# Patient Record
Sex: FEMALE | Race: WHITE | NOT HISPANIC OR LATINO | Employment: OTHER | URBAN - METROPOLITAN AREA
[De-identification: names, ages, dates, MRNs, and addresses within clinical notes are randomized per-mention and may not be internally consistent; named-entity substitution may affect disease eponyms.]

---

## 2017-01-11 ENCOUNTER — OFFICE VISIT (OUTPATIENT)
Dept: INTERNAL MEDICINE | Facility: CLINIC | Age: 82
End: 2017-01-11
Payer: MEDICARE

## 2017-01-11 VITALS
HEART RATE: 76 BPM | HEIGHT: 63 IN | SYSTOLIC BLOOD PRESSURE: 118 MMHG | DIASTOLIC BLOOD PRESSURE: 76 MMHG | BODY MASS INDEX: 30.16 KG/M2 | WEIGHT: 170.19 LBS

## 2017-01-11 DIAGNOSIS — F41.8 DEPRESSION WITH ANXIETY: Primary | Chronic | ICD-10-CM

## 2017-01-11 DIAGNOSIS — Z86.711 HISTORY OF PULMONARY EMBOLISM: Chronic | ICD-10-CM

## 2017-01-11 DIAGNOSIS — E78.00 HYPERCHOLESTEROLEMIA: ICD-10-CM

## 2017-01-11 DIAGNOSIS — I10 ESSENTIAL HYPERTENSION: Chronic | ICD-10-CM

## 2017-01-11 PROCEDURE — 1126F AMNT PAIN NOTED NONE PRSNT: CPT | Mod: S$GLB,,, | Performed by: INTERNAL MEDICINE

## 2017-01-11 PROCEDURE — 1159F MED LIST DOCD IN RCRD: CPT | Mod: S$GLB,,, | Performed by: INTERNAL MEDICINE

## 2017-01-11 PROCEDURE — 1157F ADVNC CARE PLAN IN RCRD: CPT | Mod: S$GLB,,, | Performed by: INTERNAL MEDICINE

## 2017-01-11 PROCEDURE — 3078F DIAST BP <80 MM HG: CPT | Mod: S$GLB,,, | Performed by: INTERNAL MEDICINE

## 2017-01-11 PROCEDURE — 99499 UNLISTED E&M SERVICE: CPT | Mod: S$GLB,,, | Performed by: INTERNAL MEDICINE

## 2017-01-11 PROCEDURE — 3074F SYST BP LT 130 MM HG: CPT | Mod: S$GLB,,, | Performed by: INTERNAL MEDICINE

## 2017-01-11 PROCEDURE — 99214 OFFICE O/P EST MOD 30 MIN: CPT | Mod: S$GLB,,, | Performed by: INTERNAL MEDICINE

## 2017-01-11 PROCEDURE — 1160F RVW MEDS BY RX/DR IN RCRD: CPT | Mod: S$GLB,,, | Performed by: INTERNAL MEDICINE

## 2017-01-11 RX ORDER — SERTRALINE HYDROCHLORIDE 50 MG/1
50 TABLET, FILM COATED ORAL DAILY
Qty: 30 TABLET | Refills: 11 | Status: SHIPPED | OUTPATIENT
Start: 2017-01-11 | End: 2017-03-02 | Stop reason: ALTCHOICE

## 2017-01-11 NOTE — PROGRESS NOTES
"Subjective:      Patient ID: Alessandra Hidalgo is a 81 y.o. female.    Chief Complaint: Follow-up    HPI:   "Can you give me something for energy?"   I have not had energy since I was in the hospital 2 years ago.  " I don't want to leave the house or do anything.  "I don't want to see anyone".  " I don't think the Lexapro is working".  She does all the driving,cleaning,cooking,marketing, etc...    Her  is in his mid eighties.  On chronic anticoagulation.  12/12/16 1312  High Final result      12/12/16 1312 BUN 16 - Final result     12/12/16 1312 CREATININE 1.13 - Final result     12/12/16 1312 CALCIUM 9.3 - Final result     12/12/16 1312  - Final result     12/12/16 1312 K 4.5 - Final result     12/12/16 1312 CL      PT 18.7    INR 2.8      Review of Systems   Constitutional: Negative.    HENT: Negative.    Eyes: Negative.    Respiratory: Positive for cough and shortness of breath. Negative for choking and chest tightness.    Cardiovascular: Negative for chest pain and leg swelling.   Gastrointestinal: Negative.    Endocrine: Negative.    Genitourinary: Negative.    Musculoskeletal: Negative.    Skin: Negative.    Neurological: Negative.    Hematological: Negative.    Psychiatric/Behavioral: Positive for dysphoric mood.       Objective:     Visit Vitals    /76 (BP Location: Right arm, Patient Position: Sitting, BP Method: Manual)    Pulse 76    Ht 5' 3" (1.6 m)    Wt 77.2 kg (170 lb 3.1 oz)    BMI 30.15 kg/m2       Physical Exam   Constitutional: She is oriented to person, place, and time. She appears well-developed and well-nourished.   HENT:   Head: Normocephalic and atraumatic.   Right Ear: External ear normal.   Left Ear: External ear normal.   Nose: Nose normal.   Mouth/Throat: Oropharynx is clear and moist.   Eyes: Conjunctivae are normal. Pupils are equal, round, and reactive to light.   Neck: Normal range of motion.   Cardiovascular: Normal rate, regular rhythm and normal heart " sounds.    Pulmonary/Chest: Effort normal and breath sounds normal.   Abdominal: Soft. Bowel sounds are normal.   Musculoskeletal: Normal range of motion.   Neurological: She is alert and oriented to person, place, and time.   Skin: Skin is warm and dry.   Psychiatric: She has a normal mood and affect. Her behavior is normal.   Vitals reviewed.      Assessment:     1. Depression with anxiety    2. Essential hypertension    3. History of pulmonary embolism    4. Hypercholesterolemia    Discussed possibility of metoprolol, but she was on this before the depression happened.  Plan:     Depression with anxiety  -     sertraline (ZOLOFT) 50 MG tablet; Take 1 tablet (50 mg total) by mouth once daily.  Dispense: 30 tablet; Refill: 11    Essential hypertension    History of pulmonary embolism  -     Protime-INR; Standing    Hypercholesterolemia

## 2017-01-11 NOTE — MR AVS SNAPSHOT
[facility]  [address],  [address]  [place] [address]  Phone: [phone]  Fax: [phone]                  Alessandra Hidalgo   2017 1:40 PM   Office Visit    Description:  Female : 1935   Provider:  Turner Adams MD   Department:  [facility]           Reason for Visit     Follow-up           Diagnoses this Visit        Comments    Essential hypertension    -  Primary     Depression with anxiety         History of pulmonary embolism         Hypercholesterolemia                To Do List           Goals (5 Years of Data)     None       These Medications        Disp Refills Start End    sertraline (ZOLOFT) 50 MG tablet 30 tablet 11 2017    Take 1 tablet (50 mg total) by mouth once daily. - Oral    Pharmacy: [facility] [place], [place] - [address] Ph #: [phone]         [facility] On Call     [facility] On Call Nurse Care Line -  Assistance  Registered nurses in the [facility] provide clinical advisement, health education, appointment booking, and other advisory services.  Call for this free service at [phone].             Medications           Message regarding Medications     Verify the changes and/or additions to your medication regime listed below are the same as discussed with your clinician today.  If any of these changes or additions are incorrect, please notify your healthcare provider.        START taking these NEW medications        Refills    sertraline (ZOLOFT) 50 MG tablet 11    Sig: Take 1 tablet (50 mg total) by mouth once daily.    Class: Normal    Route: Oral      STOP taking these medications     escitalopram oxalate (LEXAPRO) 10 MG tablet Take 1 tablet (10 mg total) by mouth once daily.           Verify that the below list of medications is an accurate representation of the medications you are currently taking.  If none reported, the list may be blank. If  "incorrect, please contact your healthcare provider. Carry this list with you in case of emergency.           Current Medications     alprazolam (XANAX) 0.5 MG tablet Take 1 tablet (0.5 mg total) by mouth 2 (two) times daily as needed.    metoprolol succinate (TOPROL-XL) 25 MG 24 hr tablet Take 1 tablet (25 mg total) by mouth once daily.    rosuvastatin (CRESTOR) 10 MG tablet TAKE 1 TABLET BY MOUTH ONCE DAILY FOR CHOLESTEROL.    warfarin (COUMADIN) 4 MG tablet TAKE ONE TABLET BY MOUTH DAILY.    fluticasone-salmeterol 250-50 mcg/dose (ADVAIR) 250-50 mcg/dose diskus inhaler Inhale 1 puff into the lungs 2 (two) times daily.    omeprazole (PRILOSEC) 20 MG capsule Take 1 capsule (20 mg total) by mouth once daily.    sertraline (ZOLOFT) 50 MG tablet Take 1 tablet (50 mg total) by mouth once daily.           Clinical Reference Information           Vital Signs - Last Recorded  Most recent update: 1/11/2017  1:43 PM by Clarissa Haney MA    BP Pulse Ht Wt BMI    118/76 (BP Location: Right arm, Patient Position: Sitting, BP Method: Manual) 76 5' 3" (1.6 m) 77.2 kg (170 lb 3.1 oz) 30.15 kg/m2      Blood Pressure          Most Recent Value    BP  118/76      Allergies as of 1/11/2017     Prozac [Fluoxetine]      Immunizations Administered on Date of Encounter - 1/11/2017     None      Orders Placed During Today's Visit     Recurring Lab Work Interval Expires    Protime-INR  Every 4 Weeks until 3/12/2018 3/12/2018      [facility] Sign-Up     Activating your [facility] account is as easy as 1-2-3!     1) Visit [email or web address], select Sign Up Now, enter this activation code and your date of birth, then select Next.  [email or web address]  Expires: 1/30/2017  3:02 PM      2) Create a username and password to use when you visit [facility] in the future and select a security question in case you lose your password and select Next.    3) Enter your e-mail address and click Sign Up!    Additional Information  If you have questions, please " e-mail [email or web address] or call [phone] to talk to our [facility] staff. Remember, [facility] is NOT to be used for urgent needs. For medical emergencies, dial 911.

## 2017-01-30 ENCOUNTER — TELEPHONE (OUTPATIENT)
Dept: INTERNAL MEDICINE | Facility: CLINIC | Age: 82
End: 2017-01-30

## 2017-02-01 DIAGNOSIS — Z86.711 HISTORY OF PULMONARY EMBOLISM: Chronic | ICD-10-CM

## 2017-02-01 NOTE — TELEPHONE ENCOUNTER
----- Message from Simin Saucedo sent at 2/1/2017  9:51 AM CST -----  Contact: patient  MEDICATION REFILLS    Name of medication:    Warfarin  Strength:                        4 mg  Direction:                        1 x day  30 or 90 day supply:       90      Pharmacy:                      [facility]  Patient phone #:  [phone]

## 2017-02-02 RX ORDER — WARFARIN 4 MG/1
TABLET ORAL
Qty: 90 TABLET | Refills: 3 | Status: SHIPPED | OUTPATIENT
Start: 2017-02-02 | End: 2018-01-29 | Stop reason: SDUPTHER

## 2017-02-07 ENCOUNTER — TELEPHONE (OUTPATIENT)
Dept: INTERNAL MEDICINE | Facility: CLINIC | Age: 82
End: 2017-02-07

## 2017-02-07 NOTE — TELEPHONE ENCOUNTER
Patient woke up this morning with nausea and diarrhea and stomach cramps. Patient is on coumadin and wants to know what she can take?   Pharm: [facility]    #[phone]

## 2017-02-07 NOTE — TELEPHONE ENCOUNTER
----- Message from Simin Saucedo sent at 2/7/2017  8:03 AM CST -----  Contact: patient  Patient had a appointment this morning but is canceling because she feels too sick to come in , having stomach cramps and diarrhea and would like a call back on what she can do. Call back [phone]

## 2017-03-01 ENCOUNTER — TELEPHONE (OUTPATIENT)
Dept: INTERNAL MEDICINE | Facility: CLINIC | Age: 82
End: 2017-03-01

## 2017-03-01 NOTE — TELEPHONE ENCOUNTER
Patient called the office to schedule an appointment with Dr. Adams. I explained to the patient that she doesn't have anything op today but I could put her with one of our other providers. Patient doesn't want to see anyone other than Dr. Adams and she will wait until tomorrow. I explained to patient if she starts to feel worse to go to the ER. Patient verbalized understanding.

## 2017-03-02 ENCOUNTER — OFFICE VISIT (OUTPATIENT)
Dept: INTERNAL MEDICINE | Facility: CLINIC | Age: 82
End: 2017-03-02
Payer: MEDICARE

## 2017-03-02 VITALS
RESPIRATION RATE: 16 BRPM | HEART RATE: 70 BPM | DIASTOLIC BLOOD PRESSURE: 70 MMHG | BODY MASS INDEX: 29.69 KG/M2 | OXYGEN SATURATION: 98 % | WEIGHT: 167.56 LBS | SYSTOLIC BLOOD PRESSURE: 128 MMHG | TEMPERATURE: 98 F | HEIGHT: 63 IN

## 2017-03-02 DIAGNOSIS — R05.3 COUGH, PERSISTENT: ICD-10-CM

## 2017-03-02 DIAGNOSIS — K21.9 GASTROESOPHAGEAL REFLUX DISEASE WITHOUT ESOPHAGITIS: Chronic | ICD-10-CM

## 2017-03-02 DIAGNOSIS — J30.89 NON-SEASONAL ALLERGIC RHINITIS DUE TO OTHER ALLERGIC TRIGGER: Primary | ICD-10-CM

## 2017-03-02 DIAGNOSIS — Z86.711 HISTORY OF PULMONARY EMBOLISM: Chronic | ICD-10-CM

## 2017-03-02 DIAGNOSIS — J43.9 PULMONARY EMPHYSEMA, UNSPECIFIED EMPHYSEMA TYPE: Chronic | ICD-10-CM

## 2017-03-02 PROCEDURE — 1157F ADVNC CARE PLAN IN RCRD: CPT | Mod: S$GLB,,, | Performed by: INTERNAL MEDICINE

## 2017-03-02 PROCEDURE — 3078F DIAST BP <80 MM HG: CPT | Mod: S$GLB,,, | Performed by: INTERNAL MEDICINE

## 2017-03-02 PROCEDURE — 99214 OFFICE O/P EST MOD 30 MIN: CPT | Mod: S$GLB,,, | Performed by: INTERNAL MEDICINE

## 2017-03-02 PROCEDURE — 1159F MED LIST DOCD IN RCRD: CPT | Mod: S$GLB,,, | Performed by: INTERNAL MEDICINE

## 2017-03-02 PROCEDURE — 1125F AMNT PAIN NOTED PAIN PRSNT: CPT | Mod: S$GLB,,, | Performed by: INTERNAL MEDICINE

## 2017-03-02 PROCEDURE — 3074F SYST BP LT 130 MM HG: CPT | Mod: S$GLB,,, | Performed by: INTERNAL MEDICINE

## 2017-03-02 PROCEDURE — 1160F RVW MEDS BY RX/DR IN RCRD: CPT | Mod: S$GLB,,, | Performed by: INTERNAL MEDICINE

## 2017-03-02 RX ORDER — CETIRIZINE HYDROCHLORIDE 10 MG/1
10 TABLET ORAL DAILY
Refills: 0 | COMMUNITY
Start: 2017-03-02 | End: 2017-10-16

## 2017-03-02 RX ORDER — ESOMEPRAZOLE MAGNESIUM 40 MG/1
40 CAPSULE, DELAYED RELEASE ORAL
Qty: 30 CAPSULE | Refills: 11 | Status: SHIPPED | OUTPATIENT
Start: 2017-03-02 | End: 2018-05-23

## 2017-03-02 NOTE — PROGRESS NOTES
"Subjective:      Patient ID: Alessandra Hidalgo is a 81 y.o. female.    Chief Complaint: Dizziness (pt c/o lightheadiness); Cough (yellow mucus for); Sore Throat; Nasal Congestion; and Fatigue    HPI: 81y/oWF has had nasal congestion,sore throat, cough productive of yellow  Mucous, fatigue and lightheadedness for "awhile". No fever,chills,GRAYSON,PND,orthopnea,CP,palpitations.  Does not think her Prilosec is doing any good. However, eats when and what she wants.    Review of Systems   All other systems reviewed and are negative.      Objective:   /70 (BP Location: Right arm, Patient Position: Sitting, BP Method: Manual)  Pulse 70  Temp 98.2 °F (36.8 °C) (Oral)   Resp 16  Ht 5' 3" (1.6 m)  Wt 76 kg (167 lb 8.8 oz)  SpO2 98%  BMI 29.68 kg/m2    Physical Exam   Constitutional: She is oriented to person, place, and time. She appears well-developed and well-nourished.   HENT:   Head: Normocephalic and atraumatic.   Mouth/Throat: Uvula is midline and mucous membranes are normal. Posterior oropharyngeal edema and posterior oropharyngeal erythema present. No oropharyngeal exudate.   Eyes: EOM are normal. Pupils are equal, round, and reactive to light.   Neck: Normal range of motion. Neck supple.   Cardiovascular: Normal rate, regular rhythm and normal heart sounds.    Pulmonary/Chest: Effort normal and breath sounds normal.   Abdominal: Soft. Bowel sounds are normal. There is no hepatosplenomegaly. There is no tenderness.   Musculoskeletal: Normal range of motion.   Neurological: She is alert and oriented to person, place, and time.   Skin: Skin is warm and dry.   Psychiatric: She has a normal mood and affect. Her behavior is normal.   Nursing note and vitals reviewed.      Assessment:     1. Non-seasonal allergic rhinitis due to other allergic trigger    2. Gastroesophageal reflux disease without esophagitis , these 2 dx seem to worsen her heartburn,  Causing worsening of her cough.   3. History of pulmonary embolism "    4. Cough, persistent    5. Pulmonary emphysema, unspecified emphysema type      Plan:     Non-seasonal allergic rhinitis due to other allergic trigger  -     cetirizine (ZYRTEC) 10 MG tablet; Take 1 tablet (10 mg total) by mouth once daily.; Refill: 0    Gastroesophageal reflux disease without esophagitis  -     esomeprazole (NEXIUM) 40 MG capsule; Take 1 capsule (40 mg total) by mouth before breakfast.  Dispense: 30 capsule; Refill: 11    History of pulmonary embolism    Cough, persistent    Pulmonary emphysema, unspecified emphysema type

## 2017-03-02 NOTE — MR AVS SNAPSHOT
[facility]  [address],  [address]  [place] [address]  Phone: [phone]  Fax: [phone]                  Alessandra Hidalgo   3/2/2017 1:40 PM   Office Visit    Description:  Female : 1935   Provider:  Turner Adams MD   Department:  [facility]           Reason for Visit     Dizziness     Cough     Sore Throat     Nasal Congestion     Fatigue           Diagnoses this Visit        Comments    Non-seasonal allergic rhinitis due to other allergic trigger    -  Primary     Gastroesophageal reflux disease without esophagitis         History of pulmonary embolism         Cough, persistent         Pulmonary emphysema, unspecified emphysema type                To Do List           Goals (5 Years of Data)     None       These Medications        Disp Refills Start End    esomeprazole (NEXIUM) 40 MG capsule 30 capsule 11 3/2/2017 2017    Take 1 capsule (40 mg total) by mouth before breakfast. - Oral    Pharmacy: [facility] [address] Ph #: [phone]         PURCHASE these Medications (No prescription required)        Start End    cetirizine (ZYRTEC) 10 MG tablet 3/2/2017 3/2/2018    Sig - Route: Take 1 tablet (10 mg total) by mouth once daily. - Oral    Class: OTC      [facility]     [facility] Care Line -  Assistance  Registered nurses in the [facility] provide clinical advisement, health education, appointment booking, and other advisory services.  Call for this free service at [phone].             Medications           Message regarding Medications     Verify the changes and/or additions to your medication regime listed below are the same as discussed with your clinician today.  If any of these changes or additions are incorrect, please notify your healthcare provider.        START taking these NEW medications        Refills    esomeprazole (NEXIUM) 40 MG capsule  11    Sig: Take 1 capsule (40 mg total) by mouth before breakfast.    Class: Normal    Route: Oral    cetirizine (ZYRTEC) 10 MG tablet 0    Sig: Take 1 tablet (10 mg total) by mouth once daily.    Class: OTC    Route: Oral      STOP taking these medications     fluticasone-salmeterol 250-50 mcg/dose (ADVAIR) 250-50 mcg/dose diskus inhaler Inhale 1 puff into the lungs 2 (two) times daily.    omeprazole (PRILOSEC) 20 MG capsule Take 1 capsule (20 mg total) by mouth once daily.    sertraline (ZOLOFT) 50 MG tablet Take 1 tablet (50 mg total) by mouth once daily.           Verify that the below list of medications is an accurate representation of the medications you are currently taking.  If none reported, the list may be blank. If incorrect, please contact your healthcare provider. Carry this list with you in case of emergency.           Current Medications     alprazolam (XANAX) 0.5 MG tablet Take 1 tablet (0.5 mg total) by mouth 2 (two) times daily as needed.    metoprolol succinate (TOPROL-XL) 25 MG 24 hr tablet Take 1 tablet (25 mg total) by mouth once daily.    rosuvastatin (CRESTOR) 10 MG tablet TAKE 1 TABLET BY MOUTH ONCE DAILY FOR CHOLESTEROL.    warfarin (COUMADIN) 4 MG tablet TAKE ONE TABLET BY MOUTH DAILY.    cetirizine (ZYRTEC) 10 MG tablet Take 1 tablet (10 mg total) by mouth once daily.    esomeprazole (NEXIUM) 40 MG capsule Take 1 capsule (40 mg total) by mouth before breakfast.           Clinical Reference Information           Your Vitals Were     BP                   128/70 (BP Location: Right arm, Patient Position: Sitting, BP Method: Manual)           Blood Pressure          Most Recent Value    BP  128/70      Allergies as of 3/2/2017     Prozac [Fluoxetine]      Immunizations Administered on Date of Encounter - 3/2/2017     None      [facility] Sign-Up     Activating your [facility] account is as easy as 1-2-3!     1) Visit [email or web address], select Sign Up Now, enter this activation code and your date  of birth, then select Next.  [email or web address]  Expires: 4/16/2017  2:09 PM      2) Create a username and password to use when you visit [facility] in the future and select a security question in case you lose your password and select Next.    3) Enter your e-mail address and click Sign Up!    Additional Information  If you have questions, please e-mail [email or web address] or call [phone] to talk to our [facility] staff. Remember, [facility] is NOT to be used for urgent needs. For medical emergencies, dial 911.         Language Assistance Services     ATTENTION: Language assistance services are available, free of charge. Please call [phone].      ATENCIÓN: Si habla español, tiene a raymundo disposición servicios gratuitos de asistencia lingüística. Llame al [phone].     CHÚ Ý: N?u b?n nói Ti?ng Vi?t, có các d?ch v? h? tr? ngôn ng? mi?n phí dành cho b?n. G?i s? [phone].         [facility] complies with applicable Federal civil rights laws and does not discriminate on the basis of race, color, national origin, age, disability, or sex.

## 2017-03-07 ENCOUNTER — OFFICE VISIT (OUTPATIENT)
Dept: INTERNAL MEDICINE | Facility: CLINIC | Age: 82
End: 2017-03-07
Payer: MEDICARE

## 2017-03-07 VITALS
HEIGHT: 63 IN | RESPIRATION RATE: 16 BRPM | SYSTOLIC BLOOD PRESSURE: 122 MMHG | BODY MASS INDEX: 29.72 KG/M2 | TEMPERATURE: 99 F | OXYGEN SATURATION: 97 % | HEART RATE: 78 BPM | WEIGHT: 167.75 LBS | DIASTOLIC BLOOD PRESSURE: 72 MMHG

## 2017-03-07 DIAGNOSIS — R05.9 COUGH: ICD-10-CM

## 2017-03-07 DIAGNOSIS — N39.0 URINARY TRACT INFECTION WITHOUT HEMATURIA, SITE UNSPECIFIED: Primary | ICD-10-CM

## 2017-03-07 PROCEDURE — 3074F SYST BP LT 130 MM HG: CPT | Mod: S$GLB,,, | Performed by: INTERNAL MEDICINE

## 2017-03-07 PROCEDURE — 99213 OFFICE O/P EST LOW 20 MIN: CPT | Mod: S$GLB,,, | Performed by: INTERNAL MEDICINE

## 2017-03-07 PROCEDURE — 1159F MED LIST DOCD IN RCRD: CPT | Mod: S$GLB,,, | Performed by: INTERNAL MEDICINE

## 2017-03-07 PROCEDURE — 1157F ADVNC CARE PLAN IN RCRD: CPT | Mod: S$GLB,,, | Performed by: INTERNAL MEDICINE

## 2017-03-07 PROCEDURE — 1125F AMNT PAIN NOTED PAIN PRSNT: CPT | Mod: S$GLB,,, | Performed by: INTERNAL MEDICINE

## 2017-03-07 PROCEDURE — 3078F DIAST BP <80 MM HG: CPT | Mod: S$GLB,,, | Performed by: INTERNAL MEDICINE

## 2017-03-07 PROCEDURE — 1160F RVW MEDS BY RX/DR IN RCRD: CPT | Mod: S$GLB,,, | Performed by: INTERNAL MEDICINE

## 2017-03-07 RX ORDER — SULFAMETHOXAZOLE AND TRIMETHOPRIM 800; 160 MG/1; MG/1
1 TABLET ORAL 2 TIMES DAILY
Qty: 6 TABLET | Refills: 0 | Status: SHIPPED | OUTPATIENT
Start: 2017-03-07 | End: 2017-03-10

## 2017-03-08 ENCOUNTER — TELEPHONE (OUTPATIENT)
Dept: INTERNAL MEDICINE | Facility: CLINIC | Age: 82
End: 2017-03-08

## 2017-03-08 DIAGNOSIS — Z86.711 HISTORY OF PULMONARY EMBOLISM: Primary | Chronic | ICD-10-CM

## 2017-03-10 ENCOUNTER — TELEPHONE (OUTPATIENT)
Dept: INTERNAL MEDICINE | Facility: CLINIC | Age: 82
End: 2017-03-10

## 2017-03-10 NOTE — TELEPHONE ENCOUNTER
----- Message from Simin Saucedo sent at 3/10/2017  1:33 PM CST -----  Contact: patient   Patient returning  call from today  and would also like the results of her blood work . Call back [phone]

## 2017-03-14 ENCOUNTER — TELEPHONE (OUTPATIENT)
Dept: INTERNAL MEDICINE | Facility: CLINIC | Age: 82
End: 2017-03-14

## 2017-03-16 ENCOUNTER — TELEPHONE (OUTPATIENT)
Dept: INTERNAL MEDICINE | Facility: CLINIC | Age: 82
End: 2017-03-16

## 2017-04-03 DIAGNOSIS — F41.9 ANXIETY: ICD-10-CM

## 2017-04-03 RX ORDER — ALPRAZOLAM 0.5 MG/1
0.5 TABLET ORAL 2 TIMES DAILY PRN
Qty: 60 TABLET | Refills: 1 | Status: SHIPPED | OUTPATIENT
Start: 2017-04-03 | End: 2017-08-01 | Stop reason: SDUPTHER

## 2017-04-05 ENCOUNTER — TELEPHONE (OUTPATIENT)
Dept: INTERNAL MEDICINE | Facility: CLINIC | Age: 82
End: 2017-04-05

## 2017-04-05 NOTE — TELEPHONE ENCOUNTER
----- Message from Simin Saucedo sent at 4/5/2017  3:25 PM CDT -----  Contact: Patient  Patient would like a new prescription of xanax filled at [facility].

## 2017-05-08 ENCOUNTER — TELEPHONE (OUTPATIENT)
Dept: INTERNAL MEDICINE | Facility: CLINIC | Age: 82
End: 2017-05-08

## 2017-05-08 NOTE — TELEPHONE ENCOUNTER
----- Message from Simin Saucedo sent at 5/8/2017  1:08 PM CDT -----  Contact: patient   Patient said she has requested lab results and not one has called her back. Please call [phone]

## 2017-06-06 ENCOUNTER — TELEPHONE (OUTPATIENT)
Dept: INTERNAL MEDICINE | Facility: CLINIC | Age: 82
End: 2017-06-06

## 2017-06-06 NOTE — TELEPHONE ENCOUNTER
Patient had her PT/INR done on 5/26/17 and she would like the results.  PT 27    INR 2.4   Ok.  TSA

## 2017-06-06 NOTE — TELEPHONE ENCOUNTER
----- Message from Simin Saucedo sent at 6/6/2017  9:32 AM CDT -----  Contact: patient  Patient would like her lab results on her PT INR she did last week . Call [phone]

## 2017-06-28 ENCOUNTER — OFFICE VISIT (OUTPATIENT)
Dept: INTERNAL MEDICINE | Facility: CLINIC | Age: 82
End: 2017-06-28
Payer: MEDICARE

## 2017-06-28 VITALS
OXYGEN SATURATION: 97 % | RESPIRATION RATE: 16 BRPM | DIASTOLIC BLOOD PRESSURE: 68 MMHG | HEART RATE: 84 BPM | SYSTOLIC BLOOD PRESSURE: 120 MMHG | HEIGHT: 63 IN | WEIGHT: 169 LBS | TEMPERATURE: 99 F | BODY MASS INDEX: 29.95 KG/M2

## 2017-06-28 DIAGNOSIS — J43.9 PULMONARY EMPHYSEMA, UNSPECIFIED EMPHYSEMA TYPE: Chronic | ICD-10-CM

## 2017-06-28 DIAGNOSIS — Z86.711 HISTORY OF PULMONARY EMBOLISM: Chronic | ICD-10-CM

## 2017-06-28 DIAGNOSIS — R42 DIZZINESS: Primary | ICD-10-CM

## 2017-06-28 DIAGNOSIS — J30.89 NON-SEASONAL ALLERGIC RHINITIS DUE TO OTHER ALLERGIC TRIGGER: ICD-10-CM

## 2017-06-28 PROCEDURE — 99499 UNLISTED E&M SERVICE: CPT | Mod: S$GLB,,, | Performed by: INTERNAL MEDICINE

## 2017-06-28 PROCEDURE — 99214 OFFICE O/P EST MOD 30 MIN: CPT | Mod: S$GLB,,, | Performed by: INTERNAL MEDICINE

## 2017-06-28 PROCEDURE — 1159F MED LIST DOCD IN RCRD: CPT | Mod: S$GLB,,, | Performed by: INTERNAL MEDICINE

## 2017-06-28 PROCEDURE — 1126F AMNT PAIN NOTED NONE PRSNT: CPT | Mod: S$GLB,,, | Performed by: INTERNAL MEDICINE

## 2017-06-28 RX ORDER — TRIAMCINOLONE ACETONIDE 55 UG/1
1 SPRAY, METERED NASAL DAILY
Qty: 16.9 G | Refills: 3 | Status: SHIPPED | OUTPATIENT
Start: 2017-06-28 | End: 2017-10-16

## 2017-06-28 RX ORDER — SERTRALINE HYDROCHLORIDE 50 MG/1
50 TABLET, FILM COATED ORAL DAILY
Refills: 11 | COMMUNITY
Start: 2017-05-01 | End: 2018-03-23 | Stop reason: SDUPTHER

## 2017-06-28 NOTE — PROGRESS NOTES
"Subjective:      Patient ID: Alessandra Hidalgo is a 81 y.o. female.    Chief Complaint: Results (lab results); Medication Refill; Sinusitis (runny nose); and Dizziness    HPI: 81y/oWF, has had a cough, runny nose, dizziness for months.  No known environmental causes.  No wheezing, bleeding, change in SOB,GRAYSON,CP,palpitations.  On therapy for allergies and GERD.  No change in medications.  PT ok for anticoagulation.      Review of Systems   HENT: Positive for hearing loss, postnasal drip and rhinorrhea. Negative for congestion, ear discharge, ear pain, facial swelling, nosebleeds, sinus pressure, sneezing, sore throat, tinnitus and trouble swallowing.    Neurological: Positive for dizziness. Negative for tremors, weakness, light-headedness and headaches.   All other systems reviewed and are negative.      Objective:   /68 (BP Location: Left arm, Patient Position: Sitting, BP Method: Manual)   Pulse 84   Temp 98.6 °F (37 °C) (Oral)   Resp 16   Ht 5' 3" (1.6 m)   Wt 76.6 kg (168 lb 15.7 oz)   SpO2 97%   BMI 29.93 kg/m²     Physical Exam   Constitutional: She is oriented to person, place, and time. She appears well-developed and well-nourished.   HENT:   Head: Normocephalic and atraumatic.   Right Ear: External ear normal.   Left Ear: External ear normal.   Nose: Nose normal.   Mouth/Throat: Oropharynx is clear and moist.   Eyes: EOM are normal. Pupils are equal, round, and reactive to light.   Neck: Normal range of motion. Neck supple. No JVD present.   Cardiovascular: Normal rate, regular rhythm and normal heart sounds.    Pulmonary/Chest: Effort normal and breath sounds normal. No stridor. She has no wheezes. She has no rales.   Abdominal: Soft. Bowel sounds are normal.   Musculoskeletal: Normal range of motion. She exhibits no edema or tenderness.   Neurological: She is alert and oriented to person, place, and time.   Skin: Skin is warm and dry.   Psychiatric: She has a normal mood and affect. Her behavior " is normal.   Nursing note and vitals reviewed.      Assessment:     1. Dizziness    2. Pulmonary emphysema, unspecified emphysema type    3. History of pulmonary embolism    4. Non-seasonal allergic rhinitis due to other allergic trigger      Plan:     Dizziness  -     Ambulatory referral to ENT    Pulmonary emphysema, unspecified emphysema type    History of pulmonary embolism    Non-seasonal allergic rhinitis due to other allergic trigger  -     Ambulatory referral to ENT  -     triamcinolone (NASACORT) 55 mcg nasal inhaler; 1 spray by Nasal route once daily at 6am.  Dispense: 16.9 g; Refill: 3

## 2017-08-01 DIAGNOSIS — F41.9 ANXIETY: ICD-10-CM

## 2017-08-01 NOTE — TELEPHONE ENCOUNTER
----- Message from Simin Saucedo sent at 8/1/2017  1:18 PM CDT -----  Contact: patient   Patient needs a new prescription for her xanax through [facility] and also the results on her labs. Call [phone]

## 2017-08-07 ENCOUNTER — TELEPHONE (OUTPATIENT)
Dept: INTERNAL MEDICINE | Facility: CLINIC | Age: 82
End: 2017-08-07

## 2017-08-07 NOTE — TELEPHONE ENCOUNTER
Pt requesting medication refill XANAX 0.5 mg tablet. Pharmacy [facility]. Vf/ma

## 2017-08-08 DIAGNOSIS — F41.9 ANXIETY: ICD-10-CM

## 2017-08-08 RX ORDER — ALPRAZOLAM 0.5 MG/1
TABLET ORAL
Qty: 60 TABLET | Refills: 1 | OUTPATIENT
Start: 2017-08-08

## 2017-08-08 RX ORDER — ALPRAZOLAM 0.5 MG/1
0.5 TABLET ORAL 2 TIMES DAILY PRN
Qty: 60 TABLET | Refills: 1 | Status: SHIPPED | OUTPATIENT
Start: 2017-08-08 | End: 2017-11-27 | Stop reason: SDUPTHER

## 2017-08-08 NOTE — TELEPHONE ENCOUNTER
----- Message from Simin Saucedo sent at 8/8/2017  9:12 AM CDT -----  Contact: Patient  Patient called last week for for a new prescription of xanax through [facility]. She has checked with them and no orders are in.

## 2017-08-17 DIAGNOSIS — F41.9 ANXIETY: ICD-10-CM

## 2017-08-17 RX ORDER — ALPRAZOLAM 0.5 MG/1
0.5 TABLET ORAL 2 TIMES DAILY PRN
Qty: 60 TABLET | Refills: 1 | OUTPATIENT
Start: 2017-08-17

## 2017-08-30 ENCOUNTER — TELEPHONE (OUTPATIENT)
Dept: ADMINISTRATIVE | Facility: HOSPITAL | Age: 82
End: 2017-08-30

## 2017-08-30 DIAGNOSIS — I10 ESSENTIAL HYPERTENSION: Chronic | ICD-10-CM

## 2017-08-30 DIAGNOSIS — Z79.01 ENCOUNTER FOR CURRENT LONG-TERM USE OF ANTICOAGULANTS: Primary | ICD-10-CM

## 2017-08-30 NOTE — TELEPHONE ENCOUNTER
Pt came by for results of her labs for her coumadin levels from last month, states she had her blood drawn again today.  Would like a call to get the results, also requests refill of Metoprolol to [facility], states usually filled by her cardiologist and not able to get in to see him. Please advise

## 2017-08-31 DIAGNOSIS — F41.9 ANXIETY: ICD-10-CM

## 2017-08-31 DIAGNOSIS — I10 ESSENTIAL HYPERTENSION: Chronic | ICD-10-CM

## 2017-08-31 RX ORDER — METOPROLOL SUCCINATE 25 MG/1
25 TABLET, EXTENDED RELEASE ORAL DAILY
Qty: 30 TABLET | Refills: 11 | Status: SHIPPED | OUTPATIENT
Start: 2017-08-31 | End: 2017-08-31 | Stop reason: SDUPTHER

## 2017-08-31 RX ORDER — METOPROLOL SUCCINATE 25 MG/1
25 TABLET, EXTENDED RELEASE ORAL DAILY
Qty: 30 TABLET | Refills: 0 | Status: SHIPPED | OUTPATIENT
Start: 2017-08-31 | End: 2018-08-31 | Stop reason: SDUPTHER

## 2017-08-31 NOTE — TELEPHONE ENCOUNTER
Spoke with patient and informed her to hold coumadin and repeat PT/INR on Saturday since Monday is a holiday. Patient verbalized understanding.

## 2017-08-31 NOTE — TELEPHONE ENCOUNTER
----- Message from [facility] CARMENZA Adams MD sent at 8/31/2017  3:52 PM CDT -----  Call pt and have her hold her coumadin tonight.  Needs PT done Monday am.  TSA

## 2017-09-01 ENCOUNTER — TELEPHONE (OUTPATIENT)
Dept: INTERNAL MEDICINE | Facility: CLINIC | Age: 82
End: 2017-09-01

## 2017-09-01 NOTE — TELEPHONE ENCOUNTER
----- Message from Opal Carpenter sent at 9/1/2017  8:53 AM CDT -----  Need to talk to nurse re:coumadin Rx.

## 2017-09-11 ENCOUNTER — TELEPHONE (OUTPATIENT)
Dept: INTERNAL MEDICINE | Facility: CLINIC | Age: 82
End: 2017-09-11

## 2017-09-11 NOTE — TELEPHONE ENCOUNTER
----- Message from Simin Saucedo sent at 9/11/2017 10:37 AM CDT -----  Contact: patient   Patient would like a call back with lab results from 09/02. Call [phone]

## 2017-09-12 NOTE — TELEPHONE ENCOUNTER
Spoke with patient and notified her to cont same dose of Coumadin and repeat in 1 month. Patient verbalized understanding

## 2017-09-26 ENCOUNTER — TELEPHONE (OUTPATIENT)
Dept: INTERNAL MEDICINE | Facility: CLINIC | Age: 82
End: 2017-09-26

## 2017-09-26 NOTE — TELEPHONE ENCOUNTER
----- Message from Simin Saucedo sent at 9/26/2017  1:45 PM CDT -----  Contact: PATIENT   Patient would like a call with lab results. Call [phone]

## 2017-09-29 ENCOUNTER — TELEPHONE (OUTPATIENT)
Dept: INTERNAL MEDICINE | Facility: CLINIC | Age: 82
End: 2017-09-29

## 2017-09-29 NOTE — TELEPHONE ENCOUNTER
Patient notified to cont same dose of Coumadin and repeat in 1 month. Patient verbalized understanding

## 2017-09-29 NOTE — TELEPHONE ENCOUNTER
----- Message from Jenna Callejas sent at 9/29/2017 10:56 AM CDT -----  Contact: Patient  Pt was requesting lab results. Pt can be reached @[phone]

## 2017-10-16 ENCOUNTER — OFFICE VISIT (OUTPATIENT)
Dept: INTERNAL MEDICINE | Facility: CLINIC | Age: 82
End: 2017-10-16
Payer: MEDICARE

## 2017-10-16 VITALS
BODY MASS INDEX: 30.45 KG/M2 | DIASTOLIC BLOOD PRESSURE: 72 MMHG | RESPIRATION RATE: 18 BRPM | SYSTOLIC BLOOD PRESSURE: 124 MMHG | WEIGHT: 171.88 LBS | HEIGHT: 63 IN | HEART RATE: 64 BPM

## 2017-10-16 DIAGNOSIS — J30.89 CHRONIC ALLERGIC RHINITIS DUE TO OTHER ALLERGIC TRIGGER, UNSPECIFIED SEASONALITY: Primary | ICD-10-CM

## 2017-10-16 DIAGNOSIS — K21.9 GASTROESOPHAGEAL REFLUX DISEASE WITHOUT ESOPHAGITIS: Chronic | ICD-10-CM

## 2017-10-16 DIAGNOSIS — Z86.711 HISTORY OF PULMONARY EMBOLISM: Chronic | ICD-10-CM

## 2017-10-16 DIAGNOSIS — J43.9 PULMONARY EMPHYSEMA, UNSPECIFIED EMPHYSEMA TYPE: Chronic | ICD-10-CM

## 2017-10-16 PROCEDURE — 99499 UNLISTED E&M SERVICE: CPT | Mod: S$GLB,,, | Performed by: INTERNAL MEDICINE

## 2017-10-16 PROCEDURE — 99214 OFFICE O/P EST MOD 30 MIN: CPT | Mod: S$GLB,,, | Performed by: INTERNAL MEDICINE

## 2017-10-16 RX ORDER — AZELASTINE 1 MG/ML
1 SPRAY, METERED NASAL 2 TIMES DAILY
Qty: 10 ML | Refills: 5 | Status: SHIPPED | OUTPATIENT
Start: 2017-10-16 | End: 2018-04-11

## 2017-10-16 NOTE — PROGRESS NOTES
"Subjective:      Patient ID: Alessandra Hidalgo is a 81 y.o. female.    Chief Complaint: Dizziness (follow up)    HPI: 81 y.o. White female  , went to see her Cardiologist, Dr. Archer at [facility]. He did an EKG,Echo, both of which are normal.  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 Now on coumadin.  Only complaints are post nasal drip, constant nose running, cough. No particular chest pain or heaviness, but fatigued all the  Time. Sleeps well at night.  She stopped the Zoloft, because she did not think it was effective.    Review of Systems   Constitutional: Positive for fatigue.   HENT: Positive for congestion, postnasal drip and sneezing.    Eyes: Negative.    Respiratory: Positive for cough.    Cardiovascular: Negative.    Gastrointestinal: Negative.    Genitourinary: Negative.    Musculoskeletal: Negative.    Skin: Negative.    Neurological: Positive for dizziness and light-headedness. Negative for weakness and headaches.   Hematological: Negative.    Psychiatric/Behavioral: Negative.        Objective:   /72 (BP Location: Left arm, Patient Position: Sitting, BP Method: Large (Manual))   Pulse 64   Resp 18   Ht 5' 3" (1.6 m)   Wt 78 kg (171 lb 13.6 oz)   BMI 30.44 kg/m²     Physical Exam   Constitutional: She is oriented to person, place, and time. She appears well-developed and well-nourished.   HENT:   Head: Normocephalic and atraumatic.   Right Ear: External ear and ear canal normal. Tympanic membrane is retracted.   Left Ear: External ear and ear canal normal. Tympanic membrane is retracted.   Nose: Nose normal.   Mouth/Throat: Uvula is midline, oropharynx is clear and moist and mucous membranes are normal.   Eyes: Conjunctivae are normal. Pupils are equal, round, and reactive to light.   Neck: Normal range of motion. Neck supple.   Cardiovascular: Normal rate, regular rhythm and normal heart sounds.    Pulmonary/Chest: Effort normal and breath sounds normal.   Abdominal: " Soft. Bowel sounds are normal.   Musculoskeletal: Normal range of motion.   Neurological: She is alert and oriented to person, place, and time.   Skin: Skin is warm and dry.   Psychiatric: She has a normal mood and affect. Her behavior is normal.   Nursing note and vitals reviewed.      Assessment:     1. Chronic allergic rhinitis due to other allergic trigger, unspecified seasonality    2. Pulmonary emphysema, unspecified emphysema type    3. History of pulmonary embolism    4. Gastroesophageal reflux disease without esophagitis      Plan:     Chronic allergic rhinitis due to other allergic trigger, unspecified seasonality  -     azelastine (ASTELIN) 137 mcg (0.1 %) nasal spray; 1 spray (137 mcg total) by Nasal route 2 (two) times daily.  Dispense: 10 mL; Refill: 5  -     chlorpheniramine (CHLOR-TRIMETON) 2 mg/5 mL syrup; 10ml po at bedtime.  Dispense: 473 mL; Refill: 5    Pulmonary emphysema, unspecified emphysema type    History of pulmonary embolism    Gastroesophageal reflux disease without esophagitis

## 2017-10-30 ENCOUNTER — TELEPHONE (OUTPATIENT)
Dept: INTERNAL MEDICINE | Facility: CLINIC | Age: 82
End: 2017-10-30

## 2017-10-30 NOTE — TELEPHONE ENCOUNTER
----- Message from Jenna Callejas sent at 10/30/2017  1:16 PM CDT -----  Pt is requesting lab results from last Monday. Pt can be reached at [phone]

## 2017-11-20 ENCOUNTER — TELEPHONE (OUTPATIENT)
Dept: INTERNAL MEDICINE | Facility: CLINIC | Age: 82
End: 2017-11-20

## 2017-11-27 DIAGNOSIS — F41.9 ANXIETY: ICD-10-CM

## 2017-11-27 RX ORDER — ALPRAZOLAM 0.5 MG/1
0.5 TABLET ORAL 2 TIMES DAILY PRN
Qty: 60 TABLET | Refills: 1 | Status: SHIPPED | OUTPATIENT
Start: 2017-11-27 | End: 2018-03-14 | Stop reason: SDUPTHER

## 2017-11-27 NOTE — TELEPHONE ENCOUNTER
----- Message from Simin Saucedo sent at 11/27/2017 12:56 PM CST -----  Contact: patient  Patient needs a prescription for xanax sent to [facility].

## 2017-12-21 ENCOUNTER — TELEPHONE (OUTPATIENT)
Dept: INTERNAL MEDICINE | Facility: CLINIC | Age: 82
End: 2017-12-21

## 2017-12-21 NOTE — TELEPHONE ENCOUNTER
Spoke with patient and informed her of PT/INR results and to stay on the same does of Coumadin. Patient verbalized understanding

## 2017-12-21 NOTE — TELEPHONE ENCOUNTER
----- Message from addison Adams MD sent at 12/21/2017  8:24 AM CST -----  plz call pt and tell her lab ok,same Rx.  TSA

## 2018-01-29 DIAGNOSIS — Z86.711 HISTORY OF PULMONARY EMBOLISM: Chronic | ICD-10-CM

## 2018-01-29 RX ORDER — WARFARIN 4 MG/1
TABLET ORAL
Qty: 90 TABLET | Refills: 3 | Status: SHIPPED | OUTPATIENT
Start: 2018-01-29 | End: 2018-06-27 | Stop reason: DRUGHIGH

## 2018-02-02 ENCOUNTER — TELEPHONE (OUTPATIENT)
Dept: INTERNAL MEDICINE | Facility: CLINIC | Age: 83
End: 2018-02-02

## 2018-02-02 NOTE — TELEPHONE ENCOUNTER
----- Message from Yana Boyer sent at 2/2/2018  3:16 PM CST -----  Contact: [phone]/self  Patient would like to get test results from 1/25/18. Please advise.

## 2018-02-27 DIAGNOSIS — F41.9 ANXIETY: ICD-10-CM

## 2018-02-27 RX ORDER — ALPRAZOLAM 0.5 MG/1
TABLET ORAL
Qty: 60 TABLET | Refills: 1 | OUTPATIENT
Start: 2018-02-27

## 2018-02-28 ENCOUNTER — TELEPHONE (OUTPATIENT)
Dept: INTERNAL MEDICINE | Facility: CLINIC | Age: 83
End: 2018-02-28

## 2018-02-28 NOTE — TELEPHONE ENCOUNTER
Spoke with patient and informed her of results. Patient is on coumadin 4mg daily. Patient verbalized understanding

## 2018-02-28 NOTE — TELEPHONE ENCOUNTER
----- [facility] from [facility] sent at 2/28/2018  1:22 PM CST -----  plz let her know her PT is good, same dose.  TSA

## 2018-03-06 ENCOUNTER — PES CALL (OUTPATIENT)
Dept: ADMINISTRATIVE | Facility: CLINIC | Age: 83
End: 2018-03-06

## 2018-03-12 ENCOUNTER — TELEPHONE (OUTPATIENT)
Dept: INTERNAL MEDICINE | Facility: CLINIC | Age: 83
End: 2018-03-12

## 2018-03-12 DIAGNOSIS — F41.9 ANXIETY: ICD-10-CM

## 2018-03-12 RX ORDER — ALPRAZOLAM 0.5 MG/1
0.5 TABLET ORAL 2 TIMES DAILY PRN
Qty: 60 TABLET | Refills: 1 | OUTPATIENT
Start: 2018-03-12

## 2018-03-12 NOTE — TELEPHONE ENCOUNTER
----- Message from Simin Saucedo sent at 3/12/2018  3:15 PM CDT -----  Contact: Son/Omid  Son came by to  patients prescription for ALPRAZolam (XANAX) 0.5 MG tablet. I told him it was not ready, please give a call when it is ready to be picked up. Call [phone]

## 2018-03-12 NOTE — TELEPHONE ENCOUNTER
Patient states that she had an appointment scheduled for today and she was not feeling well. She will call the office to schedule an appt when she is feeling better.

## 2018-03-12 NOTE — TELEPHONE ENCOUNTER
----- Message from Bismark Forman sent at 3/12/2018 11:21 AM CDT -----  Contact: self/[phone]  Patient is requesting a refill on her medications.  Please call and advise when sent to pharmacy.    ALPRAZolam (XANAX) 0.5 MG tablet    [facility] - [place], [place] - [address], [place]  Pt will pick Xanax up from office when ready.

## 2018-03-14 ENCOUNTER — OFFICE VISIT (OUTPATIENT)
Dept: INTERNAL MEDICINE | Facility: CLINIC | Age: 83
End: 2018-03-14
Payer: MEDICARE

## 2018-03-14 VITALS
WEIGHT: 171.81 LBS | DIASTOLIC BLOOD PRESSURE: 76 MMHG | HEIGHT: 63 IN | BODY MASS INDEX: 30.44 KG/M2 | SYSTOLIC BLOOD PRESSURE: 128 MMHG | HEART RATE: 73 BPM | OXYGEN SATURATION: 98 %

## 2018-03-14 DIAGNOSIS — J43.9 PULMONARY EMPHYSEMA, UNSPECIFIED EMPHYSEMA TYPE: Primary | Chronic | ICD-10-CM

## 2018-03-14 DIAGNOSIS — E78.00 HYPERCHOLESTEROLEMIA: ICD-10-CM

## 2018-03-14 DIAGNOSIS — F41.9 ANXIETY: ICD-10-CM

## 2018-03-14 DIAGNOSIS — F41.8 DEPRESSION WITH ANXIETY: Chronic | ICD-10-CM

## 2018-03-14 DIAGNOSIS — I10 ESSENTIAL HYPERTENSION: Chronic | ICD-10-CM

## 2018-03-14 DIAGNOSIS — H93.19 TINNITUS, UNSPECIFIED LATERALITY: ICD-10-CM

## 2018-03-14 PROCEDURE — 99499 UNLISTED E&M SERVICE: CPT | Mod: S$GLB,,, | Performed by: INTERNAL MEDICINE

## 2018-03-14 PROCEDURE — 3078F DIAST BP <80 MM HG: CPT | Mod: CPTII,S$GLB,, | Performed by: INTERNAL MEDICINE

## 2018-03-14 PROCEDURE — 3074F SYST BP LT 130 MM HG: CPT | Mod: CPTII,S$GLB,, | Performed by: INTERNAL MEDICINE

## 2018-03-14 PROCEDURE — 99999 PR PBB SHADOW E&M-EST. PATIENT-LVL IV: CPT | Mod: PBBFAC,,, | Performed by: INTERNAL MEDICINE

## 2018-03-14 PROCEDURE — 99214 OFFICE O/P EST MOD 30 MIN: CPT | Mod: S$GLB,,, | Performed by: INTERNAL MEDICINE

## 2018-03-14 RX ORDER — ALPRAZOLAM 0.5 MG/1
0.5 TABLET ORAL 2 TIMES DAILY PRN
Qty: 60 TABLET | Refills: 1 | Status: SHIPPED | OUTPATIENT
Start: 2018-03-14 | End: 2018-06-20 | Stop reason: SDUPTHER

## 2018-03-14 NOTE — PROGRESS NOTES
"Subjective:      Patient ID: Alessandra Hidalgo is a 82 y.o. female.    Chief Complaint: Dizziness; Sinus Problem; and Cough    HPI: 82 y.o. White female , has had a complicated history:  Sept 2015 had a TKR, PE,coded,intubated,resuscitated....  Long history of fatigue.  Lightheadedness on & off for years. Off & on during the day, without any specification of whether  Position, time of day,meds,foods,weather make a difference.  Tinnitus all day long. Nothing makes it worse, however, she can sleep at night well.  Daily morning headache, then goes away, once up. Takes nothing for this. Only thing new  Is a pillow, given to her for [place].      Review of Systems   Constitutional: Negative.    HENT: Positive for congestion, postnasal drip and tinnitus. Negative for ear discharge, ear pain, facial swelling, mouth sores, nosebleeds, rhinorrhea, sinus pain, sinus pressure, sneezing, sore throat, trouble swallowing and voice change.    Eyes: Negative.    Respiratory: Positive for cough. Negative for choking, chest tightness, shortness of breath and wheezing.    Cardiovascular: Negative for chest pain and palpitations.   Gastrointestinal: Negative.    Endocrine: Negative.    Genitourinary: Negative.    Musculoskeletal: Negative.    Neurological: Positive for light-headedness and headaches. Negative for dizziness, tremors, seizures, syncope, facial asymmetry, speech difficulty, weakness and numbness.   Hematological: Negative.    Psychiatric/Behavioral: Negative.        Objective:   /76   Pulse 73   Ht 5' 3" (1.6 m)   Wt 77.9 kg (171 lb 12.8 oz)   SpO2 98%   BMI 30.43 kg/m²   No orthostatic changes.  Physical Exam   Constitutional: She appears well-developed and well-nourished.   HENT:   Head: Normocephalic and atraumatic.   Right Ear: Tympanic membrane, external ear and ear canal normal.   Left Ear: Tympanic membrane, external ear and ear canal normal.   Nose: Nose normal.   Mouth/Throat: Uvula is midline, " oropharynx is clear and moist and mucous membranes are normal.   Eyes: Conjunctivae and EOM are normal. Pupils are equal, round, and reactive to light.   Neck: Neck supple. No JVD present. Carotid bruit is not present. No thyroid mass present.   Cardiovascular: Normal rate, regular rhythm and normal heart sounds.    Pulmonary/Chest: Effort normal and breath sounds normal. No stridor.   Abdominal: Soft. Bowel sounds are normal.   Musculoskeletal: Normal range of motion. She exhibits no edema.   Neurological: She is alert.   Skin: Skin is warm and dry.   Psychiatric: She has a normal mood and affect. Her behavior is normal.   Nursing note and vitals reviewed.      Assessment:     1. Pulmonary emphysema, unspecified emphysema type    2. Tinnitus, unspecified laterality    3. Essential hypertension    4. Hypercholesterolemia    5. Anxiety    6. Depression with anxiety    She has seen Dr. Barone in the past, who has ordered imaging of her head, but she did not get it done.  Plan:     Pulmonary emphysema, unspecified emphysema type  -     X-Ray Chest PA And Lateral; Future; Expected date: 03/14/2018    Tinnitus, unspecified laterality  She Dr. Barone to r/o vascular vs conductive tinnitus.Essential hypertension  -     CBC auto differential; Future; Expected date: 03/14/2018  -     Comprehensive metabolic panel; Future; Expected date: 03/14/2018  -     TSH; Future; Expected date: 03/14/2018  -     Urinalysis; Future; Expected date: 03/14/2018    Hypercholesterolemia  -     Lipid panel; Future; Expected date: 03/14/2018    Anxiety  -     TSH; Future; Expected date: 03/14/2018  -     ALPRAZolam (XANAX) 0.5 MG tablet; Take 1 tablet (0.5 mg total) by mouth 2 (two) times daily as needed.  Dispense: 60 tablet; Refill: 1    Depression with anxiety

## 2018-03-20 DIAGNOSIS — J32.4 CHRONIC PANSINUSITIS: Primary | ICD-10-CM

## 2018-03-20 DIAGNOSIS — R09.82 POST-NASAL DRIP: ICD-10-CM

## 2018-03-26 RX ORDER — SERTRALINE HYDROCHLORIDE 50 MG/1
TABLET, FILM COATED ORAL
Qty: 30 TABLET | Refills: 11 | Status: SHIPPED | OUTPATIENT
Start: 2018-03-26 | End: 2018-08-27 | Stop reason: SDUPTHER

## 2018-04-02 DIAGNOSIS — Z79.01 ANTICOAGULANT LONG-TERM USE: Primary | ICD-10-CM

## 2018-04-11 ENCOUNTER — OFFICE VISIT (OUTPATIENT)
Dept: INTERNAL MEDICINE | Facility: CLINIC | Age: 83
End: 2018-04-11
Payer: MEDICARE

## 2018-04-11 VITALS
HEART RATE: 68 BPM | SYSTOLIC BLOOD PRESSURE: 120 MMHG | WEIGHT: 173.31 LBS | OXYGEN SATURATION: 97 % | HEIGHT: 63 IN | TEMPERATURE: 98 F | RESPIRATION RATE: 16 BRPM | BODY MASS INDEX: 30.71 KG/M2 | DIASTOLIC BLOOD PRESSURE: 66 MMHG

## 2018-04-11 DIAGNOSIS — N18.30 CKD (CHRONIC KIDNEY DISEASE) STAGE 3, GFR 30-59 ML/MIN: ICD-10-CM

## 2018-04-11 DIAGNOSIS — J30.89 NON-SEASONAL ALLERGIC RHINITIS DUE TO OTHER ALLERGIC TRIGGER, UNSPECIFIED CHRONICITY: Primary | ICD-10-CM

## 2018-04-11 DIAGNOSIS — G89.4 CHRONIC PAIN SYNDROME: ICD-10-CM

## 2018-04-11 DIAGNOSIS — F41.8 DEPRESSION WITH ANXIETY: Chronic | ICD-10-CM

## 2018-04-11 PROCEDURE — 99213 OFFICE O/P EST LOW 20 MIN: CPT | Mod: S$GLB,,, | Performed by: INTERNAL MEDICINE

## 2018-04-11 PROCEDURE — 99499 UNLISTED E&M SERVICE: CPT | Mod: S$GLB,,, | Performed by: INTERNAL MEDICINE

## 2018-04-11 PROCEDURE — 99999 PR PBB SHADOW E&M-EST. PATIENT-LVL III: CPT | Mod: PBBFAC,,, | Performed by: INTERNAL MEDICINE

## 2018-04-11 PROCEDURE — 3074F SYST BP LT 130 MM HG: CPT | Mod: CPTII,S$GLB,, | Performed by: INTERNAL MEDICINE

## 2018-04-11 PROCEDURE — 3078F DIAST BP <80 MM HG: CPT | Mod: CPTII,S$GLB,, | Performed by: INTERNAL MEDICINE

## 2018-04-11 RX ORDER — DULOXETIN HYDROCHLORIDE 20 MG/1
20 CAPSULE, DELAYED RELEASE ORAL DAILY
Qty: 30 CAPSULE | Refills: 11 | Status: SHIPPED | OUTPATIENT
Start: 2018-04-11 | End: 2018-08-31 | Stop reason: SDUPTHER

## 2018-04-11 NOTE — PROGRESS NOTES
"Subjective:      Patient ID: Alessandra Hidalgo is a 82 y.o. female.    Chief Complaint: Follow-up (1 month follow up); Cough (runny nose x); Headache (x 3-4 month); Fatigue (x 3 month); and Sore Throat (x 1 month)    HPI: 82 y.o. White female , has seen Dr. Barone for chronic rhinitis. He did a CT of sinuses, which was unrevealing.  Issues:  -tired, fatigued all day long ( sleeps well, awakens refreshed).  -headache, runny nose, sore throat and cough for 1 month.  -when asked, pt is very depressed, does nothing all day.  -ch pain in lower back and both knees.       Review of Systems   Constitutional: Positive for fatigue. Negative for chills, diaphoresis and fever.   HENT: Positive for congestion, postnasal drip and rhinorrhea.    Eyes: Negative.    Respiratory: Positive for cough and shortness of breath. Negative for chest tightness.    Cardiovascular: Negative for chest pain and palpitations.   Gastrointestinal: Negative.    Endocrine: Negative.    Genitourinary: Positive for frequency and urgency.        Dribbles   Musculoskeletal: Positive for arthralgias, back pain and gait problem.   Neurological: Positive for dizziness and headaches. Negative for tremors, speech difficulty, weakness and numbness.   Hematological: Negative.    Psychiatric/Behavioral: Positive for dysphoric mood. Negative for sleep disturbance.       Objective:   /66 (BP Location: Left arm, Patient Position: Sitting, BP Method: Large (Manual))   Pulse 68   Temp 98.2 °F (36.8 °C) (Oral)   Resp 16   Ht 5' 3" (1.6 m)   Wt 78.6 kg (173 lb 4.5 oz)   SpO2 97%   BMI 30.70 kg/m²     Physical Exam   Constitutional: She is oriented to person, place, and time. She appears well-developed and well-nourished.   HENT:   Head: Normocephalic and atraumatic.   Right Ear: External ear normal.   Left Ear: External ear normal.   Nose: Nose normal.   Mouth/Throat: Oropharynx is clear and moist.   Eyes: Conjunctivae are normal.   Neck: Normal range of " motion.   Cardiovascular: Normal rate, regular rhythm and normal heart sounds.    Pulmonary/Chest: Effort normal and breath sounds normal. No stridor.   Lymphadenopathy:     She has no cervical adenopathy.   Neurological: She is alert and oriented to person, place, and time.   Skin: Skin is warm and dry.   Psychiatric: She has a normal mood and affect. Her behavior is normal.   Nursing note and vitals reviewed.      Assessment:     1. Non-seasonal allergic rhinitis due to other allergic trigger, unspecified chronicity    2. CKD (chronic kidney disease) stage 3, GFR 30-59 ml/min    3. Depression with anxiety    4. Chronic pain syndrome      Plan:     Non-seasonal allergic rhinitis due to other allergic trigger, unspecified chronicity  She has nasal sprays at home.  CKD (chronic kidney disease) stage 3, GFR 30-59 ml/min    Depression with anxiety  -     DULoxetine (CYMBALTA) 20 MG capsule; Take 1 capsule (20 mg total) by mouth once daily.  Dispense: 30 capsule; Refill: 11    Chronic pain syndrome  -     DULoxetine (CYMBALTA) 20 MG capsule; Take 1 capsule (20 mg total) by mouth once daily.  Dispense: 30 capsule; Refill: 11

## 2018-04-12 ENCOUNTER — TELEPHONE (OUTPATIENT)
Dept: INTERNAL MEDICINE | Facility: CLINIC | Age: 83
End: 2018-04-12

## 2018-04-12 NOTE — TELEPHONE ENCOUNTER
----- Message from Carolyn Hernandez sent at 4/12/2018  8:45 AM CDT -----  Contact: [phone]/self  Patient would like to know if  medication DULoxetine (CYMBALTA) 20 MG capsule and sertraline (ZOLOFT) 50 MG tablet have a drug interaction. Please advise.

## 2018-05-23 ENCOUNTER — OFFICE VISIT (OUTPATIENT)
Dept: INTERNAL MEDICINE | Facility: CLINIC | Age: 83
End: 2018-05-23
Payer: MEDICARE

## 2018-05-23 ENCOUNTER — TELEPHONE (OUTPATIENT)
Dept: INTERNAL MEDICINE | Facility: CLINIC | Age: 83
End: 2018-05-23

## 2018-05-23 VITALS
HEIGHT: 63 IN | WEIGHT: 172.5 LBS | HEART RATE: 76 BPM | DIASTOLIC BLOOD PRESSURE: 60 MMHG | BODY MASS INDEX: 30.56 KG/M2 | SYSTOLIC BLOOD PRESSURE: 112 MMHG | OXYGEN SATURATION: 96 %

## 2018-05-23 DIAGNOSIS — M19.90 ARTHRITIS: ICD-10-CM

## 2018-05-23 DIAGNOSIS — Z86.711 HISTORY OF PULMONARY EMBOLISM: Chronic | ICD-10-CM

## 2018-05-23 DIAGNOSIS — J43.9 PULMONARY EMPHYSEMA, UNSPECIFIED EMPHYSEMA TYPE: Primary | Chronic | ICD-10-CM

## 2018-05-23 DIAGNOSIS — R05.9 COUGH: ICD-10-CM

## 2018-05-23 DIAGNOSIS — I10 ESSENTIAL HYPERTENSION: Chronic | ICD-10-CM

## 2018-05-23 PROCEDURE — 3074F SYST BP LT 130 MM HG: CPT | Mod: CPTII,S$GLB,, | Performed by: INTERNAL MEDICINE

## 2018-05-23 PROCEDURE — 99999 PR PBB SHADOW E&M-EST. PATIENT-LVL III: CPT | Mod: PBBFAC,,, | Performed by: INTERNAL MEDICINE

## 2018-05-23 PROCEDURE — 99214 OFFICE O/P EST MOD 30 MIN: CPT | Mod: S$GLB,,, | Performed by: INTERNAL MEDICINE

## 2018-05-23 PROCEDURE — 3078F DIAST BP <80 MM HG: CPT | Mod: CPTII,S$GLB,, | Performed by: INTERNAL MEDICINE

## 2018-05-23 PROCEDURE — 99499 UNLISTED E&M SERVICE: CPT | Mod: S$GLB,,, | Performed by: INTERNAL MEDICINE

## 2018-05-23 RX ORDER — TRAMADOL HYDROCHLORIDE 50 MG/1
50 TABLET ORAL
Qty: 30 TABLET | Refills: 1 | Status: SHIPPED | OUTPATIENT
Start: 2018-05-23 | End: 2018-06-02

## 2018-05-23 RX ORDER — BUDESONIDE AND FORMOTEROL FUMARATE DIHYDRATE 160; 4.5 UG/1; UG/1
1 AEROSOL RESPIRATORY (INHALATION) EVERY 12 HOURS
Qty: 1 INHALER | Refills: 12 | Status: SHIPPED | OUTPATIENT
Start: 2018-05-23 | End: 2018-08-31 | Stop reason: SDUPTHER

## 2018-05-23 NOTE — PROGRESS NOTES
"Subjective:      Patient ID: Alessandra Hidalgo is a 82 y.o. female.    Chief Complaint: Follow-up and Fatigue    HPI: 82 y.o. White female, recently seen for fatigue.  Pt has a mild degree of SOB since here knee surgery.  She also, still has pain in both knees and low back, and because  Of her coumadin is restricted to what she can take.  Is somewhat depressed.  All labs reviewed.    No anemia.  PT therapeutic.      Review of Systems   Constitutional: Positive for fatigue. Negative for chills, diaphoresis and fever.   HENT: Negative.    Eyes: Negative.    Respiratory: Positive for shortness of breath. Negative for cough, choking, chest tightness and wheezing.    Cardiovascular: Negative for chest pain and palpitations.   Gastrointestinal: Negative.    Endocrine: Negative.    Genitourinary: Negative.    Musculoskeletal: Positive for back pain and gait problem.   Allergic/Immunologic: Negative.    Neurological: Positive for light-headedness. Negative for dizziness, weakness and headaches.        Minor lightheadedness when she awakens.   Hematological: Negative.    Psychiatric/Behavioral: Positive for dysphoric mood.       Objective:   /60   Pulse 76   Ht 5' 3" (1.6 m)   Wt 78.2 kg (172 lb 8 oz)   SpO2 96%   BMI 30.56 kg/m²     Physical Exam   Constitutional: She appears well-developed and well-nourished.   HENT:   Head: Normocephalic and atraumatic.   Nose: Nose normal.   Mouth/Throat: Oropharynx is clear and moist.   Eyes: Conjunctivae are normal.   Neck: Normal range of motion. Neck supple. No JVD present.   Cardiovascular: Normal rate, regular rhythm and normal heart sounds.    Pulmonary/Chest: Effort normal and breath sounds normal.   Abdominal: Soft. Bowel sounds are normal.   Musculoskeletal: Normal range of motion.   Neurological: She is alert.   Skin: Skin is warm and dry.   Psychiatric: She has a normal mood and affect. Her behavior is normal.   Nursing note and vitals reviewed.      Assessment: "     1. Pulmonary emphysema, unspecified emphysema type    2. History of pulmonary embolism    3. Essential hypertension    4. Cough    5. Arthritis    The chronic arthritic pain can make peolple fatigued.  Also, her emphysema and SOB could be a strain, so will begin Symbicort and Tramadol.  Plan:     Pulmonary emphysema, unspecified emphysema type  -     budesonide-formoterol 160-4.5 mcg (SYMBICORT) 160-4.5 mcg/actuation HFAA; Inhale 1 puff into the lungs every 12 (twelve) hours.  Dispense: 1 Inhaler; Refill: 12    History of pulmonary embolism    Essential hypertension    Cough    Arthritis  -     traMADol (ULTRAM) 50 mg tablet; Take 1 tablet (50 mg total) by mouth every 24 hours as needed for Pain.  Dispense: 30 tablet; Refill: 1

## 2018-05-23 NOTE — TELEPHONE ENCOUNTER
----- Message from [facility] CARMENZA Adams MD sent at 5/22/2018  4:48 PM CDT -----  PLz call pt and tell her the bleeding time is good. Stay on same dose of coumadin.  TSA

## 2018-06-02 NOTE — TELEPHONE ENCOUNTER
----- Message from [facility] CARMENZA Adams MD sent at 11/20/2017  3:54 PM CST -----  Plz call pt, and tell her, her labs look good, stay on same amount of coumadin.  TSA  
Patient notified of information below.   
No

## 2018-06-20 DIAGNOSIS — F41.9 ANXIETY: ICD-10-CM

## 2018-06-25 DIAGNOSIS — Z79.01 LONG TERM (CURRENT) USE OF ANTICOAGULANTS: Primary | ICD-10-CM

## 2018-06-27 ENCOUNTER — TELEPHONE (OUTPATIENT)
Dept: FAMILY MEDICINE | Facility: CLINIC | Age: 83
End: 2018-06-27

## 2018-06-27 ENCOUNTER — TELEPHONE (OUTPATIENT)
Dept: INTERNAL MEDICINE | Facility: CLINIC | Age: 83
End: 2018-06-27

## 2018-06-27 RX ORDER — TRAMADOL HYDROCHLORIDE 50 MG/1
50 TABLET ORAL 3 TIMES DAILY PRN
Refills: 1 | COMMUNITY
Start: 2018-06-20 | End: 2019-01-25

## 2018-06-27 RX ORDER — ALPRAZOLAM 0.5 MG/1
TABLET ORAL
Qty: 60 TABLET | Refills: 0 | Status: SHIPPED | OUTPATIENT
Start: 2018-06-27 | End: 2018-06-29 | Stop reason: SDUPTHER

## 2018-06-27 NOTE — TELEPHONE ENCOUNTER
----- Message from Milli Rey DO sent at 6/27/2018  3:59 PM CDT -----  Please call pt. INR was 4 on 6/25/18 which is higher than her goal of 2.5-3.5. Recommend the following  1. Coumadin 2 mg tonight (1/2 of her 4mg)  2. Then resume 4 mg night   3. Repeat INR in 1 week (please order)

## 2018-06-27 NOTE — TELEPHONE ENCOUNTER
Notified pt of INR results and coumadin adjustment, pt verbalized understanding and repeat lab scheduled.

## 2018-06-29 DIAGNOSIS — F41.9 ANXIETY: ICD-10-CM

## 2018-06-29 RX ORDER — ALPRAZOLAM 0.5 MG/1
TABLET ORAL
Qty: 60 TABLET | Refills: 0 | Status: SHIPPED | OUTPATIENT
Start: 2018-06-29 | End: 2018-08-21 | Stop reason: SDUPTHER

## 2018-08-21 DIAGNOSIS — F41.9 ANXIETY: ICD-10-CM

## 2018-08-21 NOTE — TELEPHONE ENCOUNTER
----- Message from Moirashankar Taylor sent at 8/21/2018  1:11 PM CDT -----  Contact: [phone] / self   Patient states she is having knee pain and cannot make it into the office but would like her Xanax refilled. States she would like it sent to [facility]. Please advise.

## 2018-08-22 RX ORDER — ALPRAZOLAM 0.5 MG/1
0.5 TABLET ORAL 2 TIMES DAILY PRN
Qty: 60 TABLET | Refills: 0 | Status: SHIPPED | OUTPATIENT
Start: 2018-08-22 | End: 2018-08-27 | Stop reason: SDUPTHER

## 2018-08-23 DIAGNOSIS — F41.9 ANXIETY: ICD-10-CM

## 2018-08-23 RX ORDER — ALPRAZOLAM 0.5 MG/1
TABLET ORAL
Qty: 60 TABLET | Refills: 0 | OUTPATIENT
Start: 2018-08-23

## 2018-08-27 RX ORDER — ALPRAZOLAM 0.5 MG/1
0.5 TABLET ORAL 2 TIMES DAILY PRN
Qty: 60 TABLET | Refills: 0 | Status: SHIPPED | OUTPATIENT
Start: 2018-08-27 | End: 2018-08-31 | Stop reason: SDUPTHER

## 2018-08-27 RX ORDER — SERTRALINE HYDROCHLORIDE 50 MG/1
50 TABLET, FILM COATED ORAL DAILY
Qty: 30 TABLET | Refills: 11 | Status: SHIPPED | OUTPATIENT
Start: 2018-08-27 | End: 2018-08-31 | Stop reason: SDUPTHER

## 2018-08-27 NOTE — TELEPHONE ENCOUNTER
----- Message from Ebony Frey sent at 8/27/2018 10:07 AM CDT -----  Contact: [phone]/ self   Pt requesting a refill on rx sertraline (ZOLOFT) 50 MG tablet [facility] to [facility] . Please advise

## 2018-08-31 ENCOUNTER — OFFICE VISIT (OUTPATIENT)
Dept: INTERNAL MEDICINE | Facility: CLINIC | Age: 83
End: 2018-08-31
Payer: MEDICARE

## 2018-08-31 VITALS
WEIGHT: 171.94 LBS | OXYGEN SATURATION: 98 % | DIASTOLIC BLOOD PRESSURE: 68 MMHG | HEART RATE: 70 BPM | HEIGHT: 63 IN | BODY MASS INDEX: 30.46 KG/M2 | SYSTOLIC BLOOD PRESSURE: 120 MMHG | RESPIRATION RATE: 18 BRPM

## 2018-08-31 DIAGNOSIS — F41.8 DEPRESSION WITH ANXIETY: Chronic | ICD-10-CM

## 2018-08-31 DIAGNOSIS — E78.5 HYPERLIPIDEMIA, UNSPECIFIED HYPERLIPIDEMIA TYPE: ICD-10-CM

## 2018-08-31 DIAGNOSIS — Z86.711 HISTORY OF PULMONARY EMBOLISM: Chronic | ICD-10-CM

## 2018-08-31 DIAGNOSIS — J43.9 PULMONARY EMPHYSEMA, UNSPECIFIED EMPHYSEMA TYPE: Chronic | ICD-10-CM

## 2018-08-31 DIAGNOSIS — G89.4 CHRONIC PAIN SYNDROME: ICD-10-CM

## 2018-08-31 DIAGNOSIS — F41.9 ANXIETY: ICD-10-CM

## 2018-08-31 DIAGNOSIS — Z87.898 H/O DIZZINESS: Primary | ICD-10-CM

## 2018-08-31 PROCEDURE — 99999 PR PBB SHADOW E&M-EST. PATIENT-LVL III: CPT | Mod: PBBFAC,,, | Performed by: INTERNAL MEDICINE

## 2018-08-31 PROCEDURE — 99499 UNLISTED E&M SERVICE: CPT | Mod: HCNC,S$GLB,, | Performed by: INTERNAL MEDICINE

## 2018-08-31 PROCEDURE — 99214 OFFICE O/P EST MOD 30 MIN: CPT | Mod: S$GLB,,, | Performed by: INTERNAL MEDICINE

## 2018-08-31 PROCEDURE — 3078F DIAST BP <80 MM HG: CPT | Mod: CPTII,S$GLB,, | Performed by: INTERNAL MEDICINE

## 2018-08-31 PROCEDURE — 3074F SYST BP LT 130 MM HG: CPT | Mod: CPTII,S$GLB,, | Performed by: INTERNAL MEDICINE

## 2018-08-31 RX ORDER — DULOXETIN HYDROCHLORIDE 20 MG/1
20 CAPSULE, DELAYED RELEASE ORAL DAILY
Qty: 90 CAPSULE | Refills: 3 | Status: SHIPPED | OUTPATIENT
Start: 2018-08-31 | End: 2020-01-23

## 2018-08-31 RX ORDER — METOPROLOL SUCCINATE 25 MG/1
25 TABLET, EXTENDED RELEASE ORAL DAILY
Qty: 90 TABLET | Refills: 3 | Status: SHIPPED | OUTPATIENT
Start: 2018-08-31 | End: 2018-09-24 | Stop reason: SDUPTHER

## 2018-08-31 RX ORDER — ALPRAZOLAM 0.5 MG/1
0.5 TABLET ORAL 2 TIMES DAILY PRN
Qty: 60 TABLET | Refills: 0 | Status: SHIPPED | OUTPATIENT
Start: 2018-08-31 | End: 2018-08-31 | Stop reason: SDUPTHER

## 2018-08-31 RX ORDER — BUDESONIDE AND FORMOTEROL FUMARATE DIHYDRATE 160; 4.5 UG/1; UG/1
1 AEROSOL RESPIRATORY (INHALATION) EVERY 12 HOURS
Qty: 1 INHALER | Refills: 12 | Status: SHIPPED | OUTPATIENT
Start: 2018-08-31 | End: 2019-01-25

## 2018-08-31 RX ORDER — OMEPRAZOLE 40 MG/1
40 CAPSULE, DELAYED RELEASE ORAL DAILY
Qty: 90 CAPSULE | Refills: 3 | Status: SHIPPED | OUTPATIENT
Start: 2018-08-31 | End: 2019-01-25

## 2018-08-31 RX ORDER — ROSUVASTATIN CALCIUM 10 MG/1
TABLET, COATED ORAL
Qty: 90 TABLET | Refills: 3 | Status: SHIPPED | OUTPATIENT
Start: 2018-08-31 | End: 2019-10-28 | Stop reason: SDUPTHER

## 2018-08-31 RX ORDER — SERTRALINE HYDROCHLORIDE 50 MG/1
50 TABLET, FILM COATED ORAL DAILY
Qty: 60 TABLET | Refills: 11 | Status: SHIPPED | OUTPATIENT
Start: 2018-08-31 | End: 2019-01-25 | Stop reason: SDUPTHER

## 2018-08-31 RX ORDER — ALPRAZOLAM 0.5 MG/1
0.5 TABLET ORAL 2 TIMES DAILY PRN
Qty: 60 TABLET | Refills: 1 | Status: SHIPPED | OUTPATIENT
Start: 2018-08-31 | End: 2019-04-10 | Stop reason: SDUPTHER

## 2018-08-31 RX ORDER — WARFARIN 4 MG/1
TABLET ORAL
Qty: 90 TABLET | Refills: 3
Start: 2018-08-31 | End: 2019-02-18 | Stop reason: SDUPTHER

## 2018-08-31 NOTE — PROGRESS NOTES
"Subjective:      Patient ID: Alessandra Hidalgo is a 82 y.o. female.    Chief Complaint: Dizziness    HPI: 82 y.o. White female, has had intermittent bouts of dizziness in morning.  Not related to position,movement. No vertigo. No change in hearing.  No headache, nor change of medication/timing of....  Since her knee replacement surgery, PE complication, she has become very much of a stay at home.  Does not leave her home at all.  Others do the shopping, come to her.  Depressed.  Also has been on anxiolytics for decades.        Review of Systems   Constitutional: Positive for activity change. Negative for appetite change.   HENT: Negative.    Eyes: Negative.    Respiratory: Negative.    Cardiovascular: Negative.    Gastrointestinal: Negative.    Endocrine: Negative.    Genitourinary: Negative.    Musculoskeletal: Positive for arthralgias.   Skin: Negative.    Allergic/Immunologic: Negative.    Neurological: Positive for dizziness. Negative for tremors, seizures, syncope, facial asymmetry, speech difficulty, light-headedness, numbness and headaches.   Hematological: Negative.    Psychiatric/Behavioral: Negative for sleep disturbance. The patient is nervous/anxious.        Objective:   /68 (BP Location: Left arm, Patient Position: Sitting, BP Method: Large (Manual))   Pulse 70   Resp 18   Ht 5' 3" (1.6 m)   Wt 78 kg (171 lb 15.3 oz)   SpO2 98%   BMI 30.46 kg/m²     Physical Exam   Constitutional: She is oriented to person, place, and time. She appears well-developed and well-nourished.   HENT:   Head: Normocephalic and atraumatic.   Right Ear: Tympanic membrane, external ear and ear canal normal.   Left Ear: Tympanic membrane, external ear and ear canal normal.   Nose: Nose normal.   Mouth/Throat: Uvula is midline and oropharynx is clear and moist. No posterior oropharyngeal edema.   Eyes: Conjunctivae and EOM are normal.   Neck: Normal range of motion. Neck supple. Normal carotid pulses and no JVD present. " Carotid bruit is not present. No thyroid mass present.   Cardiovascular: Normal rate, regular rhythm and normal heart sounds.   Pulmonary/Chest: Effort normal and breath sounds normal.   Abdominal: Soft. Bowel sounds are normal.   Musculoskeletal: She exhibits deformity.   Prosthetic knee swelling, chronic.   Neurological: She is alert and oriented to person, place, and time.   Skin: Skin is warm and dry.   Psychiatric: She has a normal mood and affect. Her behavior is normal.   Nursing note and vitals reviewed.      Assessment:     1. H/O dizziness    2. Hyperlipidemia, unspecified hyperlipidemia type    3. Depression with anxiety    4. Chronic pain syndrome    5. Pulmonary emphysema, unspecified emphysema type    6. History of pulmonary embolism    7. Anxiety      Plan:     H/O dizziness    Hyperlipidemia, unspecified hyperlipidemia type  -     rosuvastatin (CRESTOR) 10 MG tablet; TAKE 1 TABLET BY MOUTH ONCE DAILY FOR CHOLESTEROL.  Dispense: 90 tablet; Refill: 3    Depression with anxiety  -     DULoxetine (CYMBALTA) 20 MG capsule; Take 1 capsule (20 mg total) by mouth once daily.  Dispense: 90 capsule; Refill: 3    Chronic pain syndrome  -     DULoxetine (CYMBALTA) 20 MG capsule; Take 1 capsule (20 mg total) by mouth once daily.  Dispense: 90 capsule; Refill: 3    Pulmonary emphysema, unspecified emphysema type  -     budesonide-formoterol 160-4.5 mcg (SYMBICORT) 160-4.5 mcg/actuation HFAA; Inhale 1 puff into the lungs every 12 (twelve) hours.  Dispense: 1 Inhaler; Refill: 12    History of pulmonary embolism  -     warfarin (COUMADIN) 4 MG tablet; 2 mg on Wednesday and 4 mg all other days (started 6/27/18)  Dispense: 90 tablet; Refill: 3    Anxiety  -     Discontinue: ALPRAZolam (XANAX) 0.5 MG tablet; Take 1 tablet (0.5 mg total) by mouth 2 (two) times daily as needed for Anxiety.  Dispense: 60 tablet; Refill: 0  -     ALPRAZolam (XANAX) 0.5 MG tablet; Take 1 tablet (0.5 mg total) by mouth 2 (two) times daily as  needed for Anxiety.  Dispense: 60 tablet; Refill: 1    Other orders  -     omeprazole (PRILOSEC) 40 MG capsule; Take 1 capsule (40 mg total) by mouth once daily.  Dispense: 90 capsule; Refill: 3  -     metoprolol succinate (TOPROL-XL) 25 MG 24 hr tablet; Take 1 tablet (25 mg total) by mouth once daily.  Dispense: 90 tablet; Refill: 3  -     sertraline (ZOLOFT) 50 MG tablet; Take 1 tablet (50 mg total) by mouth once daily. Take 11/2 ofelia by mouth once a day  Dispense: 60 tablet; Refill: 11

## 2018-09-24 RX ORDER — METOPROLOL SUCCINATE 25 MG/1
25 TABLET, EXTENDED RELEASE ORAL DAILY
Qty: 90 TABLET | Refills: 3 | Status: SHIPPED | OUTPATIENT
Start: 2018-09-24 | End: 2019-08-27 | Stop reason: SDUPTHER

## 2018-09-24 NOTE — TELEPHONE ENCOUNTER
----- Message from Nickie Jessica sent at 9/24/2018  8:51 AM CDT -----  Contact: self, [phone]  Patient requests her Toprol prescription refill sent to [facility]. Please advise.

## 2018-10-16 ENCOUNTER — TELEPHONE (OUTPATIENT)
Dept: INTERNAL MEDICINE | Facility: CLINIC | Age: 83
End: 2018-10-16

## 2018-10-16 NOTE — TELEPHONE ENCOUNTER
----- Message from Ebony Frey sent at 10/16/2018  9:55 AM CDT -----  Contact: [phone]/ self   Pt its requesting lab work tet results . Please advise

## 2018-12-24 ENCOUNTER — TELEPHONE (OUTPATIENT)
Dept: INTERNAL MEDICINE | Facility: CLINIC | Age: 83
End: 2018-12-24

## 2018-12-24 NOTE — TELEPHONE ENCOUNTER
----- Message from Yana Boyer sent at 12/24/2018 11:06 AM CST -----  Contact: [phone]/self  Patient requesting to speak with you regarding instructions for medication. Please advise.

## 2019-01-11 DIAGNOSIS — F41.9 ANXIETY: ICD-10-CM

## 2019-01-11 RX ORDER — ALPRAZOLAM 0.5 MG/1
0.5 TABLET ORAL 2 TIMES DAILY PRN
Qty: 60 TABLET | Refills: 1 | Status: CANCELLED | OUTPATIENT
Start: 2019-01-11

## 2019-01-11 NOTE — TELEPHONE ENCOUNTER
----- Message from Bismark Fomran sent at 1/11/2019 10:51 AM CST -----  Contact: self/[phone]  Patient is requesting a refill on her medication.      Please call and advise when ready for .  She would like it called in to the pharmacy if at all possible.    ALPRAZolam (XANAX) 0.5 MG tablet    [place], [place] - [facility] [address], [place]

## 2019-01-11 NOTE — TELEPHONE ENCOUNTER
Called in to [facility]  ALPRAZolam (XANAX) 0.5 MG tablet 60 tablet 1 8/31/2018  No   Sig - Route: Take 1 tablet (0.5 mg total) by mouth 2 (two) times daily as needed for      Notified pt

## 2019-01-11 NOTE — TELEPHONE ENCOUNTER
----- Message from eDmetra Nunez sent at 1/11/2019  2:59 PM CST -----  Contact: [phone]/self  Patient is requesting to have a refill of   ALPRAZolam (XANAX) 0.5 MG tablet. Take 1 tablet (0.5 mg total) by mouth 2 (two) times daily as needed for Anxiety. - Oral    sent to [facility] - [place] - [address], [place] . Please advise.

## 2019-01-25 ENCOUNTER — TELEPHONE (OUTPATIENT)
Dept: FAMILY MEDICINE | Facility: CLINIC | Age: 84
End: 2019-01-25

## 2019-01-25 ENCOUNTER — OFFICE VISIT (OUTPATIENT)
Dept: FAMILY MEDICINE | Facility: CLINIC | Age: 84
End: 2019-01-25
Payer: MEDICARE

## 2019-01-25 VITALS
RESPIRATION RATE: 16 BRPM | WEIGHT: 178.88 LBS | SYSTOLIC BLOOD PRESSURE: 138 MMHG | OXYGEN SATURATION: 96 % | BODY MASS INDEX: 31.7 KG/M2 | TEMPERATURE: 98 F | HEIGHT: 63 IN | HEART RATE: 75 BPM | DIASTOLIC BLOOD PRESSURE: 78 MMHG

## 2019-01-25 DIAGNOSIS — J30.89 NON-SEASONAL ALLERGIC RHINITIS DUE TO OTHER ALLERGIC TRIGGER: ICD-10-CM

## 2019-01-25 DIAGNOSIS — Z86.711 HISTORY OF PULMONARY EMBOLISM: ICD-10-CM

## 2019-01-25 DIAGNOSIS — J43.9 PULMONARY EMPHYSEMA, UNSPECIFIED EMPHYSEMA TYPE: Chronic | ICD-10-CM

## 2019-01-25 DIAGNOSIS — I10 ESSENTIAL HYPERTENSION: Primary | ICD-10-CM

## 2019-01-25 DIAGNOSIS — F41.8 DEPRESSION WITH ANXIETY: ICD-10-CM

## 2019-01-25 DIAGNOSIS — N18.30 STAGE 3 CHRONIC KIDNEY DISEASE: ICD-10-CM

## 2019-01-25 PROCEDURE — 3288F PR FALLS RISK ASSESSMENT DOCUMENTED: ICD-10-PCS | Mod: HCNC,CPTII,S$GLB, | Performed by: INTERNAL MEDICINE

## 2019-01-25 PROCEDURE — 1100F PTFALLS ASSESS-DOCD GE2>/YR: CPT | Mod: HCNC,CPTII,S$GLB, | Performed by: INTERNAL MEDICINE

## 2019-01-25 PROCEDURE — 99499 UNLISTED E&M SERVICE: CPT | Mod: HCNC,S$GLB,, | Performed by: INTERNAL MEDICINE

## 2019-01-25 PROCEDURE — 99214 PR OFFICE/OUTPT VISIT, EST, LEVL IV, 30-39 MIN: ICD-10-PCS | Mod: HCNC,S$GLB,, | Performed by: INTERNAL MEDICINE

## 2019-01-25 PROCEDURE — 99499 RISK ADDL DX/OHS AUDIT: ICD-10-PCS | Mod: HCNC,S$GLB,, | Performed by: INTERNAL MEDICINE

## 2019-01-25 PROCEDURE — 3288F FALL RISK ASSESSMENT DOCD: CPT | Mod: HCNC,CPTII,S$GLB, | Performed by: INTERNAL MEDICINE

## 2019-01-25 PROCEDURE — 3078F PR MOST RECENT DIASTOLIC BLOOD PRESSURE < 80 MM HG: ICD-10-PCS | Mod: HCNC,CPTII,S$GLB, | Performed by: INTERNAL MEDICINE

## 2019-01-25 PROCEDURE — 1100F PR PT FALLS ASSESS DOC 2+ FALLS/FALL W/INJURY/YR: ICD-10-PCS | Mod: HCNC,CPTII,S$GLB, | Performed by: INTERNAL MEDICINE

## 2019-01-25 PROCEDURE — 3078F DIAST BP <80 MM HG: CPT | Mod: HCNC,CPTII,S$GLB, | Performed by: INTERNAL MEDICINE

## 2019-01-25 PROCEDURE — 3075F PR MOST RECENT SYSTOLIC BLOOD PRESS GE 130-139MM HG: ICD-10-PCS | Mod: HCNC,CPTII,S$GLB, | Performed by: INTERNAL MEDICINE

## 2019-01-25 PROCEDURE — 99999 PR PBB SHADOW E&M-EST. PATIENT-LVL IV: CPT | Mod: PBBFAC,HCNC,, | Performed by: INTERNAL MEDICINE

## 2019-01-25 PROCEDURE — 3075F SYST BP GE 130 - 139MM HG: CPT | Mod: HCNC,CPTII,S$GLB, | Performed by: INTERNAL MEDICINE

## 2019-01-25 PROCEDURE — 99214 OFFICE O/P EST MOD 30 MIN: CPT | Mod: HCNC,S$GLB,, | Performed by: INTERNAL MEDICINE

## 2019-01-25 PROCEDURE — 99999 PR PBB SHADOW E&M-EST. PATIENT-LVL IV: ICD-10-PCS | Mod: PBBFAC,HCNC,, | Performed by: INTERNAL MEDICINE

## 2019-01-25 RX ORDER — SERTRALINE HYDROCHLORIDE 100 MG/1
100 TABLET, FILM COATED ORAL DAILY
Qty: 30 TABLET | Refills: 2 | Status: SHIPPED | OUTPATIENT
Start: 2019-01-25 | End: 2019-05-22 | Stop reason: SDUPTHER

## 2019-01-25 NOTE — TELEPHONE ENCOUNTER
----- Message from Nickie Lopez sent at 1/25/2019  2:18 PM CST -----  Contact: [facility]on Luis Alfredo, [phone]  Called in requesting clarification on sertraline prescription received. Please advise.

## 2019-01-25 NOTE — PATIENT INSTRUCTIONS
We have reviewed your prescription medications. We will try increasing sertraline. I would also like to check some blood work to make sure that everything is ok. Your sinuses looked ok. Bp was good. Follow-up in about a month with me or Dr Adams,

## 2019-01-28 NOTE — PROGRESS NOTES
NEW PATIENT VISIT INTERNAL MEDICINE    Patient Active Problem List   Diagnosis    Essential hypertension    Hypercholesterolemia    History of pulmonary embolism    Depression with anxiety    Gastroesophageal reflux disease without esophagitis    Pulmonary emphysema    Allergic rhinitis    Stage 3 chronic kidney disease       CC:   Chief Complaint   Patient presents with    Dizziness    Fatigue    Depression       HPI: Alessandra Hidalgo   is a 83 y.o. female   I have reviewed the PMH, SH and FH for this patient. This is a new patient to me. She sees Dr Adams. She is here for evaluation of fatigue, depression, lack of energy. It started about a month ago. Her  has not been well. He has back problems and cannot walk. Her son has moved in with them and he has been a big help. He is getting a divorce, and the patient is upset about this too. She has bad knees. She also has headaches. She  has a past medical history of Anxiety, Hyperlipidemia, Hypertension, and Pulmonary embolism.Her Bp looks ok today at 138/78. She is currently on sertraline 50 mg a day.       Depression   Visit Type: follow-up  Patient presents with the following symptoms: anhedonia, depressed mood, excessive worry, fatigue, hypersomnia, insomnia, malaise, nervousness/anxiety and thoughts of death.  Patient is not experiencing: chest pain, choking sensation, compulsions, confusion, decreased concentration, dizziness, dry mouth, feelings of hopelessness, feelings of worthlessness, hyperventilation, impotence, irritability, memory impairment, muscle tension, nausea, obsessions, palpitations, panic, psychomotor agitation, psychomotor retardation, restlessness, shortness of breath, suicidal ideas, suicidal planning, weight gain and weight loss.  Frequency of symptoms: constantly   Severity: moderate   Side effects:  Insomnia and headaches        ROS: Review of Systems   Constitutional: Negative for chills, fatigue, fever, irritability,  weight gain and weight loss.   HENT: Negative for congestion, ear discharge, ear pain, rhinorrhea and sore throat.    Eyes: Negative for discharge, redness and itching.   Respiratory: Negative for cough, choking, chest tightness, shortness of breath and wheezing.    Cardiovascular: Negative for chest pain, palpitations and leg swelling.   Gastrointestinal: Negative for abdominal pain, constipation, diarrhea, nausea and vomiting.   Endocrine: Negative for cold intolerance and heat intolerance.   Genitourinary: Negative for dysuria, flank pain, frequency, hematuria and impotence.   Musculoskeletal: Negative for arthralgias, back pain, joint swelling and myalgias.   Skin: Negative for color change and rash.   Neurological: Negative for dizziness, tremors, numbness and headaches.   Psychiatric/Behavioral: Positive for depression. Negative for confusion, decreased concentration, dysphoric mood, sleep disturbance and suicidal ideas. The patient is nervous/anxious and has insomnia.        PMHX:   Past Medical History:   Diagnosis Date    Anxiety     Hyperlipidemia     Hypertension     Pulmonary embolism        PSHX:   Past Surgical History:   Procedure Laterality Date    APPENDECTOMY      CATARACT EXTRACTION W/ INTRAOCULAR LENS  IMPLANT, BILATERAL      HYSTERECTOMY      Partial     LEFT KNEE REPLACEMENT         FAMHX:   Family History   Problem Relation Age of Onset    Cancer Mother     Stroke Daughter     Heart disease Neg Hx        SOCHX:   Social History     Socioeconomic History    Marital status:      Spouse name: None    Number of children: None    Years of education: None    Highest education level: None   Social Needs    Financial resource strain: None    Food insecurity - worry: None    Food insecurity - inability: None    Transportation needs - medical: None    Transportation needs - non-medical: None   Occupational History    None   Tobacco Use    Smoking status: Former Smoker      "Packs/day: 0.00     Years: 4.00     Pack years: 0.00    Smokeless tobacco: Former User   Substance and Sexual Activity    Alcohol use: Yes     Alcohol/week: 0.6 oz     Types: 1 Glasses of wine per week    Drug use: No    Sexual activity: Not Currently   Other Topics Concern    None   Social History Narrative    Lives with her  of 58 years. They have 4 children. She is originally from [place]. She is sedentary. 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        ALLERGIES:   Review of patient's allergies indicates:   Allergen Reactions    Prozac [fluoxetine]      dizziness       MEDS:   Current Outpatient Medications:     ALPRAZolam (XANAX) 0.5 MG tablet, Take 1 tablet (0.5 mg total) by mouth 2 (two) times daily as needed for Anxiety., Disp: 60 tablet, Rfl: 1    DULoxetine (CYMBALTA) 20 MG capsule, Take 1 capsule (20 mg total) by mouth once daily., Disp: 90 capsule, Rfl: 3    metoprolol succinate (TOPROL-XL) 25 MG 24 hr tablet, Take 1 tablet (25 mg total) by mouth once daily., Disp: 90 tablet, Rfl: 3    omeprazole (PRILOSEC) 40 MG capsule, Take 1 capsule (40 mg total) by mouth once daily., Disp: 90 capsule, Rfl: 3    rosuvastatin (CRESTOR) 10 MG tablet, TAKE 1 TABLET BY MOUTH ONCE DAILY FOR CHOLESTEROL., Disp: 90 tablet, Rfl: 3    sertraline (ZOLOFT) 100 MG tablet, Take 1 tablet (100 mg total) by mouth once daily. Take 11/2 ofelia by mouth once a day, Disp: 30 tablet, Rfl: 2    warfarin (COUMADIN) 4 MG tablet, 2 mg on Wednesday and 4 mg all other days (started 6/27/18), Disp: 90 tablet, Rfl: 3    OBJECTIVE:   Vitals:    01/25/19 1334   BP: 138/78   BP Location: Left arm   Patient Position: Sitting   BP Method: Medium (Manual)   Pulse: 75   Resp: 16   Temp: 97.9 °F (36.6 °C)   TempSrc: Oral   SpO2: 96%   Weight: 81.1 kg (178 lb 14.4 oz)   Height: 5' 3" (1.6 m)     Body mass index is 31.69 kg/m².    Physical Exam   Constitutional: She appears well-developed and " well-nourished.   HENT:   Head: Normocephalic and atraumatic.   Right Ear: External ear normal. Tympanic membrane is not erythematous. No middle ear effusion.   Left Ear: External ear normal. Tympanic membrane is not erythematous.  No middle ear effusion.   Mouth/Throat: No posterior oropharyngeal edema or posterior oropharyngeal erythema. No tonsillar exudate.   Eyes: Conjunctivae and EOM are normal. Pupils are equal, round, and reactive to light.   Neck: No thyromegaly present.   Cardiovascular: Normal rate, regular rhythm, normal heart sounds and intact distal pulses.   No murmur heard.  Pulmonary/Chest: Effort normal and breath sounds normal. She has no wheezes.   Abdominal: She exhibits no distension. There is no tenderness.   Musculoskeletal: She exhibits no edema or tenderness.   Lymphadenopathy:     She has no cervical adenopathy.   Neurological: She displays normal reflexes. No cranial nerve deficit.   Skin: Skin is warm and dry. No rash noted.   Psychiatric: She has a normal mood and affect. Her behavior is normal.         Depression Patient Health Questionnaire 1/25/2019 4/11/2018   In the last two weeks how often have you had little interest or pleasure in doing things 3 0   In the last two weeks how often have you felt down, depressed or hopeless 3 0   PHQ-2 Total Score 6 0   In the last two weeks how often have you had trouble falling or staying asleep, or sleeping too much 0 -   In the last two weeks how often have you felt tired or having little energy 3 -   In the last two weeks how often have you had a poor appetite or overeating 0 -   In the last two weeks how often have you felt bad about yourself - or that you are a failure or have let yourself or your family down 0 -   In the last two weeks how often have you had trouble concentrating on things, such as reading the newspaper or watching television 0 -   In the last two weeks how often have you been moving or speaking so slowly that other people  could have noticed. Or the opposite - being so fidgety or restless that you have been moving around a lot more than usual. 0 -   In the last two weeks how often have you had thoughts that you would be better off dead, or of hurting yourself 0 -   If you checked off any problems, how difficult have these problems made it for you to do your work, take care of things at home or get along with other people? Very difficult -   Total Score 9 -       PERTINENT RESULTS:   None    ASSESSMENT:  Problem List Items Addressed This Visit        Psychiatric    Depression with anxiety (Chronic) - let's increase sertraline to 100 mg a day. Recheck in a month. She has a lot of stresses at home with sick  and son having moved in.     Relevant Orders    TSH (Completed)       Pulmonary    Pulmonary emphysema (Chronic) - stable. She is on warfarin       Cardiac/Vascular    Essential hypertension - Primary (Chronic) - bp looked ok. Recheck next time.     Relevant Orders    CBC auto differential (Completed)    Comprehensive metabolic panel (Completed)       Renal/    Stage 3 chronic kidney disease - stable. We will check lab.       Hematology    History of pulmonary embolism (Chronic) - on coumadin. Stable.        Other    Allergic rhinitis - try flonase otc.           PLAN:   Orders Placed This Encounter    CBC auto differential    Comprehensive metabolic panel    TSH    sertraline (ZOLOFT) 100 MG tablet           Follow-up with me or Dr Adams  in 1 month.

## 2019-01-31 ENCOUNTER — TELEPHONE (OUTPATIENT)
Dept: FAMILY MEDICINE | Facility: CLINIC | Age: 84
End: 2019-01-31

## 2019-01-31 NOTE — TELEPHONE ENCOUNTER
----- Message from Ebony Frey sent at 1/31/2019  3:11 PM CST -----  Contact: [phone]/ SELF   Pt its requesting lab work test results done 01/25/19. Please advise

## 2019-02-04 ENCOUNTER — TELEPHONE (OUTPATIENT)
Dept: FAMILY MEDICINE | Facility: CLINIC | Age: 84
End: 2019-02-04

## 2019-02-05 ENCOUNTER — TELEPHONE (OUTPATIENT)
Dept: FAMILY MEDICINE | Facility: CLINIC | Age: 84
End: 2019-02-05

## 2019-02-05 NOTE — TELEPHONE ENCOUNTER
----- Message from Janine Lovett MD sent at 2/4/2019  4:03 PM CST -----  TSH is normal. CBC was normal. The blood chemistries, kidney function test  and liver function tests were within normal range.

## 2019-02-18 DIAGNOSIS — Z86.711 HISTORY OF PULMONARY EMBOLISM: Chronic | ICD-10-CM

## 2019-02-18 RX ORDER — WARFARIN 4 MG/1
TABLET ORAL
Qty: 90 TABLET | Refills: 3
Start: 2019-02-18 | End: 2019-02-19 | Stop reason: SDUPTHER

## 2019-02-18 NOTE — TELEPHONE ENCOUNTER
----- Message from Jian Kauffman sent at 2/18/2019  9:42 AM CST -----  Contact: [phone]  Patient would like refill of warfarin (COUMADIN) 4 MG [facility] Please call.

## 2019-02-19 RX ORDER — WARFARIN 4 MG/1
4 TABLET ORAL DAILY
COMMUNITY
End: 2020-02-17 | Stop reason: SDUPTHER

## 2019-02-19 NOTE — TELEPHONE ENCOUNTER
----- Message from Chasity Whitaker sent at 2/19/2019  9:29 AM CST -----  Contact: self / [phone]  Patient is requesting a refill on the below. Please advise    She will be out by tonight    warfarin (COUMADIN) 4 MG tablet      Pharmacy     [place], [place] - [facility] [address], [address]

## 2019-04-10 ENCOUNTER — OFFICE VISIT (OUTPATIENT)
Dept: FAMILY MEDICINE | Facility: CLINIC | Age: 84
End: 2019-04-10
Payer: MEDICARE

## 2019-04-10 VITALS
DIASTOLIC BLOOD PRESSURE: 70 MMHG | HEART RATE: 80 BPM | SYSTOLIC BLOOD PRESSURE: 118 MMHG | OXYGEN SATURATION: 98 % | HEIGHT: 63 IN | WEIGHT: 175.69 LBS | TEMPERATURE: 99 F | BODY MASS INDEX: 31.13 KG/M2

## 2019-04-10 DIAGNOSIS — K52.9 ACUTE GASTROENTERITIS: Primary | ICD-10-CM

## 2019-04-10 DIAGNOSIS — K92.1 HEMATOCHEZIA: ICD-10-CM

## 2019-04-10 DIAGNOSIS — F41.9 ANXIETY: ICD-10-CM

## 2019-04-10 PROCEDURE — 1101F PT FALLS ASSESS-DOCD LE1/YR: CPT | Mod: HCNC,CPTII,S$GLB, | Performed by: NURSE PRACTITIONER

## 2019-04-10 PROCEDURE — 99999 PR PBB SHADOW E&M-EST. PATIENT-LVL IV: CPT | Mod: PBBFAC,HCNC,, | Performed by: NURSE PRACTITIONER

## 2019-04-10 PROCEDURE — 99213 OFFICE O/P EST LOW 20 MIN: CPT | Mod: HCNC,S$GLB,, | Performed by: NURSE PRACTITIONER

## 2019-04-10 PROCEDURE — 3074F PR MOST RECENT SYSTOLIC BLOOD PRESSURE < 130 MM HG: ICD-10-PCS | Mod: HCNC,CPTII,S$GLB, | Performed by: NURSE PRACTITIONER

## 2019-04-10 PROCEDURE — 3078F PR MOST RECENT DIASTOLIC BLOOD PRESSURE < 80 MM HG: ICD-10-PCS | Mod: HCNC,CPTII,S$GLB, | Performed by: NURSE PRACTITIONER

## 2019-04-10 PROCEDURE — 1101F PR PT FALLS ASSESS DOC 0-1 FALLS W/OUT INJ PAST YR: ICD-10-PCS | Mod: HCNC,CPTII,S$GLB, | Performed by: NURSE PRACTITIONER

## 2019-04-10 PROCEDURE — 99213 PR OFFICE/OUTPT VISIT, EST, LEVL III, 20-29 MIN: ICD-10-PCS | Mod: HCNC,S$GLB,, | Performed by: NURSE PRACTITIONER

## 2019-04-10 PROCEDURE — 3078F DIAST BP <80 MM HG: CPT | Mod: HCNC,CPTII,S$GLB, | Performed by: NURSE PRACTITIONER

## 2019-04-10 PROCEDURE — 99999 PR PBB SHADOW E&M-EST. PATIENT-LVL IV: ICD-10-PCS | Mod: PBBFAC,HCNC,, | Performed by: NURSE PRACTITIONER

## 2019-04-10 PROCEDURE — 3074F SYST BP LT 130 MM HG: CPT | Mod: HCNC,CPTII,S$GLB, | Performed by: NURSE PRACTITIONER

## 2019-04-10 RX ORDER — ALPRAZOLAM 0.5 MG/1
0.5 TABLET ORAL 2 TIMES DAILY PRN
Qty: 60 TABLET | Refills: 1 | Status: SHIPPED | OUTPATIENT
Start: 2019-04-10 | End: 2019-04-10 | Stop reason: SDUPTHER

## 2019-04-10 RX ORDER — ALPRAZOLAM 0.5 MG/1
0.5 TABLET ORAL 2 TIMES DAILY PRN
Qty: 60 TABLET | Refills: 1 | Status: SHIPPED | OUTPATIENT
Start: 2019-04-10 | End: 2019-08-27 | Stop reason: SDUPTHER

## 2019-04-10 RX ORDER — DICYCLOMINE HYDROCHLORIDE 10 MG/1
10 CAPSULE ORAL 3 TIMES DAILY PRN
Qty: 30 CAPSULE | Refills: 0 | Status: SHIPPED | OUTPATIENT
Start: 2019-04-10 | End: 2020-01-24

## 2019-04-10 RX ORDER — OMEPRAZOLE 40 MG/1
40 CAPSULE, DELAYED RELEASE ORAL EVERY MORNING
Refills: 3 | COMMUNITY
Start: 2019-04-08 | End: 2020-01-24

## 2019-04-10 NOTE — PROGRESS NOTES
Subjective:       Patient ID: Alessandra Hidalgo is a 83 y.o. female.    Chief Complaint: Abdominal Pain (pain started this morning. Pt states went to bathroom at home and passed blood in stool.)    82 y/o female with multiple diagnoses as listed in problem list and medical history presents to clinic for urgent care visit with c/o abdominal pain and diarrhea.           Abdominal Pain   This is a new problem. The current episode started today. The problem has been gradually improving. The pain is located in the LLQ and RLQ. The pain is at a severity of 5/10. The quality of the pain is cramping. The abdominal pain does not radiate. Associated symptoms include diarrhea and hematochezia. Pertinent negatives include no arthralgias, belching, constipation, fever, flatus, frequency, headaches, hematuria, melena, myalgias, nausea, vomiting or weight loss. Nothing aggravates the pain. The pain is relieved by nothing. She has tried nothing for the symptoms. There is no history of colon cancer, gallstones, GERD, irritable bowel syndrome or pancreatitis. Patient's medical history does not include UTI.   Diarrhea    This is a new problem. The current episode started today. The problem has been gradually improving. The stool consistency is described as blood tinged. Associated symptoms include abdominal pain. Pertinent negatives include no arthralgias, bloating, chills, coughing, fever, headaches, increased  flatus, myalgias, URI, vomiting or weight loss. Nothing aggravates the symptoms. There are no known risk factors. She has tried nothing for the symptoms. There is no history of irritable bowel syndrome or a recent abdominal surgery.       Current Outpatient Medications   Medication Sig Dispense Refill    ALPRAZolam (XANAX) 0.5 MG tablet Take 1 tablet (0.5 mg total) by mouth 2 (two) times daily as needed for Anxiety. 60 tablet 1    DULoxetine (CYMBALTA) 20 MG capsule Take 1 capsule (20 mg total) by mouth once daily. 90 capsule  3    metoprolol succinate (TOPROL-XL) 25 MG 24 hr tablet Take 1 tablet (25 mg total) by mouth once daily. 90 tablet 3    omeprazole (PRILOSEC) 40 MG capsule   3    rosuvastatin (CRESTOR) 10 MG tablet TAKE 1 TABLET BY MOUTH ONCE DAILY FOR CHOLESTEROL. 90 tablet 3    warfarin (COUMADIN) 4 MG tablet Take 4 mg by mouth once daily.      dicyclomine (BENTYL) 10 MG capsule Take 1 capsule (10 mg total) by mouth 3 (three) times daily as needed. 30 capsule 0    sertraline (ZOLOFT) 100 MG tablet Take 1 tablet (100 mg total) by mouth once daily. Take 11/2 ofelia by mouth once a day 30 tablet 2     No current facility-administered medications for this visit.        Past Medical History:   Diagnosis Date    Anxiety     Hyperlipidemia     Hypertension     Pulmonary embolism        Past Surgical History:   Procedure Laterality Date    APPENDECTOMY      CATARACT EXTRACTION W/ INTRAOCULAR LENS  IMPLANT, BILATERAL      HYSTERECTOMY      Partial     LEFT KNEE REPLACEMENT         Family History   Problem Relation Age of Onset    Cancer Mother     Stroke Daughter     Heart disease Neg Hx        Social History     Socioeconomic History    Marital status:      Spouse name: Not on file    Number of children: Not on file    Years of education: Not on file    Highest education level: Not on file   Occupational History    Not on file   Social Needs    Financial resource strain: Not on file    Food insecurity:     Worry: Not on file     Inability: Not on file    Transportation needs:     Medical: Not on file     Non-medical: Not on file   Tobacco Use    Smoking status: Former Smoker     Packs/day: 0.00     Years: 4.00     Pack years: 0.00    Smokeless tobacco: Former User   Substance and Sexual Activity    Alcohol use: Yes     Alcohol/week: 0.6 oz     Types: 1 Glasses of wine per week    Drug use: No    Sexual activity: Not Currently   Lifestyle    Physical activity:     Days per week: Not on file     Minutes  "per session: Not on file    Stress: Not on file   Relationships    Social connections:     Talks on phone: Not on file     Gets together: Not on file     Attends Cheondoism service: Not on file     Active member of club or organization: Not on file     Attends meetings of clubs or organizations: Not on file     Relationship status: Not on file   Other Topics Concern    Not on file   Social History Narrative    Lives with her  of 58 years. They have 4 children. She is originally from [place]. She is sedentary. She is a practicing [employer] and she participates in perpetual [employer].        Review of Systems   Constitutional: Negative for chills, fever and weight loss.   HENT: Negative for congestion.    Respiratory: Negative for cough and shortness of breath.    Cardiovascular: Negative for chest pain.   Gastrointestinal: Positive for abdominal pain, blood in stool, diarrhea and hematochezia. Negative for bloating, constipation, flatus, melena, nausea and vomiting.   Genitourinary: Negative for frequency and hematuria.   Musculoskeletal: Negative for arthralgias and myalgias.   Neurological: Negative for dizziness, syncope, weakness and headaches.         Objective:     Vitals:    04/10/19 1318   BP: 118/70   Pulse: 80   Temp: 98.7 °F (37.1 °C)   TempSrc: Oral   SpO2: 98%   Weight: 79.7 kg (175 lb 11.3 oz)   Height: 5' 3" (1.6 m)          Physical Exam   Constitutional: She is oriented to person, place, and time. She appears well-developed.   HENT:   Head: Normocephalic.   Right Ear: Tympanic membrane and ear canal normal.   Left Ear: Tympanic membrane and ear canal normal.   Nose: Nose normal.   Mouth/Throat: Uvula is midline and oropharynx is clear and moist.   Eyes: Pupils are equal, round, and reactive to light. Conjunctivae are normal.   Neck: Normal range of motion. Neck supple.   Cardiovascular: Normal rate and regular rhythm.   Pulmonary/Chest: Effort normal.   Abdominal: Soft. Normal appearance " and bowel sounds are normal. There is no tenderness.   Musculoskeletal: Normal range of motion.   Neurological: She is alert and oriented to person, place, and time.   Skin: Skin is warm and dry.   Psychiatric: She has a normal mood and affect.         Assessment:         ICD-10-CM ICD-9-CM   1. Acute gastroenteritis K52.9 558.9   2. Hematochezia K92.1 578.1   3. Anxiety F41.9 300.00       Plan:       Acute gastroenteritis  -     dicyclomine (BENTYL) 10 MG capsule; Take 1 capsule (10 mg total) by mouth 3 (three) times daily as needed.  Dispense: 30 capsule; Refill: 0    Hematochezia  -     Occult Blood Stool, CA Screen; Future; Expected date: 04/10/2019    Anxiety  -     Discontinue: ALPRAZolam (XANAX) 0.5 MG tablet; Take 1 tablet (0.5 mg total) by mouth 2 (two) times daily as needed for Anxiety.  Dispense: 60 tablet; Refill: 1  -     ALPRAZolam (XANAX) 0.5 MG tablet; Take 1 tablet (0.5 mg total) by mouth 2 (two) times daily as needed for Anxiety.  Dispense: 60 tablet; Refill: 1      Follow up if symptoms worsen or fail to improve.     Patient's Medications   New Prescriptions    DICYCLOMINE (BENTYL) 10 MG CAPSULE    Take 1 capsule (10 mg total) by mouth 3 (three) times daily as needed.   Previous Medications    DULOXETINE (CYMBALTA) 20 MG CAPSULE    Take 1 capsule (20 mg total) by mouth once daily.    METOPROLOL SUCCINATE (TOPROL-XL) 25 MG 24 HR TABLET    Take 1 tablet (25 mg total) by mouth once daily.    OMEPRAZOLE (PRILOSEC) 40 MG CAPSULE    Take 1 capsule (40 mg total) by mouth once daily.    OMEPRAZOLE (PRILOSEC) 40 MG CAPSULE        ROSUVASTATIN (CRESTOR) 10 MG TABLET    TAKE 1 TABLET BY MOUTH ONCE DAILY FOR CHOLESTEROL.    SERTRALINE (ZOLOFT) 100 MG TABLET    Take 1 tablet (100 mg total) by mouth once daily. Take 11/2 ofelia by mouth once a day    WARFARIN (COUMADIN) 4 MG TABLET    Take 4 mg by mouth once daily.   Modified Medications    Modified Medication Previous Medication    ALPRAZOLAM (XANAX) 0.5 MG  TABLET ALPRAZolam (XANAX) 0.5 MG tablet       Take 1 tablet (0.5 mg total) by mouth 2 (two) times daily as needed for Anxiety.    Take 1 tablet (0.5 mg total) by mouth 2 (two) times daily as needed for Anxiety.   Discontinued Medications    No medications on file

## 2019-04-16 ENCOUNTER — TELEPHONE (OUTPATIENT)
Dept: FAMILY MEDICINE | Facility: CLINIC | Age: 84
End: 2019-04-16

## 2019-04-16 DIAGNOSIS — Z79.01 ENCOUNTER FOR MONITORING COUMADIN THERAPY: ICD-10-CM

## 2019-04-16 DIAGNOSIS — Z51.81 ENCOUNTER FOR MONITORING COUMADIN THERAPY: ICD-10-CM

## 2019-04-16 NOTE — TELEPHONE ENCOUNTER
----- Message from [facility] CARMENZA Adams MD sent at 4/16/2019  1:17 PM CDT -----  Plz call pt and ask her to hold her coum,fabiola for 2 days, tell her INR is 4.4  Ask her if she has been taking any aspirin/ibuprofen or eating more greens?  Repeat PT/INR in 1 week.  TSA

## 2019-04-16 NOTE — TELEPHONE ENCOUNTER
Spoke with pt informed her of her test results and also informed her to repeat her lab work in 1 week. Pt verbalized understanding

## 2019-04-17 ENCOUNTER — TELEPHONE (OUTPATIENT)
Dept: FAMILY MEDICINE | Facility: CLINIC | Age: 84
End: 2019-04-17

## 2019-04-17 NOTE — TELEPHONE ENCOUNTER
----- Message from JOHNY Hilton sent at 4/17/2019  8:37 AM CDT -----  Please inform patient fecal occult blood test was negative. Recommended repeat screening in one year.

## 2019-04-17 NOTE — TELEPHONE ENCOUNTER
----- Message from Chasity Whitaker sent at 4/17/2019 11:34 AM CDT -----  Contact: self / [phone]  Patient is returning a call from your office. Please advise

## 2019-05-13 ENCOUNTER — PES CALL (OUTPATIENT)
Dept: ADMINISTRATIVE | Facility: CLINIC | Age: 84
End: 2019-05-13

## 2019-05-22 RX ORDER — SERTRALINE HYDROCHLORIDE 100 MG/1
TABLET, FILM COATED ORAL
Qty: 30 TABLET | Refills: 2 | Status: SHIPPED | OUTPATIENT
Start: 2019-05-22 | End: 2019-06-25 | Stop reason: SDUPTHER

## 2019-06-25 RX ORDER — SERTRALINE HYDROCHLORIDE 100 MG/1
100 TABLET, FILM COATED ORAL DAILY
Qty: 30 TABLET | Refills: 2 | Status: SHIPPED | OUTPATIENT
Start: 2019-06-25 | End: 2019-08-27 | Stop reason: SDUPTHER

## 2019-06-25 NOTE — TELEPHONE ENCOUNTER
----- Message from Demetra Nunez sent at 6/25/2019  9:26 AM CDT -----  Contact: [phone] /SELF  Type:  RX Refill Request    Who Called: self  Refill or New Rx:Refill  RX Name and Strength:sertraline (ZOLOFT) 100 MG tablet  How is the patient currently taking it? (ex. 1XDay):TAKE ONE TABLET BY MOUTH EVERY DAY  Is this a 30 day or 90 day RX:30/2 refills  Preferred Pharmacy with phone number:[facility]  [place], [place] - [address], [facility] [address]  Local or Mail Order:local  Ordering Provider:  Would the patient rather a call back or a response via [facility]? call  Best Call Back Number:  Additional Information:

## 2019-08-27 ENCOUNTER — OFFICE VISIT (OUTPATIENT)
Dept: INTERNAL MEDICINE | Facility: CLINIC | Age: 84
End: 2019-08-27
Payer: MEDICARE

## 2019-08-27 VITALS
BODY MASS INDEX: 30.65 KG/M2 | WEIGHT: 173 LBS | DIASTOLIC BLOOD PRESSURE: 76 MMHG | HEART RATE: 65 BPM | HEIGHT: 63 IN | SYSTOLIC BLOOD PRESSURE: 146 MMHG | OXYGEN SATURATION: 98 %

## 2019-08-27 DIAGNOSIS — I10 ESSENTIAL HYPERTENSION: Primary | Chronic | ICD-10-CM

## 2019-08-27 DIAGNOSIS — F41.9 ANXIETY: ICD-10-CM

## 2019-08-27 DIAGNOSIS — F41.8 DEPRESSION WITH ANXIETY: Chronic | ICD-10-CM

## 2019-08-27 PROCEDURE — 99499 UNLISTED E&M SERVICE: CPT | Mod: HCNC,S$GLB,, | Performed by: INTERNAL MEDICINE

## 2019-08-27 PROCEDURE — 3077F PR MOST RECENT SYSTOLIC BLOOD PRESSURE >= 140 MM HG: ICD-10-PCS | Mod: HCNC,CPTII,S$GLB, | Performed by: INTERNAL MEDICINE

## 2019-08-27 PROCEDURE — 1101F PT FALLS ASSESS-DOCD LE1/YR: CPT | Mod: HCNC,CPTII,S$GLB, | Performed by: INTERNAL MEDICINE

## 2019-08-27 PROCEDURE — 99999 PR PBB SHADOW E&M-EST. PATIENT-LVL III: CPT | Mod: PBBFAC,HCNC,, | Performed by: INTERNAL MEDICINE

## 2019-08-27 PROCEDURE — 1101F PR PT FALLS ASSESS DOC 0-1 FALLS W/OUT INJ PAST YR: ICD-10-PCS | Mod: HCNC,CPTII,S$GLB, | Performed by: INTERNAL MEDICINE

## 2019-08-27 PROCEDURE — 3077F SYST BP >= 140 MM HG: CPT | Mod: HCNC,CPTII,S$GLB, | Performed by: INTERNAL MEDICINE

## 2019-08-27 PROCEDURE — 3078F DIAST BP <80 MM HG: CPT | Mod: HCNC,CPTII,S$GLB, | Performed by: INTERNAL MEDICINE

## 2019-08-27 PROCEDURE — 3078F PR MOST RECENT DIASTOLIC BLOOD PRESSURE < 80 MM HG: ICD-10-PCS | Mod: HCNC,CPTII,S$GLB, | Performed by: INTERNAL MEDICINE

## 2019-08-27 PROCEDURE — 99213 PR OFFICE/OUTPT VISIT, EST, LEVL III, 20-29 MIN: ICD-10-PCS | Mod: HCNC,S$GLB,, | Performed by: INTERNAL MEDICINE

## 2019-08-27 PROCEDURE — 99213 OFFICE O/P EST LOW 20 MIN: CPT | Mod: HCNC,S$GLB,, | Performed by: INTERNAL MEDICINE

## 2019-08-27 PROCEDURE — 99999 PR PBB SHADOW E&M-EST. PATIENT-LVL III: ICD-10-PCS | Mod: PBBFAC,HCNC,, | Performed by: INTERNAL MEDICINE

## 2019-08-27 PROCEDURE — 99499 RISK ADDL DX/OHS AUDIT: ICD-10-PCS | Mod: HCNC,S$GLB,, | Performed by: INTERNAL MEDICINE

## 2019-08-27 RX ORDER — ALPRAZOLAM 0.5 MG/1
0.5 TABLET ORAL NIGHTLY PRN
Qty: 30 TABLET | Refills: 1 | Status: SHIPPED | OUTPATIENT
Start: 2019-08-27 | End: 2019-10-15 | Stop reason: SDUPTHER

## 2019-08-27 RX ORDER — METOPROLOL SUCCINATE 25 MG/1
25 TABLET, EXTENDED RELEASE ORAL DAILY
Qty: 90 TABLET | Refills: 3 | Status: SHIPPED | OUTPATIENT
Start: 2019-08-27 | End: 2019-09-23 | Stop reason: SDUPTHER

## 2019-08-27 RX ORDER — SERTRALINE HYDROCHLORIDE 100 MG/1
100 TABLET, FILM COATED ORAL DAILY
Qty: 30 TABLET | Refills: 2 | Status: SHIPPED | OUTPATIENT
Start: 2019-08-27 | End: 2019-11-25 | Stop reason: SDUPTHER

## 2019-08-27 NOTE — PROGRESS NOTES
"INTERNAL MEDICINE    Patient Active Problem List   Diagnosis    Essential hypertension    Hypercholesterolemia    History of pulmonary embolism    Depression with anxiety    Gastroesophageal reflux disease without esophagitis    Pulmonary emphysema    Allergic rhinitis    Stage 3 chronic kidney disease    Encounter for monitoring Coumadin therapy       CC:   Chief Complaint   Patient presents with    Cough    Depression    pt states"I just don't feel right"       SUBJECTIVE:  Alessandra Hidalgo   is a 83 y.o. female  Who comes in because " she just does not feel right", but cannot label or identify what is happening. However, she finally states that she is depressed.  She has had this before, years ago, but she stopped taking her meds,  " just because". She is not suicidal or homicidal.  She is eating, but has noticed that she could sleep for much longer periods of time.  Additionally, she no longer drives, and she lives with her ailing , depending on others to take her for her activities/appointments. And this, she does not like to do.      Depression   Visit Type: follow-up  Patient presents with the following symptoms: depressed mood, excessive worry and weight gain.  Patient is not experiencing: hypersomnia, insomnia and suicidal planning.  Frequency of symptoms: constantly   Severity: interfering with daily activities   Sleep per night: 8 hours  Sleep quality: good         83y/oWF has had a long bout of depression.  She continues to have very little motivation to get out of the house or do anything.  She has been on Wellbutrin 150-300mg with no improvement.  Had been on Lexapro  Also with little improvement.  Prozac did not agree with her.  Zoloft is not working.  Went to her Cardiologist several weeks ago, and everything is " alright".    ROS: Review of Systems   Constitutional: Positive for weight gain.   Psychiatric/Behavioral: Positive for depression. The patient does not have insomnia.  "       Past Medical History:   Diagnosis Date    Anxiety     Hyperlipidemia     Hypertension     Pulmonary embolism        Past Surgical History:   Procedure Laterality Date    APPENDECTOMY      CATARACT EXTRACTION W/ INTRAOCULAR LENS  IMPLANT, BILATERAL      HYSTERECTOMY      Partial     LEFT KNEE REPLACEMENT         Family History   Problem Relation Age of Onset    Cancer Mother     Stroke Daughter     Heart disease Neg Hx        Social History     Tobacco Use    Smoking status: Former Smoker     Packs/day: 0.00     Years: 4.00     Pack years: 0.00    Smokeless tobacco: Former User   Substance Use Topics    Alcohol use: Yes     Alcohol/week: 0.6 oz     Types: 1 Glasses of wine per week    Drug use: No       Social History     Social History Narrative    Lives with her  of 58 years. They have 4 children. She is originally from [place]. She is sedentary. She is a practicing W[employer] and she participates in [employer].        ALLERGIES:   Review of patient's allergies indicates:   Allergen Reactions    Prozac [fluoxetine]      dizziness       MEDS:   Current Outpatient Medications:     ALPRAZolam (XANAX) 0.5 MG tablet, Take 1 tablet (0.5 mg total) by mouth nightly as needed for Anxiety., Disp: 30 tablet, Rfl: 1    dicyclomine (BENTYL) 10 MG capsule, Take 1 capsule (10 mg total) by mouth 3 (three) times daily as needed., Disp: 30 capsule, Rfl: 0    DULoxetine (CYMBALTA) 20 MG capsule, Take 1 capsule (20 mg total) by mouth once daily., Disp: 90 capsule, Rfl: 3    metoprolol succinate (TOPROL-XL) 25 MG 24 hr tablet, Take 1 tablet (25 mg total) by mouth once daily., Disp: 90 tablet, Rfl: 3    omeprazole (PRILOSEC) 40 MG capsule, Take 40 mg by mouth every morning. , Disp: , Rfl: 3    rosuvastatin (CRESTOR) 10 MG tablet, TAKE 1 TABLET BY MOUTH ONCE DAILY FOR CHOLESTEROL., Disp: 90 tablet, Rfl: 3    sertraline (ZOLOFT) 100 MG tablet, Take 1 tablet (100 mg total) by mouth once  "daily., Disp: 30 tablet, Rfl: 2    warfarin (COUMADIN) 4 MG tablet, Take 4 mg by mouth once daily., Disp: , Rfl:     OBJECTIVE:   Vitals:    08/27/19 1332   BP: (!) 146/76   Pulse: 65   SpO2: 98%   Weight: 78.5 kg (173 lb)   Height: 5' 3" (1.6 m)     Body mass index is 30.65 kg/m².    Physical Exam   Constitutional: She is oriented to person, place, and time. She appears well-developed and well-nourished.   HENT:   Head: Normocephalic and atraumatic.   Nose: Nose normal.   Mouth/Throat: Oropharynx is clear and moist.   Eyes: Conjunctivae and EOM are normal.   Neck: Neck supple.   Cardiovascular: Normal rate, regular rhythm and normal heart sounds.   Pulmonary/Chest: Effort normal and breath sounds normal.   Abdominal: Soft. Bowel sounds are normal.   Musculoskeletal:   On a walker   Neurological: She is alert and oriented to person, place, and time.   Skin: Skin is warm and dry.   Psychiatric: She has a normal mood and affect. Her behavior is normal.   Nursing note and vitals reviewed.        PERTINENT RESULTS:   CBC:  No results for input(s): WBC, RBC, HGB, HCT, PLT, MCV, MCH, MCHC in the last 2160 hours.  CMP:  No results for input(s): GLU, CALCIUM, ALBUMIN, PROT, NA, K, CO2, CL, BUN, ALKPHOS, ALT, AST, BILITOT in the last 2160 hours.    Invalid input(s): CREATININ  URINALYSIS:  No results for input(s): COLORU, CLARITYU, SPECGRAV, PHUR, PROTEINUA, GLUCOSEU, BILIRUBINCON, BLOODU, WBCU, RBCU, BACTERIA, MUCUS, NITRITE, LEUKOCYTESUR, UROBILINOGEN, HYALINECASTS in the last 2160 hours.   LIPIDS:  No results for input(s): TSH, HDL, CHOL, TRIG, LDLCALC, CHOLHDL, NONHDLCHOL, TOTALCHOLEST in the last 2160 hours.          ASSESSMENT:  Problem List Items Addressed This Visit        Psychiatric    Depression with anxiety (Chronic)    Relevant Medications    ALPRAZolam (XANAX) 0.5 MG tablet    sertraline (ZOLOFT) 100 MG tablet       Cardiac/Vascular    Essential hypertension - Primary (Chronic)    Relevant Medications    " metoprolol succinate (TOPROL-XL) 25 MG 24 hr tablet      Other Visit Diagnoses     Anxiety        Relevant Medications    ALPRAZolam (XANAX) 0.5 MG tablet      Long discussion about Xanax, which she flatly refuses to stop.  Have encouraged her to go to Psychiatrist, but transportation is a major problem, especially to go to [place].    PLAN:   Orders Placed This Encounter    metoprolol succinate (TOPROL-XL) 25 MG 24 hr tablet    ALPRAZolam (XANAX) 0.5 MG tablet    sertraline (ZOLOFT) 100 MG tablet     No orders of the defined types were placed in this encounter.      Follow-up with me in 6 weeks   Dr. Turner Adams  Internal Medicine

## 2019-09-17 ENCOUNTER — TELEPHONE (OUTPATIENT)
Dept: FAMILY MEDICINE | Facility: CLINIC | Age: 84
End: 2019-09-17

## 2019-09-17 DIAGNOSIS — Z86.711 HISTORY OF PULMONARY EMBOLISM: Primary | Chronic | ICD-10-CM

## 2019-09-17 DIAGNOSIS — Z79.01 CHRONIC ANTICOAGULATION: ICD-10-CM

## 2019-09-17 NOTE — TELEPHONE ENCOUNTER
----- Message from Moira Prabhakar sent at 9/17/2019  4:24 PM CDT -----  Contact: Alessandra Hidalgo  No [phone]     Patient stated she went to the lab, but there were no orders for her to have lab work.

## 2019-09-23 DIAGNOSIS — I10 ESSENTIAL HYPERTENSION: Chronic | ICD-10-CM

## 2019-09-23 RX ORDER — METOPROLOL SUCCINATE 25 MG/1
25 TABLET, EXTENDED RELEASE ORAL DAILY
Qty: 90 TABLET | Refills: 0 | Status: SHIPPED | OUTPATIENT
Start: 2019-09-23 | End: 2019-12-23 | Stop reason: SDUPTHER

## 2019-09-23 NOTE — TELEPHONE ENCOUNTER
----- Message from Amparo Lawrence sent at 9/23/2019  8:59 AM CDT -----  Contact: pt  Type:  RX Refill Request    Who Called:  pt  Refill or New Rx:refill  RX Name and Strength: poprol 25mg and Xanax 0.5 mg  How is the patient currently taking it? (ex. 1XDay):1xday  Is this a 30 day or 90 day RX1 [place]  Preferred Pharmacy with phone number:[facility] [phone]  Local or Mail Order:  Ordering Provider: Dr Collier  Would the patient rather a call back or a response via [facility]?   Best Call Back Number:[phone]  Additional Information:

## 2019-09-30 ENCOUNTER — TELEPHONE (OUTPATIENT)
Dept: INTERNAL MEDICINE | Facility: CLINIC | Age: 84
End: 2019-09-30

## 2019-09-30 DIAGNOSIS — F41.8 DEPRESSION WITH ANXIETY: Chronic | ICD-10-CM

## 2019-09-30 DIAGNOSIS — F41.9 ANXIETY: ICD-10-CM

## 2019-09-30 RX ORDER — ALPRAZOLAM 0.5 MG/1
0.5 TABLET ORAL NIGHTLY PRN
Qty: 30 TABLET | Refills: 1 | OUTPATIENT
Start: 2019-09-30

## 2019-09-30 NOTE — TELEPHONE ENCOUNTER
Called pt informed her that she will need to be seen to get this script. Pt states she will have to call back because she doesn't know when she will be able to come in.

## 2019-09-30 NOTE — TELEPHONE ENCOUNTER
----- Message from Catrachita Arredondo sent at 9/30/2019 11:02 AM CDT -----  Contact: Pt  Pt called to speak to the nurse to request an Rx refill for ALPRAZolam (XANAX) 0.5 MG tablet and would like the Rx sent to [facility] in [place], [place] and would like a call back at [phone]

## 2019-09-30 NOTE — TELEPHONE ENCOUNTER
----- Message from Catrachita Arredondo sent at 9/30/2019 11:02 AM CDT -----  Contact: Pt  Pt called to speak to the nurse to request an Rx refill for ALPRAZolam (XANAX) 0.5 MG tablet and would like the Rx sent to [facility] in [place], [place] and would like a call back at [phone]

## 2019-09-30 NOTE — TELEPHONE ENCOUNTER
Spoke with pt she states she needs her xanax script to help her out with dealing with circumstances in her life. I informed her that Dr. Adams wasn't in the office today and will address this when she comes in.

## 2019-10-02 ENCOUNTER — PES CALL (OUTPATIENT)
Dept: ADMINISTRATIVE | Facility: CLINIC | Age: 84
End: 2019-10-02

## 2019-10-15 DIAGNOSIS — F41.8 DEPRESSION WITH ANXIETY: Chronic | ICD-10-CM

## 2019-10-15 DIAGNOSIS — F41.9 ANXIETY: ICD-10-CM

## 2019-10-15 RX ORDER — ALPRAZOLAM 0.5 MG/1
0.5 TABLET ORAL NIGHTLY PRN
Qty: 30 TABLET | Refills: 1 | Status: SHIPPED | OUTPATIENT
Start: 2019-10-15 | End: 2020-01-24 | Stop reason: SDUPTHER

## 2019-10-15 NOTE — TELEPHONE ENCOUNTER
----- Message from Demetra Nunez sent at 10/15/2019 10:46 AM CDT -----  Contact: [phone]/self  Type:  RX Refill Request    Who Called:  self  Refill or New Rx: refill  RX Name and Strength: ALPRAZolam (XANAX) 0.5 MG tablet  How is the patient currently taking it? (ex. 1XDay): Take 1 tablet (0.5 mg total) by mouth nightly as needed for Anxiety. - Oral  Is this a 30 day or 90 day RX: 30/1 refill  Preferred Pharmacy with phone number: [facility]  [place] - [address], [facility] [address]  Local or Mail Order: local  Ordering Provider:   Would the patient rather a call back or a response via [facility]?  call  Best Call Back Number:   Additional Information:  Patient hurt her knee and cannot come in to office

## 2019-10-18 ENCOUNTER — TELEPHONE (OUTPATIENT)
Dept: FAMILY MEDICINE | Facility: CLINIC | Age: 84
End: 2019-10-18

## 2019-10-18 NOTE — TELEPHONE ENCOUNTER
----- Message from Jimi Tawanda sent at 10/18/2019 11:17 AM CDT -----  Contact: Pt  Pt called and said that her prescription of ALPRAZolam (XANAX) 0.5 MG tablet was supposed to have been sent to her pharmacy but the pharmacy hasn't received it.    Pt can be reached at [phone]

## 2019-10-18 NOTE — TELEPHONE ENCOUNTER
Spoke with patient. Informed patient that prescription was sent to [facility], but notes say that the medication can not be filled until 10/27. Pt verbalizes understanding.

## 2019-10-21 ENCOUNTER — TELEPHONE (OUTPATIENT)
Dept: INTERNAL MEDICINE | Facility: CLINIC | Age: 84
End: 2019-10-21

## 2019-10-21 NOTE — TELEPHONE ENCOUNTER
----- Message from Bailey Reid sent at 10/21/2019  9:20 AM CDT -----  Contact: Pt   Pt needs a refill on ALPRAZolam (XANAX) 0.5 MG tablet  called into pharmacy at [place], [place] - [facility] [address], [address]    Pt is completely out of medication so would like a refill as soon as possible.   Pt can be reached at [phone].

## 2019-10-21 NOTE — TELEPHONE ENCOUNTER
I spoke with patient, her son picked up her written Rx from this office last week. Patient tried to get medication now, It is too soon to fill her Xanax. Patient is due to pick this up on Friday. Patient has been informed and understand. Vf/ma

## 2019-10-28 DIAGNOSIS — E78.5 HYPERLIPIDEMIA, UNSPECIFIED HYPERLIPIDEMIA TYPE: ICD-10-CM

## 2019-10-28 RX ORDER — ROSUVASTATIN CALCIUM 10 MG/1
TABLET, COATED ORAL
Qty: 30 TABLET | Refills: 0 | Status: SHIPPED | OUTPATIENT
Start: 2019-10-28 | End: 2019-11-25 | Stop reason: SDUPTHER

## 2019-10-28 NOTE — TELEPHONE ENCOUNTER
----- Message from Sonia Cadena sent at 10/28/2019  8:32 AM CDT -----  Contact: patient  Pt needs a refill on cholesterol medication called into pharmacy.    Pt can be reached at [phone]    Thanks  KB

## 2019-11-05 DIAGNOSIS — I10 ESSENTIAL HYPERTENSION: Chronic | ICD-10-CM

## 2019-11-05 DIAGNOSIS — N18.30 STAGE 3 CHRONIC KIDNEY DISEASE: ICD-10-CM

## 2019-11-05 DIAGNOSIS — E78.00 HYPERCHOLESTEROLEMIA: Primary | ICD-10-CM

## 2019-11-07 ENCOUNTER — TELEPHONE (OUTPATIENT)
Dept: INTERNAL MEDICINE | Facility: CLINIC | Age: 84
End: 2019-11-07

## 2019-11-07 NOTE — TELEPHONE ENCOUNTER
----- Message from My Freed sent at 11/7/2019 12:41 PM CST -----  Contact: [phone]-self  Patient requesting orders for blood work.

## 2019-11-25 DIAGNOSIS — F41.8 DEPRESSION WITH ANXIETY: Chronic | ICD-10-CM

## 2019-11-25 DIAGNOSIS — E78.5 HYPERLIPIDEMIA, UNSPECIFIED HYPERLIPIDEMIA TYPE: ICD-10-CM

## 2019-11-25 RX ORDER — ROSUVASTATIN CALCIUM 10 MG/1
TABLET, COATED ORAL
Qty: 30 TABLET | Refills: 0 | Status: SHIPPED | OUTPATIENT
Start: 2019-11-25 | End: 2020-01-24 | Stop reason: SDUPTHER

## 2019-11-25 RX ORDER — SERTRALINE HYDROCHLORIDE 100 MG/1
100 TABLET, FILM COATED ORAL DAILY
Qty: 30 TABLET | Refills: 0 | Status: SHIPPED | OUTPATIENT
Start: 2019-11-25 | End: 2020-01-24

## 2019-11-25 NOTE — TELEPHONE ENCOUNTER
----- Message from Jade Mckeon sent at 11/25/2019  9:41 AM CST -----  Contact: [phone]    Pt needs a refill on rosuvastatin (CRESTOR) 10 MG tablet And /sertraline (ZOLOFT) 100 MG tablet  called into pharmacy at [facility] phone [phone]        Thank you!

## 2019-11-25 NOTE — TELEPHONE ENCOUNTER
Spoke with patient and informed that she needs to make a follow up appointment due to be past due. Informed patient I would send to Dr. Adams for approval.

## 2019-12-23 DIAGNOSIS — I10 ESSENTIAL HYPERTENSION: Chronic | ICD-10-CM

## 2019-12-23 RX ORDER — METOPROLOL SUCCINATE 25 MG/1
25 TABLET, EXTENDED RELEASE ORAL DAILY
Qty: 90 TABLET | Refills: 0 | Status: SHIPPED | OUTPATIENT
Start: 2019-12-23 | End: 2020-01-23 | Stop reason: SDUPTHER

## 2019-12-23 NOTE — TELEPHONE ENCOUNTER
----- Message from Amparo Lawrence sent at 12/23/2019  9:16 AM CST -----  Contact: pt  Type:  RX Refill Request    Who Called:  Pt  Refill or New Rx: refill  RX Name and Strength:Toprol 25 mg  How is the patient currently taking it? (ex. 1XDay):1 day  Is this a 30 day or 90 day RX90  Preferred Pharmacy with phone number: [facility] [phone]  Local or Mail Order:  Ordering Provider  Dr Adams  Would the patient rather a call back or a response via [facility]?   Best Call Back Number:[phone]  Additional Information:

## 2019-12-23 NOTE — TELEPHONE ENCOUNTER
----- Message from Amparo Lawrence sent at 12/23/2019  9:16 AM CST -----  Contact: pt  Type:  RX Refill Request    Who Called:  Pt  Refill or New Rx: refill  RX Name and Strength:Toprol 25 mg  How is the patient currently taking it? (ex. 1XDay):1 day  Is this a 30 day or 90 day RX90  Preferred Pharmacy with phone number: [facility] [place] [phone]  Local or Mail Order:  Ordering Provider  Dr Adams  Would the patient rather a call back or a response via [facility]?   Best Call Back Number:[phone]  Additional Information:

## 2019-12-31 ENCOUNTER — TELEPHONE (OUTPATIENT)
Dept: FAMILY MEDICINE | Facility: CLINIC | Age: 84
End: 2019-12-31

## 2019-12-31 DIAGNOSIS — F41.9 ANXIETY: ICD-10-CM

## 2019-12-31 DIAGNOSIS — E78.5 HYPERLIPIDEMIA, UNSPECIFIED HYPERLIPIDEMIA TYPE: ICD-10-CM

## 2019-12-31 DIAGNOSIS — F41.8 DEPRESSION WITH ANXIETY: Chronic | ICD-10-CM

## 2019-12-31 RX ORDER — ROSUVASTATIN CALCIUM 10 MG/1
TABLET, COATED ORAL
Qty: 30 TABLET | Refills: 0 | OUTPATIENT
Start: 2019-12-31

## 2019-12-31 RX ORDER — ALPRAZOLAM 0.5 MG/1
0.5 TABLET ORAL NIGHTLY PRN
Qty: 30 TABLET | Refills: 1 | OUTPATIENT
Start: 2019-12-31

## 2019-12-31 RX ORDER — SERTRALINE HYDROCHLORIDE 100 MG/1
100 TABLET, FILM COATED ORAL DAILY
Qty: 30 TABLET | Refills: 0 | OUTPATIENT
Start: 2019-12-31

## 2019-12-31 NOTE — TELEPHONE ENCOUNTER
----- Message from Gilma Tang sent at 12/31/2019 11:32 AM CST -----  Contact: [phone]/self   Patient is calling to get a refill status for ALPRAZolam (XANAX) 0.5 MG tablet, sertraline (ZOLOFT) 100 MG tablet and rosuvastatin (CRESTOR) 10 MG tablet. [facility]. Please advise.

## 2019-12-31 NOTE — TELEPHONE ENCOUNTER
Spoke to patient, advised that she will need to make an appt to est care with another provider. Offered to schedule an appointment and patient declined at this time says she will call back to schedule.

## 2020-01-02 ENCOUNTER — TELEPHONE (OUTPATIENT)
Dept: INTERNAL MEDICINE | Facility: CLINIC | Age: 85
End: 2020-01-02

## 2020-01-02 NOTE — TELEPHONE ENCOUNTER
----- Message from Jade Mckeon sent at 1/2/2020  9:29 AM CST -----  Pt needs a refill on  omeprazole (PRILOSEC) 40 MG capsule  And omeprazole (PRILOSEC) 40 MG capsule  / rosuvastatin (CRESTOR) 10 MG tablet       Send   [facility] [place] - [address], [address]    Pt can be reached at [phone]  Thank you!

## 2020-01-17 DIAGNOSIS — F41.9 ANXIETY: ICD-10-CM

## 2020-01-17 DIAGNOSIS — F41.8 DEPRESSION WITH ANXIETY: Chronic | ICD-10-CM

## 2020-01-18 RX ORDER — ALPRAZOLAM 0.5 MG/1
TABLET ORAL
Qty: 30 TABLET | Refills: 1 | OUTPATIENT
Start: 2020-01-18

## 2020-01-23 PROBLEM — F41.1 GENERALIZED ANXIETY DISORDER: Status: ACTIVE | Noted: 2020-01-23

## 2020-01-23 PROBLEM — Z51.81 ENCOUNTER FOR MONITORING COUMADIN THERAPY: Status: RESOLVED | Noted: 2019-04-16 | Resolved: 2020-01-23

## 2020-01-23 PROBLEM — F33.42 RECURRENT MAJOR DEPRESSIVE DISORDER, IN FULL REMISSION: Status: ACTIVE | Noted: 2020-01-23

## 2020-01-23 PROBLEM — G47.09 OTHER INSOMNIA: Status: ACTIVE | Noted: 2020-01-23

## 2020-01-23 PROBLEM — Z79.01 ENCOUNTER FOR MONITORING COUMADIN THERAPY: Status: RESOLVED | Noted: 2019-04-16 | Resolved: 2020-01-23

## 2020-01-23 PROBLEM — Z79.01 LONG TERM (CURRENT) USE OF ANTICOAGULANTS: Status: ACTIVE | Noted: 2020-01-23

## 2020-01-23 PROBLEM — F13.20 BENZODIAZEPINE DEPENDENCE: Status: ACTIVE | Noted: 2020-01-23

## 2020-01-23 NOTE — PROGRESS NOTES
FAMILY MEDICINE    Patient Active Problem List   Diagnosis    Essential hypertension    Hypercholesterolemia    History of pulmonary embolism    Gastroesophageal reflux disease without esophagitis    Allergic rhinitis    Stage 3 chronic kidney disease    Long term (current) use of anticoagulants    Recurrent major depressive disorder, in full remission    Generalized anxiety disorder    Benzodiazepine dependence    Paroxysmal atrial fibrillation    Mild cognitive impairment       CC:   Chief Complaint   Patient presents with    Establish Care       HPI: Alessandra Hidalgo is a 84 y.o. female  - with hypertension, hyperlipidemia, history of pulmonary embolism (2016) on OAC Warfarin, emphysema, depression, anxiety on chronic benzodiazepine for sleep, CKD stage 3, pAfib (followed by Dr. [facility]) and GERD presents to establish care. Last PCP Dr. Adams and reports that Dr. Adams was managing her Warfarin    Cardiologist: Dr. [facility]    She lives with her son (Zuhair) and  and presents today with her son, Zuhair. She uses a rollator walker    Son reports difficulty with short term memory that has been present since pulmonary embolism with sudden cardiac arrest 2016 following left knee replacement. He reports that long term seem good but short term not as good. She will often ask him the same question over and over again. He does not assist with medications so unsure if she is taking constantly but she is able to state her medication and how she takes them    1. Hypertension with pAfib    Current medication treatment:   metoprolol succinate (TOPROL-XL) 25 MG 24 hr tablet, Take 1 tablet (25 mg total) by mouth once daily., Disp: 90 tablet, Rfl: 0    Medication side effects: denies  Exercise regimen: none  Dietary treatment: low NA    Home BP cuff: none  How often does patient monitoring BP? NA  Last home BP reading: NA     Last 14 day average of home BP reading: NA    2.  "Depression/Anxiety    Prior treatments: Duloxetine 60 mg daily, Prozac (dizziness)  Side effects of prior treatment: lightheaded, dizziness, not effective    Current treatment:   ALPRAZolam (XANAX) 0.5 MG tablet, Take 1 tablet (0.5 mg total) by mouth nightly as needed for Anxiety., Disp: 30 tablet, Rfl: 1  - has been unable to wean off of medication  sertraline (ZOLOFT) 100 MG tablet, Take 1 tablet (100 mg total) by mouth once daily., Disp: 30 tablet, Rfl: 0  - feels that it is not effective and would like to lower dose    Side effects of current treatment: denies    Symptoms related: "I just don't want to do anything." Reports feelings of panic in the AM and often will take her Xanax in the AM  Panic attacks: yes    Hopelessness: denies  Sleep: no issues and reports that sleep is "good"  Suicidal thoughts: denies  Thoughts of self harm:denies  Thoughts of harm to others: denies  History of suicide attempts: denies  Family history of suicide:denies    Support system: family  Counselor: none          HEALTH MAINTENANCE:   Health Maintenance   Topic Date Due    TETANUS VACCINE  11/23/1953    Pneumococcal Vaccine (65+ High/Highest Risk) (1 of 2 - PCV13) 11/23/2002    DEXA SCAN  03/06/2018    Lipid Panel  11/18/2024       ROS: Review of Systems   Constitutional: Negative.    HENT: Negative.    Eyes: Negative.    Respiratory: Negative.    Cardiovascular: Negative.    Gastrointestinal: Negative.    Endocrine: Negative.    Genitourinary: Negative.    Musculoskeletal: Positive for arthralgias.        Otherwise negative   Skin: Negative.    Allergic/Immunologic: Negative.    Neurological: Negative.    Hematological: Negative.    Psychiatric/Behavioral: Positive for confusion. Negative for decreased concentration and sleep disturbance. The patient is nervous/anxious.         Otherwise negative       ALLERGIES:   Review of patient's allergies indicates:   Allergen Reactions    Prozac [fluoxetine]      dizziness "       MEDS:     Current Outpatient Medications:     ALPRAZolam (XANAX) 0.5 MG tablet, Take 1 tablet (0.5 mg total) by mouth nightly as needed for Anxiety., Disp: 30 tablet, Rfl: 2    rosuvastatin (CRESTOR) 10 MG tablet, TAKE 1 TABLET BY MOUTH ONCE DAILY FOR CHOLESTEROL., Disp: 90 tablet, Rfl: 3    warfarin (COUMADIN) 4 MG tablet, Take 4 mg by mouth once daily., Disp: , Rfl:     DIPH,PERTUSS,ACEL,,TET VAC,PF, ADULT (ADACEL) 2 Lf-(2.5-5-3-5 mcg)-5Lf/0.5 mL Syrg, Inject 0.5 mLs into the muscle once. for 1 dose, Disp: 0.5 mL, Rfl: 0    donepezil (ARICEPT) 5 MG tablet, Take 1 tablet (5 mg total) by mouth every evening., Disp: 30 tablet, Rfl: 2    metoprolol succinate (TOPROL-XL) 25 MG 24 hr tablet, Take 1 tablet (25 mg total) by mouth once daily., Disp: 90 tablet, Rfl: 1    sertraline (ZOLOFT) 50 MG tablet, Take 1 tablet (50 mg total) by mouth once daily., Disp: 90 tablet, Rfl: 1    varicella-zoster gE-AS01B, PF, (SHINGRIX, PF,) 50 mcg/0.5 mL injection, Inject 0.5mL IM at 0 and 2-6 months, Disp: 0.5 mL, Rfl: 1    Past Medical History:   Diagnosis Date    Anxiety     Hyperlipidemia     Hypertension     Pulmonary embolism        Past Surgical History:   Procedure Laterality Date    APPENDECTOMY      CATARACT EXTRACTION W/ INTRAOCULAR LENS  IMPLANT, BILATERAL      HYSTERECTOMY      Partial     LEFT KNEE REPLACEMENT         Family History   Problem Relation Age of Onset    Cancer Mother     Stroke Daughter     Heart disease Neg Hx        Social History     Tobacco Use    Smoking status: Former Smoker     Packs/day: 0.00     Years: 4.00     Pack years: 0.00     Types: Cigarettes    Smokeless tobacco: Former User   Substance Use Topics    Alcohol use: Yes     Alcohol/week: 1.0 standard drinks     Types: 1 Glasses of wine per week    Drug use: No       Social History     Social History Narrative    Lives with her  and son Zuhair moved in 2019 to assist with care. They have 4 children. She is  "originally from [place]. She is sedentary. She is a practicing [employer] and she participates in [employer]. DME: rollator walker. Tub bench       OBJECTIVE:   Vitals:    01/24/20 0813   BP: 130/82   BP Location: Left arm   Patient Position: Sitting   BP Method: X-Large (Manual)   Pulse: 66   Resp: 18   Temp: 98.3 °F (36.8 °C)   TempSrc: Oral   SpO2: 96%   Weight: 79.2 kg (174 lb 8 oz)   Height: 5' 3" (1.6 m)     Body mass index is 30.91 kg/m².    Physical Exam   Constitutional: No distress.   HENT:   Head: Normocephalic and atraumatic.   Eyes: Conjunctivae are normal. No scleral icterus.   Neck: Neck supple.   Cardiovascular: Normal rate, regular rhythm, normal heart sounds and intact distal pulses. Exam reveals no gallop and no friction rub.   No murmur heard.  Pulmonary/Chest: Effort normal and breath sounds normal. She has no decreased breath sounds. She has no wheezes. She has no rhonchi. She has no rales.   Abdominal: Soft. Normal appearance and bowel sounds are normal.   Musculoskeletal: She exhibits no edema.   Neurological: She is alert.   Skin: Skin is warm. Capillary refill takes less than 2 seconds.         Depression Patient Health Questionnaire 1/24/2020 8/27/2019 1/25/2019 4/11/2018   Over the last two weeks how often have you been bothered by little interest or pleasure in doing things 1 2 3 0   Over the last two weeks how often have you been bothered by feeling down, depressed or hopeless 1 1 3 0   PHQ-2 Total Score 2 3 6 0   Over the last two weeks how often have you been bothered by trouble falling or staying asleep, or sleeping too much - - 0 -   Over the last two weeks how often have you been bothered by feeling tired or having little energy - - 3 -   Over the last two weeks how often have you been bothered by a poor appetite or overeating - - 0 -   Over the last two weeks how often have you been bothered by feeling bad about yourself - or that you are a failure or have let yourself " "or your family down - - 0 -   Over the last two weeks how often have you been bothered by trouble concentrating on things, such as reading the newspaper or watching television - - 0 -   Over the last two weeks how often have you been bothered by moving or speaking so slowly that other people could have noticed. Or the opposite - being so fidgety or restless that you have been moving around a lot more than usual. - - 0 -   Over the last two weeks how often have you been bothered by thoughts that you would be better off dead, or of hurting yourself - - 0 -   If you checked off any problems, how difficult have these problems made it for you to do your work, take care of things at home or get along with other people? - - Very difficult -   Total Score - - 9 -     MMSE 1/24/2020   What is the (year), (season), (date), (day), (month)? 4   Where are we (state), (country), (town or city), (hospital), (floor)? 3   Name 3 common objects (eg. "apple", "table", "huey"). Take 1 second to say each. Then ask the patient to repeat all 3. Give 1 point for each correct answer. Then repeat them until he/she learns all 3. Count trials and record. 3   Serial 7's backwards. Stop after 5 answers. (100,93,86,79,72) or alternatively  spell "WORLD" backwards. (D..L..R..O..W). The score is the number of letters in correct order. 5   Ask for the 3 common objects named earlier in the exam. Give 1 point for each correct answer. 1   Name a "pencil" and "watch." 2   Repeat the following: "No ifs, ands, or buts." 0   Follow a 3-stage command: "Take a paper in your right hand, fold it in half, & put it on the floor." 3   Read and obey the following: (see paper exam) 1   Write a sentence. 1   Copy the following design: (see paper exam) 0   Total MMSE Score 23   Some recent data might be hidden           PERTINENT RESULTS:   BMP  Lab Results   Component Value Date     11/18/2019    K 4.7 11/18/2019     11/18/2019    CO2 30 (H) 11/18/2019 "    BUN 23 (H) 11/18/2019    CREATININE 1.09 11/18/2019    CALCIUM 9.8 11/18/2019    ANIONGAP 7 (L) 11/18/2019    ESTGFRAFRICA 55.0 (A) 11/18/2019    EGFRNONAA 47.7 (A) 11/18/2019     Lab Results   Component Value Date    ALT 17 11/18/2019    AST 27 11/18/2019    ALKPHOS 113 11/18/2019    BILITOT 0.4 11/18/2019     Lab Results   Component Value Date    CHOL 193 11/18/2019    CHOL 187 03/20/2018    CHOL 193 10/27/2016     Lab Results   Component Value Date    HDL 55 11/18/2019    HDL 47 03/20/2018    HDL 50 10/27/2016     Lab Results   Component Value Date    LDLCALC 109.6 11/18/2019    LDLCALC 101.0 03/20/2018    LDLCALC 106.6 10/27/2016     Lab Results   Component Value Date    TRIG 142 11/18/2019    TRIG 195 (H) 03/20/2018    TRIG 182 (H) 10/27/2016     Lab Results   Component Value Date    CHOLHDL 28.5 11/18/2019    CHOLHDL 25.1 03/20/2018    CHOLHDL 25.9 10/27/2016     Lab Results   Component Value Date    WBC 7.85 11/18/2019    HGB 12.8 11/18/2019    HCT 39.9 11/18/2019    MCV 93 11/18/2019     11/18/2019         ASSESSMENT/PLAN:  Problem List Items Addressed This Visit        Neuro    Mild cognitive impairment    Overview     - 1/24/2020 MMSE 23         Current Assessment & Plan     - discussed concerns for mild dementia with pt and son  - anticipatory guidance  - supportive care  - okay to trial Aricept 5 mg nightly         Relevant Medications    donepezil (ARICEPT) 5 MG tablet       Psychiatric    Recurrent major depressive disorder, in full remission    Current Assessment & Plan     - well controlled  - reduce Zoloft from 100 mg to 50 mg daily  - monitor         Relevant Medications    ALPRAZolam (XANAX) 0.5 MG tablet    sertraline (ZOLOFT) 50 MG tablet    Generalized anxiety disorder    Current Assessment & Plan     - with benzodiazepine dependence  - reports little change on zoloft and she would like to reduce dose  - reduce zoloft from 100 mg to 50 mg daily         Relevant Medications     ALPRAZolam (XANAX) 0.5 MG tablet    Benzodiazepine dependence    Current Assessment & Plan     - counseling on safe use of medication  - discussed attempting to reduce use with goal to stop medication   - son agreeable with plan  - no falls or safety issues at this time   -  reviewed            Cardiac/Vascular    Essential hypertension - Primary (Chronic)    Current Assessment & Plan     - well controlled  - continue current medications         Relevant Medications    metoprolol succinate (TOPROL-XL) 25 MG 24 hr tablet    Hypercholesterolemia    Current Assessment & Plan     Lab Results   Component Value Date    LDLCALC 109.6 11/18/2019     - LDL goal <70  - on Rosuvastatin 10 mg daily  - monitor         Relevant Medications    rosuvastatin (CRESTOR) 10 MG tablet    Paroxysmal atrial fibrillation    Overview     - followed by Dr. [facility]  - OAC warfarin  - rate controlled with BB            Renal/    Stage 3 chronic kidney disease    Current Assessment & Plan     - stable  - monitor            Hematology    History of pulmonary embolism (Chronic)    Overview     - 2016 s/p knee surgery with cardiac arrest  - on OAC Warfarin          Relevant Orders    Ambulatory referral to Anticoagulation Monitoring    Long term (current) use of anticoagulants    Overview     - OAC Warfarin for pAFib and history of pulmonary embolism         Relevant Orders    Ambulatory referral to Anticoagulation Monitoring    Protime-INR (Completed)      Other Visit Diagnoses     Postmenopausal        Relevant Orders    DXA Bone Density Spine And Hip          ORDERS:   Orders Placed This Encounter    DXA Bone Density Spine And Hip    Influenza - High Dose (65+) (PF) (IM)    Pneumococcal Conjugate Vaccine (13 Valent) (IM)    Protime-INR    Ambulatory referral to Anticoagulation Monitoring    metoprolol succinate (TOPROL-XL) 25 MG 24 hr tablet    DIPH,PERTUSS,ACEL,,TET VAC,PF, ADULT (ADACEL) 2 Lf-(2.5-5-3-5 mcg)-5Lf/0.5  mL Syrg    varicella-zoster gE-AS01B, PF, (SHINGRIX, PF,) 50 mcg/0.5 mL injection    rosuvastatin (CRESTOR) 10 MG tablet    ALPRAZolam (XANAX) 0.5 MG tablet    sertraline (ZOLOFT) 50 MG tablet    donepezil (ARICEPT) 5 MG tablet     Vaccines recommended: Tdap, Shingles, PCV 13 and PPSV 23 and flu    Follow-up in 3 months.     Dr. Milli Rey D.O.   [facility]

## 2020-01-24 ENCOUNTER — OFFICE VISIT (OUTPATIENT)
Dept: FAMILY MEDICINE | Facility: CLINIC | Age: 85
End: 2020-01-24
Payer: MEDICARE

## 2020-01-24 ENCOUNTER — ANTI-COAG VISIT (OUTPATIENT)
Dept: CARDIOLOGY | Facility: CLINIC | Age: 85
End: 2020-01-24
Payer: MEDICARE

## 2020-01-24 ENCOUNTER — TELEPHONE (OUTPATIENT)
Dept: FAMILY MEDICINE | Facility: CLINIC | Age: 85
End: 2020-01-24

## 2020-01-24 VITALS
TEMPERATURE: 98 F | RESPIRATION RATE: 18 BRPM | BODY MASS INDEX: 30.92 KG/M2 | WEIGHT: 174.5 LBS | DIASTOLIC BLOOD PRESSURE: 82 MMHG | OXYGEN SATURATION: 96 % | HEART RATE: 66 BPM | HEIGHT: 63 IN | SYSTOLIC BLOOD PRESSURE: 130 MMHG

## 2020-01-24 DIAGNOSIS — N18.30 STAGE 3 CHRONIC KIDNEY DISEASE: ICD-10-CM

## 2020-01-24 DIAGNOSIS — Z79.01 LONG TERM (CURRENT) USE OF ANTICOAGULANTS: ICD-10-CM

## 2020-01-24 DIAGNOSIS — Z86.711 HISTORY OF PULMONARY EMBOLISM: Chronic | ICD-10-CM

## 2020-01-24 DIAGNOSIS — F13.20 BENZODIAZEPINE DEPENDENCE: ICD-10-CM

## 2020-01-24 DIAGNOSIS — I48.0 PAROXYSMAL ATRIAL FIBRILLATION: ICD-10-CM

## 2020-01-24 DIAGNOSIS — F41.1 GENERALIZED ANXIETY DISORDER: ICD-10-CM

## 2020-01-24 DIAGNOSIS — G31.84 MILD COGNITIVE IMPAIRMENT: ICD-10-CM

## 2020-01-24 DIAGNOSIS — Z78.0 POSTMENOPAUSAL: ICD-10-CM

## 2020-01-24 DIAGNOSIS — E78.00 HYPERCHOLESTEROLEMIA: ICD-10-CM

## 2020-01-24 DIAGNOSIS — I10 ESSENTIAL HYPERTENSION: Primary | Chronic | ICD-10-CM

## 2020-01-24 DIAGNOSIS — F33.42 RECURRENT MAJOR DEPRESSIVE DISORDER, IN FULL REMISSION: ICD-10-CM

## 2020-01-24 DIAGNOSIS — Z86.711 HISTORY OF PULMONARY EMBOLISM: ICD-10-CM

## 2020-01-24 PROCEDURE — 3079F DIAST BP 80-89 MM HG: CPT | Mod: CPTII,S$GLB,, | Performed by: FAMILY MEDICINE

## 2020-01-24 PROCEDURE — 99999 PR PBB SHADOW E&M-EST. PATIENT-LVL V: CPT | Mod: PBBFAC,,, | Performed by: FAMILY MEDICINE

## 2020-01-24 PROCEDURE — 1125F PR PAIN SEVERITY QUANTIFIED, PAIN PRESENT: ICD-10-PCS | Mod: S$GLB,,, | Performed by: FAMILY MEDICINE

## 2020-01-24 PROCEDURE — 99214 PR OFFICE/OUTPT VISIT, EST, LEVL IV, 30-39 MIN: ICD-10-PCS | Mod: 25,S$GLB,, | Performed by: FAMILY MEDICINE

## 2020-01-24 PROCEDURE — 1100F PTFALLS ASSESS-DOCD GE2>/YR: CPT | Mod: CPTII,S$GLB,, | Performed by: FAMILY MEDICINE

## 2020-01-24 PROCEDURE — 3075F PR MOST RECENT SYSTOLIC BLOOD PRESS GE 130-139MM HG: ICD-10-PCS | Mod: CPTII,S$GLB,, | Performed by: FAMILY MEDICINE

## 2020-01-24 PROCEDURE — 90670 PCV13 VACCINE IM: CPT | Mod: S$GLB,,, | Performed by: FAMILY MEDICINE

## 2020-01-24 PROCEDURE — 99999 PR PBB SHADOW E&M-EST. PATIENT-LVL V: ICD-10-PCS | Mod: PBBFAC,,, | Performed by: FAMILY MEDICINE

## 2020-01-24 PROCEDURE — 3075F SYST BP GE 130 - 139MM HG: CPT | Mod: CPTII,S$GLB,, | Performed by: FAMILY MEDICINE

## 2020-01-24 PROCEDURE — 90662 IIV NO PRSV INCREASED AG IM: CPT | Mod: S$GLB,,, | Performed by: FAMILY MEDICINE

## 2020-01-24 PROCEDURE — 1159F PR MEDICATION LIST DOCUMENTED IN MEDICAL RECORD: ICD-10-PCS | Mod: S$GLB,,, | Performed by: FAMILY MEDICINE

## 2020-01-24 PROCEDURE — G0008 ADMIN INFLUENZA VIRUS VAC: HCPCS | Mod: S$GLB,,, | Performed by: FAMILY MEDICINE

## 2020-01-24 PROCEDURE — 3288F PR FALLS RISK ASSESSMENT DOCUMENTED: ICD-10-PCS | Mod: CPTII,S$GLB,, | Performed by: FAMILY MEDICINE

## 2020-01-24 PROCEDURE — 90670 PNEUMOCOCCAL CONJUGATE VACCINE 13-VALENT LESS THAN 5YO & GREATER THAN: ICD-10-PCS | Mod: S$GLB,,, | Performed by: FAMILY MEDICINE

## 2020-01-24 PROCEDURE — 99499 UNLISTED E&M SERVICE: CPT | Mod: HCNC,S$GLB,, | Performed by: FAMILY MEDICINE

## 2020-01-24 PROCEDURE — 1100F PR PT FALLS ASSESS DOC 2+ FALLS/FALL W/INJURY/YR: ICD-10-PCS | Mod: CPTII,S$GLB,, | Performed by: FAMILY MEDICINE

## 2020-01-24 PROCEDURE — G0009 PNEUMOCOCCAL CONJUGATE VACCINE 13-VALENT LESS THAN 5YO & GREATER THAN: ICD-10-PCS | Mod: S$GLB,,, | Performed by: FAMILY MEDICINE

## 2020-01-24 PROCEDURE — 99499 RISK ADDL DX/OHS AUDIT: ICD-10-PCS | Mod: HCNC,S$GLB,, | Performed by: FAMILY MEDICINE

## 2020-01-24 PROCEDURE — 90662 FLU VACCINE - HIGH DOSE (65+) PRESERVATIVE FREE IM: ICD-10-PCS | Mod: S$GLB,,, | Performed by: FAMILY MEDICINE

## 2020-01-24 PROCEDURE — G0008 FLU VACCINE - HIGH DOSE (65+) PRESERVATIVE FREE IM: ICD-10-PCS | Mod: S$GLB,,, | Performed by: FAMILY MEDICINE

## 2020-01-24 PROCEDURE — 3288F FALL RISK ASSESSMENT DOCD: CPT | Mod: CPTII,S$GLB,, | Performed by: FAMILY MEDICINE

## 2020-01-24 PROCEDURE — 1159F MED LIST DOCD IN RCRD: CPT | Mod: S$GLB,,, | Performed by: FAMILY MEDICINE

## 2020-01-24 PROCEDURE — G0009 ADMIN PNEUMOCOCCAL VACCINE: HCPCS | Mod: S$GLB,,, | Performed by: FAMILY MEDICINE

## 2020-01-24 PROCEDURE — 1125F AMNT PAIN NOTED PAIN PRSNT: CPT | Mod: S$GLB,,, | Performed by: FAMILY MEDICINE

## 2020-01-24 PROCEDURE — 3079F PR MOST RECENT DIASTOLIC BLOOD PRESSURE 80-89 MM HG: ICD-10-PCS | Mod: CPTII,S$GLB,, | Performed by: FAMILY MEDICINE

## 2020-01-24 PROCEDURE — 99214 OFFICE O/P EST MOD 30 MIN: CPT | Mod: 25,S$GLB,, | Performed by: FAMILY MEDICINE

## 2020-01-24 RX ORDER — DONEPEZIL HYDROCHLORIDE 5 MG/1
5 TABLET, FILM COATED ORAL NIGHTLY
Qty: 30 TABLET | Refills: 2 | Status: SHIPPED | OUTPATIENT
Start: 2020-01-24 | End: 2020-06-22 | Stop reason: SDUPTHER

## 2020-01-24 RX ORDER — METOPROLOL SUCCINATE 25 MG/1
25 TABLET, EXTENDED RELEASE ORAL DAILY
Qty: 90 TABLET | Refills: 1 | Status: SHIPPED | OUTPATIENT
Start: 2020-01-24 | End: 2020-06-22 | Stop reason: SDUPTHER

## 2020-01-24 RX ORDER — SERTRALINE HYDROCHLORIDE 50 MG/1
50 TABLET, FILM COATED ORAL DAILY
Qty: 90 TABLET | Refills: 1 | Status: SHIPPED | OUTPATIENT
Start: 2020-01-24 | End: 2020-06-22 | Stop reason: SDUPTHER

## 2020-01-24 RX ORDER — ALPRAZOLAM 0.5 MG/1
0.5 TABLET ORAL NIGHTLY PRN
Qty: 30 TABLET | Refills: 2 | Status: SHIPPED | OUTPATIENT
Start: 2020-01-24 | End: 2020-02-26 | Stop reason: SDUPTHER

## 2020-01-24 RX ORDER — ROSUVASTATIN CALCIUM 10 MG/1
TABLET, COATED ORAL
Qty: 90 TABLET | Refills: 3 | Status: SHIPPED | OUTPATIENT
Start: 2020-01-24 | End: 2020-06-22 | Stop reason: SDUPTHER

## 2020-01-24 NOTE — ASSESSMENT & PLAN NOTE
- discussed concerns for mild dementia with pt and son  - anticipatory guidance  - supportive care  - okay to trial Aricept 5 mg nightly

## 2020-01-24 NOTE — ASSESSMENT & PLAN NOTE
Lab Results   Component Value Date    LDLCALC 109.6 11/18/2019     - LDL goal <70  - on Rosuvastatin 10 mg daily  - monitor

## 2020-01-24 NOTE — ASSESSMENT & PLAN NOTE
- with benzodiazepine dependence  - reports little change on zoloft and she would like to reduce dose  - reduce zoloft from 100 mg to 50 mg daily

## 2020-01-24 NOTE — ASSESSMENT & PLAN NOTE
- counseling on safe use of medication  - discussed attempting to reduce use with goal to stop medication   - son agreeable with plan  - no falls or safety issues at this time   -  reviewed

## 2020-01-24 NOTE — PATIENT INSTRUCTIONS
1. I recommend the following vaccines that are given by your pharmacist:   - Tetanus and  Shingles  - please ask for vaccine the next time your are at your pharmacy  2. New medication for memory: donepazil (aricept) 5 mg   - start nightly

## 2020-01-24 NOTE — PROGRESS NOTES
84 year old female referred after OV with Dr Rey and they sent patient to lab for INR which resulted 2.2 today. Patient's son Zuhair reports patient's dosage of Coumadin for years has been 4 mg 1 tab daily.  I instructed him for patient to take Coumadin 1 tab daily after 5 pm and clinic education appointment on 1/28/20.  Chart routed to pharmacist to review.    1/27/20  Leticia changed clinic education appointment to 1/30/20 per patient's request.  I instructed her son Zuhair of date/time change to appointment which he verbalized understanding..        PMHX - Afib, PE 2016 while on Coumadin, HTN, HLD, CKD, cognitive impairment

## 2020-01-24 NOTE — PROGRESS NOTES
Son states he will check his schedule and then call to schedule follow up appointments and referrals

## 2020-01-24 NOTE — TELEPHONE ENCOUNTER
----- Message from Milli Rey DO sent at 1/24/2020 11:41 AM CST -----  Please call pt INR 2.2 at goal. Continue Warfarin 4 mg nightly. She will be contacted by the [facility] to monitor her INR regualarly

## 2020-01-27 ENCOUNTER — TELEPHONE (OUTPATIENT)
Dept: FAMILY MEDICINE | Facility: CLINIC | Age: 85
End: 2020-01-27

## 2020-01-30 ENCOUNTER — ANTI-COAG VISIT (OUTPATIENT)
Dept: CARDIOLOGY | Facility: CLINIC | Age: 85
End: 2020-01-30
Payer: MEDICARE

## 2020-01-30 DIAGNOSIS — Z86.711 HISTORY OF PULMONARY EMBOLISM: ICD-10-CM

## 2020-01-30 DIAGNOSIS — Z79.01 LONG TERM (CURRENT) USE OF ANTICOAGULANTS: Primary | ICD-10-CM

## 2020-01-30 DIAGNOSIS — I48.0 PAROXYSMAL ATRIAL FIBRILLATION: ICD-10-CM

## 2020-01-30 LAB — INR PPP: 2.9 (ref 2–3)

## 2020-01-30 PROCEDURE — 85610 POCT INR: ICD-10-PCS | Mod: QW,S$GLB,, | Performed by: INTERNAL MEDICINE

## 2020-01-30 PROCEDURE — 93793 ANTICOAG MGMT PT WARFARIN: CPT | Mod: S$GLB,,,

## 2020-01-30 PROCEDURE — 93793 PR ANTICOAGULANT MGMT FOR PT TAKING WARFARIN: ICD-10-PCS | Mod: S$GLB,,,

## 2020-01-30 PROCEDURE — 85610 PROTHROMBIN TIME: CPT | Mod: QW,S$GLB,, | Performed by: INTERNAL MEDICINE

## 2020-01-30 NOTE — PROGRESS NOTES
INR at goal. Medications and chart reviewed. No changes noted to necessitate adjustment of warfarin or follow-up plan. See calendar.  Findings: Pt states she has been taking the same dose for the last 4-5 years; she continues to have some pain in back & denies any other changes.  Will maintain current regimen & re-assess in in 3 weeks.  Can extend pt's visits if she remains WNL.    A full discussion of the nature of anticoagulants has been carried out.  A benefit risk analysis has been presented to the patient, so that they understand the justification for choosing anticoagulation at this time. The need for frequent and regular monitoring, precise dosage adjustment and compliance is stressed.  Side effects of potential bleeding are discussed.  The patient should avoid any OTC items containing aspirin or ibuprofen, and should avoid great swings in general diet.  Avoid alcohol consumption.  Call if any signs of abnormal bleeding.  Patient was educated on situations that would require placing a call to the Coumadin Clinic, including bleeding or unusual bruising issues, changes in health, diet or medications,upcoming procedures that require warfarin interruption, and missed Coumadin dose(s). Patient expressed understanding that avoidance of consistency with these parameters could cause fluctuations in INR, leading to more frequent visits and increase risk of adverse events.

## 2020-02-13 ENCOUNTER — PATIENT OUTREACH (OUTPATIENT)
Dept: ADMINISTRATIVE | Facility: HOSPITAL | Age: 85
End: 2020-02-13

## 2020-02-17 DIAGNOSIS — I48.0 PAROXYSMAL ATRIAL FIBRILLATION: Primary | ICD-10-CM

## 2020-02-17 DIAGNOSIS — Z79.01 LONG TERM (CURRENT) USE OF ANTICOAGULANTS: ICD-10-CM

## 2020-02-17 RX ORDER — WARFARIN 4 MG/1
4 TABLET ORAL DAILY
Qty: 90 TABLET | Refills: 1 | Status: SHIPPED | OUTPATIENT
Start: 2020-02-17 | End: 2020-06-22 | Stop reason: SDUPTHER

## 2020-02-20 ENCOUNTER — ANTI-COAG VISIT (OUTPATIENT)
Dept: CARDIOLOGY | Facility: CLINIC | Age: 85
End: 2020-02-20
Payer: MEDICARE

## 2020-02-20 DIAGNOSIS — I48.0 PAROXYSMAL ATRIAL FIBRILLATION: ICD-10-CM

## 2020-02-20 DIAGNOSIS — Z86.711 HISTORY OF PULMONARY EMBOLISM: ICD-10-CM

## 2020-02-20 DIAGNOSIS — Z79.01 LONG TERM (CURRENT) USE OF ANTICOAGULANTS: ICD-10-CM

## 2020-02-20 PROCEDURE — 93793 ANTICOAG MGMT PT WARFARIN: CPT | Mod: S$GLB,,,

## 2020-02-20 PROCEDURE — 93793 PR ANTICOAGULANT MGMT FOR PT TAKING WARFARIN: ICD-10-PCS | Mod: S$GLB,,,

## 2020-02-26 NOTE — PROGRESS NOTES
FAMILY MEDICINE    Patient Active Problem List   Diagnosis    Essential hypertension    Hypercholesterolemia    History of pulmonary embolism    Gastroesophageal reflux disease without esophagitis    Allergic rhinitis    Stage 3 chronic kidney disease    Long term (current) use of anticoagulants    Recurrent major depressive disorder, in full remission    Generalized anxiety disorder    Benzodiazepine dependence    Paroxysmal atrial fibrillation    Mild cognitive impairment    Chronic pain of both knees       CC:   Chief Complaint   Patient presents with    Follow-up     anxiety    Hypertension       SUBJECTIVE:  Alessandra Hidalgo   is a 84 y.o. female  - with hypertension, hyperlipidemia, history of pulmonary embolism (2016) on OAC Warfarin, emphysema, depression, anxiety on chronic benzodiazepine for sleep, CKD stage 3, pAfib (followed by Dr. [facility]), mild cognitive impairment,chronic knee pain with history of knee replacement and GERD presents to follow-up     Cardiologist: Dr[facility]  [facility]     She lives with her son (Zuhair) and  and presents today with her son, Zuhair. She uses a rollator walker    1. Hypertension with pAfib     Current medication treatment:   metoprolol succinate (TOPROL-XL) 25 MG 24 hr tablet, Take 1 tablet (25 mg total) by mouth once daily., Disp: 90 tablet, Rfl: 0     Medication side effects: denies  Exercise regimen: none  Dietary treatment: low NA     Home BP cuff: none  How often does patient monitoring BP? NA  Last home BP reading: NA     Last 14 day average of home BP reading: NA     2. Depression and Anxiety  - mild cognitive impairment 1/24/2020 MMSE 23/30     Prior treatments: Duloxetine 60 mg daily, Prozac (dizziness)  Side effects of prior treatment: lightheaded, dizziness, not effective     Current treatment:   ALPRAZolam (XANAX) 0.5 MG tablet, Take 1 tablet (0.5 mg total) by mouth nightly as needed for Anxiety., Disp:  "30 tablet, Rfl: 2  - has been unable to wean off of medication  donepezil (ARICEPT) 5 MG tablet, Take 1 tablet (5 mg total) by mouth every evening., Disp: 30 tablet, Rfl: 2  - no taking because made her unable to sleep  sertraline (ZOLOFT) 50 MG tablet, Take 1 tablet (50 mg total) by mouth once daily., Disp: 90 tablet, Rfl: 1    Side effects of current treatment: denies     Symptoms related: reports well controlled but still taking Alprazolam 0.5 mg around lunch time "when I think that I need it"  Panic attacks: yes     Hopelessness: denies  Sleep: no issues and reports that sleep is "good"  Suicidal thoughts: denies  Thoughts of self harm:denies  Thoughts of harm to others: denies  History of suicide attempts: denies  Family history of suicide:denies     Support system: family  Counselor: none    3. Mild cognitive impairment  - Son reports difficulty with short term memory that has been present since pulmonary embolism with sudden cardiac arrest 2016 following left knee replacement. He reports that long term seem good but short term not as good. She will often ask him the same question over and over again. He does not assist with medications so unsure if she is taking constantly but she is able to state her medication and how she takes them     Age diagnosed: 83 yo  Neurology or Neuropsychiatry evaluation: none     Last MMSE: 1/24/2020 23/30    Current symptoms: short term memory loss without behavioral disturbances or sleep issues  Behavioral disturbanced: none  Sleep: none    Current medication regimen:   ALPRAZolam (XANAX) 0.5 MG tablet, Take 1 tablet (0.5 mg total) by mouth nightly as needed for Anxiety., Disp: 30 tablet, Rfl: 2  donepezil (ARICEPT) 5 MG tablet, Take 1 tablet (5 mg total) by mouth every evening., Disp: 30 tablet, Rfl: 2  sertraline (ZOLOFT) 50 MG tablet, Take 1 tablet (50 mg total) by mouth once daily., Disp: 90 tablet, Rfl: 1     Living situation: son Stan) lives with her and her  " and assists in care  DME: rollator      ROS: Review of Systems   Constitutional: Negative.    HENT: Negative.    Eyes: Negative.    Respiratory: Negative.    Cardiovascular: Negative.    Gastrointestinal: Negative.    Endocrine: Negative.    Genitourinary: Negative.    Musculoskeletal: Positive for arthralgias.        Otherwise negative   Skin: Negative.    Allergic/Immunologic: Negative.    Neurological: Negative.    Hematological: Negative.    Psychiatric/Behavioral: Positive for confusion. Negative for agitation, behavioral problems, sleep disturbance and suicidal ideas. The patient is nervous/anxious.         Otherwise negative     Depression Patient Health Questionnaire 2/27/2020 1/24/2020 8/27/2019 1/25/2019 4/11/2018   Over the last two weeks how often have you been bothered by little interest or pleasure in doing things 1 1 2 3 0   Over the last two weeks how often have you been bothered by feeling down, depressed or hopeless 1 1 1 3 0   PHQ-2 Total Score 2 2 3 6 0   Over the last two weeks how often have you been bothered by trouble falling or staying asleep, or sleeping too much - - - 0 -   Over the last two weeks how often have you been bothered by feeling tired or having little energy - - - 3 -   Over the last two weeks how often have you been bothered by a poor appetite or overeating - - - 0 -   Over the last two weeks how often have you been bothered by feeling bad about yourself - or that you are a failure or have let yourself or your family down - - - 0 -   Over the last two weeks how often have you been bothered by trouble concentrating on things, such as reading the newspaper or watching television - - - 0 -   Over the last two weeks how often have you been bothered by moving or speaking so slowly that other people could have noticed. Or the opposite - being so fidgety or restless that you have been moving around a lot more than usual. - - - 0 -   Over the last two weeks how often have you been  bothered by thoughts that you would be better off dead, or of hurting yourself - - - 0 -   If you checked off any problems, how difficult have these problems made it for you to do your work, take care of things at home or get along with other people? - - - Very difficult -   Total Score - - - 9 -           Past Medical History:   Diagnosis Date    Anxiety     Hyperlipidemia     Hypertension     Pulmonary embolism        Past Surgical History:   Procedure Laterality Date    APPENDECTOMY      CATARACT EXTRACTION W/ INTRAOCULAR LENS  IMPLANT, BILATERAL      HYSTERECTOMY      Partial     LEFT KNEE REPLACEMENT         Family History   Problem Relation Age of Onset    Cancer Mother     Stroke Daughter     Heart disease Neg Hx        Social History     Tobacco Use    Smoking status: Former Smoker     Packs/day: 0.00     Years: 4.00     Pack years: 0.00     Types: Cigarettes    Smokeless tobacco: Former User   Substance Use Topics    Alcohol use: Yes     Alcohol/week: 1.0 standard drinks     Types: 1 Glasses of wine per week    Drug use: No       Social History     Social History Narrative    Lives with her  and son Zuhair moved in 2019 to assist with care. They have 4 children. She is originally from [place]. She is sedentary. She is a practicing [employer] and she participates in [employer]. DME: rollator walker. Tub bench       ALLERGIES:   Review of patient's allergies indicates:   Allergen Reactions    Prozac [fluoxetine]      dizziness       MEDS:   Current Outpatient Medications:     donepezil (ARICEPT) 5 MG tablet, Take 1 tablet (5 mg total) by mouth every evening., Disp: 30 tablet, Rfl: 2    metoprolol succinate (TOPROL-XL) 25 MG 24 hr tablet, Take 1 tablet (25 mg total) by mouth once daily., Disp: 90 tablet, Rfl: 1    rosuvastatin (CRESTOR) 10 MG tablet, TAKE 1 TABLET BY MOUTH ONCE DAILY FOR CHOLESTEROL., Disp: 90 tablet, Rfl: 3    sertraline (ZOLOFT) 50 MG tablet, Take 1  "tablet (50 mg total) by mouth once daily., Disp: 90 tablet, Rfl: 1    varicella-zoster gE-AS01B, PF, (SHINGRIX, PF,) 50 mcg/0.5 mL injection, Inject 0.5mL IM at 0 and 2-6 months, Disp: 0.5 mL, Rfl: 1    warfarin (COUMADIN) 4 MG tablet, Take 1 tablet (4 mg total) by mouth Daily., Disp: 90 tablet, Rfl: 1    ALPRAZolam (XANAX) 0.5 MG tablet, Take 1 tablet (0.5 mg total) by mouth nightly as needed for Anxiety., Disp: 30 tablet, Rfl: 2    DIPH,PERTUSS,ACEL,,TET VAC,PF, ADULT (ADACEL) 2 Lf-(2.5-5-3-5 mcg)-5Lf/0.5 mL Syrg, Inject 0.5 mLs into the muscle once. for 1 dose, Disp: 0.5 mL, Rfl: 0    OBJECTIVE:   Vitals:    02/27/20 0940   BP: 110/60   BP Location: Left arm   Patient Position: Sitting   BP Method: X-Large (Manual)   Pulse: 71   Resp: 18   Temp: 98.1 °F (36.7 °C)   TempSrc: Oral   SpO2: 98%   Weight: 80.4 kg (177 lb 3.2 oz)   Height: 5' 3" (1.6 m)     Body mass index is 31.39 kg/m².    Physical Exam   Constitutional: No distress.   Neck: Neck supple.   Cardiovascular: Normal rate, regular rhythm, normal heart sounds and intact distal pulses. Exam reveals no gallop and no friction rub.   No murmur heard.  Pulmonary/Chest: Effort normal and breath sounds normal.   Musculoskeletal: She exhibits no edema.   Neurological: She is alert.         PERTINENT RESULTS:   BMP  Lab Results   Component Value Date     11/18/2019    K 4.7 11/18/2019     11/18/2019    CO2 30 (H) 11/18/2019    BUN 23 (H) 11/18/2019    CREATININE 1.09 11/18/2019    CALCIUM 9.8 11/18/2019    ANIONGAP 7 (L) 11/18/2019    ESTGFRAFRICA 55.0 (A) 11/18/2019    EGFRNONAA 47.7 (A) 11/18/2019     Lab Results   Component Value Date    WBC 7.85 11/18/2019    HGB 12.8 11/18/2019    HCT 39.9 11/18/2019    MCV 93 11/18/2019     11/18/2019       Lab Results   Component Value Date    CALCIUM 9.8 11/18/2019     Lab Results   Component Value Date    INR 2.2 (H) 02/20/2020    INR 2.9 01/30/2020    INR 2.2 (H) 01/24/2020 "       ASSESSMENT/PLAN:  Problem List Items Addressed This Visit        Neuro    Mild cognitive impairment - Primary    Overview     - 1/24/2020 MMSE 23         Current Assessment & Plan     - anticipatory guidance  - supportive care  - since reports sleep issues with Aricept at bedtime or okay trial daytime dosing            Psychiatric    Recurrent major depressive disorder, in full remission    Current Assessment & Plan     - well controlled  - continue current medications         Relevant Medications    ALPRAZolam (XANAX) 0.5 MG tablet    Generalized anxiety disorder    Current Assessment & Plan     - with benzodiazepine dependence  - continue Sertraline 50 mg daily         Relevant Medications    ALPRAZolam (XANAX) 0.5 MG tablet    Benzodiazepine dependence    Current Assessment & Plan     - counseling on safe use of medication  - discussed attempting to reduce use with goal to stop medication   - son agreeable with plan  - no falls or safety issues at this time   -  reviewed            Cardiac/Vascular    Essential hypertension (Chronic)    Current Assessment & Plan     - well controlled  - continue current medications         Relevant Orders    Basic metabolic panel    Paroxysmal atrial fibrillation    Overview     - followed by Dr. [facility]  - OAC warfarin  - rate controlled with BB            Renal/    Stage 3 chronic kidney disease    Current Assessment & Plan     - stable  - monitor         Relevant Orders    Basic metabolic panel       Hematology    Long term (current) use of anticoagulants    Overview     - OAC Warfarin for pAFib and history of pulmonary embolism  - INR goal 2-3 and [facility]         Relevant Orders    CBC Without Differential          ORDERS:   Orders Placed This Encounter    CBC Without Differential    Basic metabolic panel    ALPRAZolam (XANAX) 0.5 MG tablet    DIPH,PERTUSS,ACEL,,TET VAC,PF, ADULT (ADACEL) 2 Lf-(2.5-5-3-5 mcg)-5Lf/0.5 mL Syrg     varicella-zoster gE-AS01B, PF, (SHINGRIX, PF,) 50 mcg/0.5 mL injection     Vaccines recommended: Shingles and Tdap and pt agreeable. Sent to pharmacy    Follow-up 3 months or sooner if any concerns.    Dr. Milli Rey D.O.   [facility]

## 2020-02-27 ENCOUNTER — OFFICE VISIT (OUTPATIENT)
Dept: FAMILY MEDICINE | Facility: CLINIC | Age: 85
End: 2020-02-27
Payer: MEDICARE

## 2020-02-27 VITALS
DIASTOLIC BLOOD PRESSURE: 60 MMHG | BODY MASS INDEX: 31.39 KG/M2 | HEIGHT: 63 IN | HEART RATE: 71 BPM | SYSTOLIC BLOOD PRESSURE: 110 MMHG | RESPIRATION RATE: 18 BRPM | OXYGEN SATURATION: 98 % | TEMPERATURE: 98 F | WEIGHT: 177.19 LBS

## 2020-02-27 DIAGNOSIS — F33.42 RECURRENT MAJOR DEPRESSIVE DISORDER, IN FULL REMISSION: ICD-10-CM

## 2020-02-27 DIAGNOSIS — I48.0 PAROXYSMAL ATRIAL FIBRILLATION: ICD-10-CM

## 2020-02-27 DIAGNOSIS — F41.1 GENERALIZED ANXIETY DISORDER: ICD-10-CM

## 2020-02-27 DIAGNOSIS — Z79.01 LONG TERM (CURRENT) USE OF ANTICOAGULANTS: ICD-10-CM

## 2020-02-27 DIAGNOSIS — I10 ESSENTIAL HYPERTENSION: Chronic | ICD-10-CM

## 2020-02-27 DIAGNOSIS — G31.84 MILD COGNITIVE IMPAIRMENT: Primary | ICD-10-CM

## 2020-02-27 DIAGNOSIS — F13.20 BENZODIAZEPINE DEPENDENCE: ICD-10-CM

## 2020-02-27 DIAGNOSIS — N18.30 STAGE 3 CHRONIC KIDNEY DISEASE: ICD-10-CM

## 2020-02-27 PROBLEM — G89.29 CHRONIC PAIN OF BOTH KNEES: Status: ACTIVE | Noted: 2020-02-27

## 2020-02-27 PROBLEM — M25.561 CHRONIC PAIN OF BOTH KNEES: Status: ACTIVE | Noted: 2020-02-27

## 2020-02-27 PROBLEM — M25.562 CHRONIC PAIN OF BOTH KNEES: Status: ACTIVE | Noted: 2020-02-27

## 2020-02-27 PROCEDURE — 1125F PR PAIN SEVERITY QUANTIFIED, PAIN PRESENT: ICD-10-PCS | Mod: S$GLB,,, | Performed by: FAMILY MEDICINE

## 2020-02-27 PROCEDURE — 1101F PR PT FALLS ASSESS DOC 0-1 FALLS W/OUT INJ PAST YR: ICD-10-PCS | Mod: CPTII,S$GLB,, | Performed by: FAMILY MEDICINE

## 2020-02-27 PROCEDURE — 99214 PR OFFICE/OUTPT VISIT, EST, LEVL IV, 30-39 MIN: ICD-10-PCS | Mod: S$GLB,,, | Performed by: FAMILY MEDICINE

## 2020-02-27 PROCEDURE — 99999 PR PBB SHADOW E&M-EST. PATIENT-LVL IV: ICD-10-PCS | Mod: PBBFAC,,, | Performed by: FAMILY MEDICINE

## 2020-02-27 PROCEDURE — 99999 PR PBB SHADOW E&M-EST. PATIENT-LVL IV: CPT | Mod: PBBFAC,,, | Performed by: FAMILY MEDICINE

## 2020-02-27 PROCEDURE — 1159F MED LIST DOCD IN RCRD: CPT | Mod: S$GLB,,, | Performed by: FAMILY MEDICINE

## 2020-02-27 PROCEDURE — 1101F PT FALLS ASSESS-DOCD LE1/YR: CPT | Mod: CPTII,S$GLB,, | Performed by: FAMILY MEDICINE

## 2020-02-27 PROCEDURE — 3074F SYST BP LT 130 MM HG: CPT | Mod: CPTII,S$GLB,, | Performed by: FAMILY MEDICINE

## 2020-02-27 PROCEDURE — 1125F AMNT PAIN NOTED PAIN PRSNT: CPT | Mod: S$GLB,,, | Performed by: FAMILY MEDICINE

## 2020-02-27 PROCEDURE — 99214 OFFICE O/P EST MOD 30 MIN: CPT | Mod: S$GLB,,, | Performed by: FAMILY MEDICINE

## 2020-02-27 PROCEDURE — 3074F PR MOST RECENT SYSTOLIC BLOOD PRESSURE < 130 MM HG: ICD-10-PCS | Mod: CPTII,S$GLB,, | Performed by: FAMILY MEDICINE

## 2020-02-27 PROCEDURE — 3078F DIAST BP <80 MM HG: CPT | Mod: CPTII,S$GLB,, | Performed by: FAMILY MEDICINE

## 2020-02-27 PROCEDURE — 3078F PR MOST RECENT DIASTOLIC BLOOD PRESSURE < 80 MM HG: ICD-10-PCS | Mod: CPTII,S$GLB,, | Performed by: FAMILY MEDICINE

## 2020-02-27 PROCEDURE — 1159F PR MEDICATION LIST DOCUMENTED IN MEDICAL RECORD: ICD-10-PCS | Mod: S$GLB,,, | Performed by: FAMILY MEDICINE

## 2020-02-27 RX ORDER — DONEPEZIL HYDROCHLORIDE 10 MG/1
10 TABLET, FILM COATED ORAL NIGHTLY
Qty: 90 TABLET | Refills: 0 | Status: CANCELLED | OUTPATIENT
Start: 2020-02-27

## 2020-02-27 RX ORDER — ALPRAZOLAM 0.5 MG/1
0.5 TABLET ORAL NIGHTLY PRN
Qty: 30 TABLET | Refills: 2 | Status: SHIPPED | OUTPATIENT
Start: 2020-02-27 | End: 2020-06-22 | Stop reason: SDUPTHER

## 2020-02-27 NOTE — ASSESSMENT & PLAN NOTE
- anticipatory guidance  - supportive care  - since reports sleep issues with Aricept at bedtime or okay trial daytime dosing

## 2020-03-05 ENCOUNTER — TELEPHONE (OUTPATIENT)
Dept: INTERNAL MEDICINE | Facility: CLINIC | Age: 85
End: 2020-03-05

## 2020-03-05 DIAGNOSIS — K21.9 GASTROESOPHAGEAL REFLUX DISEASE WITHOUT ESOPHAGITIS: Primary | ICD-10-CM

## 2020-03-05 RX ORDER — OMEPRAZOLE 40 MG/1
40 CAPSULE, DELAYED RELEASE ORAL DAILY
Qty: 90 CAPSULE | Refills: 0 | Status: SHIPPED | OUTPATIENT
Start: 2020-03-05 | End: 2020-06-22 | Stop reason: SDUPTHER

## 2020-03-05 NOTE — TELEPHONE ENCOUNTER
Pt would like a refill on omeprazole (PRILOSEC) 40 MG capsule it was last filled by Dr. HINTON on 1/25/19

## 2020-03-05 NOTE — TELEPHONE ENCOUNTER
----- Message from Carolyn Hernandez sent at 3/5/2020  3:20 PM CST -----  Type:  RX Refill Request    Who Called:self  Refill or New Rx:refill  RX Name and Strength:omeprazole (PRILOSEC) 40 MG capsule  How is the patient currently taking it? (ex. 1XDay):Take 1 capsule (40 mg total) by mouth once daily  Is this a 30 day or 90 day RX:30 day   Preferred Pharmacy with phone number:[facility]  [place] - [address], [facility]  Local or Mail Order:local   Ordering Provider:Dr. Adams   Would the patient rather a call back or a response via [facility]?callback  Best Call Back Number:[phone]  Additional Information:N/A

## 2020-03-06 NOTE — TELEPHONE ENCOUNTER
Sent medication per patient's request  Dr. Milli Rey D.O.   [facility]

## 2020-03-31 NOTE — PROGRESS NOTES
3/31 - last scheduled INR missed. Will plan for repeat at 12 weeks since last draw since levels are stable (per CHEST and 2/2 Covid).

## 2020-05-13 ENCOUNTER — ANTI-COAG VISIT (OUTPATIENT)
Dept: CARDIOLOGY | Facility: CLINIC | Age: 85
End: 2020-05-13
Payer: MEDICARE

## 2020-05-13 DIAGNOSIS — I48.0 PAROXYSMAL ATRIAL FIBRILLATION: ICD-10-CM

## 2020-05-13 DIAGNOSIS — Z86.711 HISTORY OF PULMONARY EMBOLISM: ICD-10-CM

## 2020-05-13 DIAGNOSIS — Z79.01 LONG TERM (CURRENT) USE OF ANTICOAGULANTS: ICD-10-CM

## 2020-05-13 PROCEDURE — 93793 PR ANTICOAGULANT MGMT FOR PT TAKING WARFARIN: ICD-10-PCS | Mod: S$GLB,,,

## 2020-05-13 PROCEDURE — 93793 ANTICOAG MGMT PT WARFARIN: CPT | Mod: S$GLB,,,

## 2020-05-22 ENCOUNTER — TELEPHONE (OUTPATIENT)
Dept: FAMILY MEDICINE | Facility: CLINIC | Age: 85
End: 2020-05-22

## 2020-05-22 NOTE — TELEPHONE ENCOUNTER
----- Message from Milli Rey DO sent at 5/21/2020  7:59 AM CDT -----  Please call patient with results. She called 5/28/2020 visit. Please see if she would like to reschedule for 3-4 months. Labs stable. Blood count normal. Kidney function slightly improved but still shows chronic kidney disease.

## 2020-05-22 NOTE — TELEPHONE ENCOUNTER
Left a message informing pt of her test results and also asked pt to call back to reschedule her appointment.

## 2020-06-22 DIAGNOSIS — I48.0 PAROXYSMAL ATRIAL FIBRILLATION: ICD-10-CM

## 2020-06-22 DIAGNOSIS — F33.42 RECURRENT MAJOR DEPRESSIVE DISORDER, IN FULL REMISSION: ICD-10-CM

## 2020-06-22 DIAGNOSIS — F41.1 GENERALIZED ANXIETY DISORDER: ICD-10-CM

## 2020-06-22 DIAGNOSIS — G31.84 MILD COGNITIVE IMPAIRMENT: ICD-10-CM

## 2020-06-22 DIAGNOSIS — I10 ESSENTIAL HYPERTENSION: Chronic | ICD-10-CM

## 2020-06-22 DIAGNOSIS — Z79.01 LONG TERM (CURRENT) USE OF ANTICOAGULANTS: ICD-10-CM

## 2020-06-22 DIAGNOSIS — K21.9 GASTROESOPHAGEAL REFLUX DISEASE WITHOUT ESOPHAGITIS: ICD-10-CM

## 2020-06-22 DIAGNOSIS — E78.00 HYPERCHOLESTEROLEMIA: ICD-10-CM

## 2020-06-22 RX ORDER — SERTRALINE HYDROCHLORIDE 50 MG/1
50 TABLET, FILM COATED ORAL DAILY
Qty: 90 TABLET | Refills: 1 | Status: SHIPPED | OUTPATIENT
Start: 2020-06-22 | End: 2020-12-07 | Stop reason: SDUPTHER

## 2020-06-22 RX ORDER — METOPROLOL SUCCINATE 25 MG/1
25 TABLET, EXTENDED RELEASE ORAL DAILY
Qty: 90 TABLET | Refills: 1 | Status: SHIPPED | OUTPATIENT
Start: 2020-06-22 | End: 2020-09-21

## 2020-06-22 RX ORDER — ZOSTER VACCINE RECOMBINANT, ADJUVANTED 50 MCG/0.5
KIT INTRAMUSCULAR
Qty: 0.5 ML | Refills: 1 | Status: SHIPPED | OUTPATIENT
Start: 2020-06-22 | End: 2020-07-31

## 2020-06-22 RX ORDER — DONEPEZIL HYDROCHLORIDE 5 MG/1
5 TABLET, FILM COATED ORAL NIGHTLY
Qty: 30 TABLET | Refills: 2 | Status: SHIPPED | OUTPATIENT
Start: 2020-06-22 | End: 2020-07-31

## 2020-06-22 RX ORDER — OMEPRAZOLE 40 MG/1
40 CAPSULE, DELAYED RELEASE ORAL DAILY
Qty: 90 CAPSULE | Refills: 0 | Status: SHIPPED | OUTPATIENT
Start: 2020-06-22 | End: 2020-09-16

## 2020-06-22 RX ORDER — ROSUVASTATIN CALCIUM 10 MG/1
TABLET, COATED ORAL
Qty: 90 TABLET | Refills: 3 | Status: SHIPPED | OUTPATIENT
Start: 2020-06-22 | End: 2021-06-13

## 2020-06-22 RX ORDER — ALPRAZOLAM 0.5 MG/1
0.5 TABLET ORAL NIGHTLY PRN
Qty: 30 TABLET | Refills: 2 | Status: SHIPPED | OUTPATIENT
Start: 2020-06-22 | End: 2020-06-25 | Stop reason: SDUPTHER

## 2020-06-22 RX ORDER — WARFARIN 4 MG/1
4 TABLET ORAL DAILY
Qty: 90 TABLET | Refills: 1 | Status: SHIPPED | OUTPATIENT
Start: 2020-06-22 | End: 2020-09-10 | Stop reason: SDUPTHER

## 2020-06-22 NOTE — TELEPHONE ENCOUNTER
----- Message from Bismark Forman sent at 6/22/2020 10:18 AM CDT -----  Regarding: Medications  Type:  Needs Medical Advice    Who Called:  patient  Pharmacy name and phone #:   [facility] [place]  Would the patient rather a call back or a response via [facility]? Call back  Best Call Back Number:  [phone]  Additional Information:  the previous pharmacy closed so she needs all her medications sent to the new pharmacy above

## 2020-06-24 ENCOUNTER — TELEPHONE (OUTPATIENT)
Dept: FAMILY MEDICINE | Facility: CLINIC | Age: 85
End: 2020-06-24

## 2020-06-24 NOTE — TELEPHONE ENCOUNTER
----- Message from Gilma Tang sent at 6/24/2020 11:27 AM CDT -----  Contact: [phone] /Self  Patient is calling to get an update on sertraline (ZOLOFT) 50 MG tablet and ALPRAZolam (XANAX) 0.5 MG tablet. [facility] [place]. Please advise.

## 2020-06-25 DIAGNOSIS — F33.42 RECURRENT MAJOR DEPRESSIVE DISORDER, IN FULL REMISSION: ICD-10-CM

## 2020-06-25 DIAGNOSIS — F41.1 GENERALIZED ANXIETY DISORDER: ICD-10-CM

## 2020-06-25 RX ORDER — ALPRAZOLAM 0.5 MG/1
0.5 TABLET ORAL NIGHTLY PRN
Qty: 30 TABLET | Refills: 0 | Status: SHIPPED | OUTPATIENT
Start: 2020-06-25 | End: 2020-07-21

## 2020-06-25 NOTE — TELEPHONE ENCOUNTER
----- Message from Yana Boyer sent at 6/25/2020  9:36 AM CDT -----  Regarding: medication  Contact: patient  Type:  Needs Medical Advice    Who Called: pt  Advice Regarding: medications are still not at [facility] [place]; ALPRAZolam (XANAX) 0.5 MG tablet and sertraline (ZOLOFT) 50 MG tablet   Would the patient rather a call back or a response via [facility]? call  Best Call Back Number: [phone]  Additional Information: n/a

## 2020-07-10 ENCOUNTER — ANTI-COAG VISIT (OUTPATIENT)
Dept: CARDIOLOGY | Facility: CLINIC | Age: 85
End: 2020-07-10
Payer: MEDICARE

## 2020-07-10 DIAGNOSIS — Z86.711 HISTORY OF PULMONARY EMBOLISM: ICD-10-CM

## 2020-07-10 DIAGNOSIS — Z79.01 LONG TERM (CURRENT) USE OF ANTICOAGULANTS: ICD-10-CM

## 2020-07-10 DIAGNOSIS — I48.0 PAROXYSMAL ATRIAL FIBRILLATION: ICD-10-CM

## 2020-07-10 PROCEDURE — 93793 ANTICOAG MGMT PT WARFARIN: CPT | Mod: S$GLB,,,

## 2020-07-10 PROCEDURE — 93793 PR ANTICOAGULANT MGMT FOR PT TAKING WARFARIN: ICD-10-PCS | Mod: S$GLB,,,

## 2020-07-28 NOTE — PROGRESS NOTES
FAMILY MEDICINE    Patient Active Problem List   Diagnosis    Essential hypertension    Hypercholesterolemia    History of pulmonary embolism    Gastroesophageal reflux disease without esophagitis    Chronic rhinitis    Stage 3 chronic kidney disease    Long term (current) use of anticoagulants    Recurrent major depressive disorder, in full remission    Generalized anxiety disorder    Benzodiazepine dependence    Paroxysmal atrial fibrillation    Mild cognitive impairment    Chronic pain of both knees       CC:   Chief Complaint   Patient presents with    Follow-up    Headache     Wakes up every morning with a headache    Sinusitis       SUBJECTIVE:  Alessandra Hidalgo   is a 84 y.o. female  - with hypertension, hyperlipidemia, history of pulmonary embolism (2016) on OAC Warfarin, emphysema, depression, anxiety on chronic benzodiazepine for sleep, CKD stage 3, pAfib (followed by Dr. [facility]), mild cognitive impairment,chronic knee pain with history of knee replacement and GERD presents to follow-up anxiety and reports concern for congestion and sinus headache     Cardiologist: Dr[facility]  [facility]     She lives with her son (Zuhair) and  (Terry). She uses a rollator walker    1. Sinus    Pt represent with congestion, headache that started more than 3 weeks ago. Initial symptoms presented with congestion and progressed to headaches and fatigue.     Nasal discharge: yes yellow  Location of sinus pain/pressure: bilateral frontal  Ear symptoms: pressure  Eye symptoms: tearing  Fever: none    Current treatments: none  Last episode of sinus infection: cannot recall    2. Hypertension with pAfib     Current medication treatment:   metoprolol succinate (TOPROL-XL) 25 MG 24 hr tablet, Take 1 tablet (25 mg total) by mouth once daily., Disp: 90 tablet, Rfl: 0     Medication side effects: denies    Home BP cuff: none     3. Depression and Anxiety  - mild cognitive  "impairment 1/24/2020 MMSE 23/30     Prior treatments: Duloxetine 60 mg daily, Prozac (dizziness)  Side effects of prior treatment: lightheaded, dizziness, not effective     Current treatment:   ALPRAZolam (XANAX) 0.5 MG tablet, Take 1 tablet (0.5 mg total) by mouth nightly as needed for Anxiety., Disp: 30 tablet, Rfl: 2  - has been unable to wean off of medication  donepezil (ARICEPT) 5 MG tablet, Take 1 tablet (5 mg total) by mouth every evening., Disp: 30 tablet, Rfl: 2  - no taking because made her unable to sleep  sertraline (ZOLOFT) 50 MG tablet, Take 1 tablet (50 mg total) by mouth once daily., Disp: 90 tablet, Rfl: 1    Side effects of current treatment: denies     Symptoms related: reports well controlled but still taking Alprazolam 0.5 mg around lunch time "when I think that I need it"  Panic attacks: yes     Hopelessness: denies  Sleep: no issues and reports that sleep is "good"  Suicidal thoughts: denies  Thoughts of self harm:denies  Thoughts of harm to others: denies  History of suicide attempts: denies  Family history of suicide:denies     Support system: family  Counselor: none    3. Mild cognitive impairment  - Initial assessment: "Son reports difficulty with short term memory that has been present since pulmonary embolism with sudden cardiac arrest 2016 following left knee replacement. He reports that long term seem good but short term not as good. She will often ask him the same question over and over again. He does not assist with medications so unsure if she is taking constantly but she is able to state her medication and how she takes them"     Age diagnosed: 85 yo  Neurology or Neuropsychiatry evaluation: none     Last MMSE: 1/24/2020 23/30    She was started on Aricept 5 mg at bedtime last visit but reports that it made it difficult for her to sleep so she stopped the medication. Called and spoke with son and he reports that she opted not to start the medication.     Current symptoms: short " term memory loss without behavioral disturbances or sleep issues  Behavioral disturbanced: none  Sleep: none    Current medication regimen:   ALPRAZolam (XANAX) 0.5 MG tablet, Take 1 tablet (0.5 mg total) by mouth nightly as needed for Anxiety., Disp: 30 tablet, Rfl: 2  donepezil (ARICEPT) 5 MG tablet, Take 1 tablet (5 mg total) by mouth every evening., Disp: 30 tablet, Rfl: 2  sertraline (ZOLOFT) 50 MG tablet, Take 1 tablet (50 mg total) by mouth once daily., Disp: 90 tablet, Rfl: 1     Living situation: son (Zuhair) lives with her and her  and assists in care  DME: rollator      ROS: Review of Systems   Constitutional: Negative.    HENT: Positive for congestion, postnasal drip, rhinorrhea, sinus pressure and sinus pain.         Otherwise negative   Eyes: Negative.    Respiratory: Negative.    Cardiovascular: Negative.    Gastrointestinal: Negative.    Endocrine: Negative.    Genitourinary: Negative.    Musculoskeletal: Positive for arthralgias.        Otherwise negative   Skin: Negative.    Allergic/Immunologic: Negative.    Neurological: Negative.    Hematological: Negative.    Psychiatric/Behavioral: Negative for agitation, behavioral problems, sleep disturbance and suicidal ideas. The patient is nervous/anxious.         Otherwise negative     Depression Patient Health Questionnaire 7/31/2020 2/27/2020 1/24/2020 8/27/2019 1/25/2019 4/11/2018   Over the last two weeks how often have you been bothered by little interest or pleasure in doing things 1 1 1 2 3 0   Over the last two weeks how often have you been bothered by feeling down, depressed or hopeless 1 1 1 1 3 0   PHQ-2 Total Score 2 2 2 3 6 0   Over the last two weeks how often have you been bothered by trouble falling or staying asleep, or sleeping too much - - - - 0 -   Over the last two weeks how often have you been bothered by feeling tired or having little energy - - - - 3 -   Over the last two weeks how often have you been bothered by a poor  appetite or overeating - - - - 0 -   Over the last two weeks how often have you been bothered by feeling bad about yourself - or that you are a failure or have let yourself or your family down - - - - 0 -   Over the last two weeks how often have you been bothered by trouble concentrating on things, such as reading the newspaper or watching television - - - - 0 -   Over the last two weeks how often have you been bothered by moving or speaking so slowly that other people could have noticed. Or the opposite - being so fidgety or restless that you have been moving around a lot more than usual. - - - - 0 -   Over the last two weeks how often have you been bothered by thoughts that you would be better off dead, or of hurting yourself - - - - 0 -   If you checked off any problems, how difficult have these problems made it for you to do your work, take care of things at home or get along with other people? - - - - Very difficult -   Total Score - - - - 9 -           Past Medical History:   Diagnosis Date    Anxiety     Hyperlipidemia     Hypertension     Pulmonary embolism        Past Surgical History:   Procedure Laterality Date    APPENDECTOMY      CATARACT EXTRACTION W/ INTRAOCULAR LENS  IMPLANT, BILATERAL      HYSTERECTOMY      Partial     LEFT KNEE REPLACEMENT         Family History   Problem Relation Age of Onset    Cancer Mother     Stroke Daughter     Heart disease Neg Hx        Social History     Tobacco Use    Smoking status: Former Smoker     Packs/day: 0.00     Years: 4.00     Pack years: 0.00     Types: Cigarettes    Smokeless tobacco: Former User   Substance Use Topics    Alcohol use: Yes     Alcohol/week: 1.0 standard drinks     Types: 1 Glasses of wine per week    Drug use: No       Social History     Social History Narrative    Lives with her  and son Zuhair moved in 2019 to assist with care. They have 4 children. She is originally from [place]. She is sedentary. 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 DME: rollator walker. Tub bench       ALLERGIES:   Review of patient's allergies indicates:   Allergen Reactions    Prozac [fluoxetine]      dizziness       MEDS:   Current Outpatient Medications:     ALPRAZolam (XANAX) 0.5 MG tablet, Take 1 tablet (0.5 mg total) by mouth nightly., Disp: 30 tablet, Rfl: 2    metoprolol succinate (TOPROL-XL) 25 MG 24 hr tablet, Take 1 tablet (25 mg total) by mouth once daily., Disp: 90 tablet, Rfl: 1    omeprazole (PRILOSEC) 40 MG capsule, Take 1 capsule (40 mg total) by mouth once daily., Disp: 90 capsule, Rfl: 0    rosuvastatin (CRESTOR) 10 MG tablet, TAKE 1 TABLET BY MOUTH ONCE DAILY FOR CHOLESTEROL., Disp: 90 tablet, Rfl: 3    sertraline (ZOLOFT) 50 MG tablet, Take 1 tablet (50 mg total) by mouth once daily., Disp: 90 tablet, Rfl: 1    warfarin (COUMADIN) 4 MG tablet, Take 1 tablet (4 mg total) by mouth Daily., Disp: 90 tablet, Rfl: 1    amoxicillin (AMOXIL) 875 MG tablet, Take 1 tablet (875 mg total) by mouth 2 (two) times daily. for 10 days, Disp: 20 tablet, Rfl: 0    azelastine (ASTELIN) 137 mcg (0.1 %) nasal spray, 1 spray (137 mcg total) by Nasal route 2 (two) times daily., Disp: 30 mL, Rfl: 0    OBJECTIVE:   Vitals:    07/31/20 0710   BP: 122/68   BP Location: Left arm   Patient Position: Sitting   BP Method: Medium (Manual)   Pulse: 69   Temp: 98.6 °F (37 °C)   SpO2: 97%   Weight: 79.9 kg (176 lb 1.6 oz)   Height: 5' 3" (1.6 m)     Body mass index is 31.19 kg/m².    Physical Exam  Constitutional:       General: She is not in acute distress.  HENT:      Right Ear: Tympanic membrane and ear canal normal.      Left Ear: Tympanic membrane and ear canal normal.      Nose: Mucosal edema, congestion and rhinorrhea present.      Right Turbinates: Swollen.      Right Sinus: Frontal sinus tenderness present. No maxillary sinus tenderness.      Left Sinus: Frontal sinus tenderness present. No maxillary sinus " tenderness.   Neck:      Musculoskeletal: Neck supple.   Cardiovascular:      Rate and Rhythm: Normal rate and regular rhythm.      Pulses: Normal pulses.      Heart sounds: Normal heart sounds. No murmur. No friction rub. No gallop.    Pulmonary:      Effort: Pulmonary effort is normal.      Breath sounds: Normal breath sounds. No wheezing, rhonchi or rales.   Abdominal:      General: Abdomen is flat. Bowel sounds are normal. There is no distension.      Palpations: Abdomen is soft.   Musculoskeletal:      Right lower leg: No edema.      Left lower leg: No edema.   Neurological:      Mental Status: She is alert.           PERTINENT RESULTS:   BMP  Lab Results   Component Value Date     05/13/2020    K 4.9 05/13/2020     05/13/2020    CO2 27 05/13/2020    BUN 23 (H) 05/13/2020    CREATININE 1.06 05/13/2020    CALCIUM 9.8 05/13/2020    ANIONGAP 8 05/13/2020    ESTGFRAFRICA 56.5 (A) 05/13/2020    EGFRNONAA 49.0 (A) 05/13/2020     Lab Results   Component Value Date    WBC 7.85 11/18/2019    HGB 12.8 11/18/2019    HCT 39.9 11/18/2019    MCV 93 11/18/2019     11/18/2019       Lab Results   Component Value Date    CALCIUM 9.8 05/13/2020     Lab Results   Component Value Date    INR 2.9 (H) 07/10/2020    INR 2.1 (H) 05/13/2020    INR 2.2 (H) 02/20/2020       ASSESSMENT/PLAN:  Problem List Items Addressed This Visit        Neuro    Mild cognitive impairment    Overview     - 1/24/2020 MMSE 23  - trial Aricept and pt did to not tolerate  - patient declined medication and family is supportive  - supportive care  - counseling with anticipatory guidance to rogelio Moffett            Psychiatric    Benzodiazepine dependence    Overview     - counseling on safe use of medication  - has been unable to wean off medication  - no falls or safety issues at this time   -  reviewed         Generalized anxiety disorder    Current Assessment & Plan     - with benzodiazepine dependence  - continue Sertraline 50 mg daily          Relevant Medications    ALPRAZolam (XANAX) 0.5 MG tablet    Recurrent major depressive disorder, in full remission    Current Assessment & Plan     - well controlled  - continue current medication         Relevant Medications    ALPRAZolam (XANAX) 0.5 MG tablet       ENT    Chronic rhinitis    Relevant Medications    azelastine (ASTELIN) 137 mcg (0.1 %) nasal spray       Cardiac/Vascular    Essential hypertension - Primary (Chronic)    Current Assessment & Plan     - well controlled  - continue current medications         Relevant Orders    Comprehensive metabolic panel    CBC Without Differential    Lipid Panel    Paroxysmal atrial fibrillation    Overview     - followed by Dr. [facility]  - OAC warfarin  - rate controlled with BB            Renal/    Stage 3 chronic kidney disease    Current Assessment & Plan     Lab Results   Component Value Date    CREATININE 1.06 05/13/2020     - stable         Relevant Orders    Comprehensive metabolic panel      Other Visit Diagnoses     Acute non-recurrent frontal sinusitis        Relevant Medications    amoxicillin (AMOXIL) 875 MG tablet          ORDERS:   Orders Placed This Encounter    Comprehensive metabolic panel    CBC Without Differential    Lipid Panel    azelastine (ASTELIN) 137 mcg (0.1 %) nasal spray    amoxicillin (AMOXIL) 875 MG tablet    ALPRAZolam (XANAX) 0.5 MG tablet     Vaccines recommended: Shingles and Tdap and pt agreeable. Sent to pharmacy    Follow-up 3 months or sooner if any concerns.    Dr. Milli Rey D.O.   Family Medicine

## 2020-07-31 ENCOUNTER — OFFICE VISIT (OUTPATIENT)
Dept: FAMILY MEDICINE | Facility: CLINIC | Age: 85
End: 2020-07-31
Payer: MEDICARE

## 2020-07-31 VITALS
HEIGHT: 63 IN | BODY MASS INDEX: 31.21 KG/M2 | DIASTOLIC BLOOD PRESSURE: 68 MMHG | HEART RATE: 69 BPM | OXYGEN SATURATION: 97 % | TEMPERATURE: 99 F | SYSTOLIC BLOOD PRESSURE: 122 MMHG | WEIGHT: 176.13 LBS

## 2020-07-31 DIAGNOSIS — J31.0 CHRONIC RHINITIS: ICD-10-CM

## 2020-07-31 DIAGNOSIS — I10 ESSENTIAL HYPERTENSION: Primary | Chronic | ICD-10-CM

## 2020-07-31 DIAGNOSIS — F33.42 RECURRENT MAJOR DEPRESSIVE DISORDER, IN FULL REMISSION: ICD-10-CM

## 2020-07-31 DIAGNOSIS — J01.10 ACUTE NON-RECURRENT FRONTAL SINUSITIS: ICD-10-CM

## 2020-07-31 DIAGNOSIS — N18.30 STAGE 3 CHRONIC KIDNEY DISEASE: ICD-10-CM

## 2020-07-31 DIAGNOSIS — F13.20 BENZODIAZEPINE DEPENDENCE: ICD-10-CM

## 2020-07-31 DIAGNOSIS — F41.1 GENERALIZED ANXIETY DISORDER: ICD-10-CM

## 2020-07-31 DIAGNOSIS — G31.84 MILD COGNITIVE IMPAIRMENT: ICD-10-CM

## 2020-07-31 DIAGNOSIS — I48.0 PAROXYSMAL ATRIAL FIBRILLATION: ICD-10-CM

## 2020-07-31 PROCEDURE — 1159F PR MEDICATION LIST DOCUMENTED IN MEDICAL RECORD: ICD-10-PCS | Mod: HCNC,S$GLB,, | Performed by: FAMILY MEDICINE

## 2020-07-31 PROCEDURE — 3078F DIAST BP <80 MM HG: CPT | Mod: HCNC,CPTII,S$GLB, | Performed by: FAMILY MEDICINE

## 2020-07-31 PROCEDURE — 99214 PR OFFICE/OUTPT VISIT, EST, LEVL IV, 30-39 MIN: ICD-10-PCS | Mod: HCNC,S$GLB,, | Performed by: FAMILY MEDICINE

## 2020-07-31 PROCEDURE — 1125F AMNT PAIN NOTED PAIN PRSNT: CPT | Mod: HCNC,S$GLB,, | Performed by: FAMILY MEDICINE

## 2020-07-31 PROCEDURE — 99499 UNLISTED E&M SERVICE: CPT | Mod: HCNC,S$GLB,, | Performed by: FAMILY MEDICINE

## 2020-07-31 PROCEDURE — 99499 RISK ADDL DX/OHS AUDIT: ICD-10-PCS | Mod: HCNC,S$GLB,, | Performed by: FAMILY MEDICINE

## 2020-07-31 PROCEDURE — 1125F PR PAIN SEVERITY QUANTIFIED, PAIN PRESENT: ICD-10-PCS | Mod: HCNC,S$GLB,, | Performed by: FAMILY MEDICINE

## 2020-07-31 PROCEDURE — 1159F MED LIST DOCD IN RCRD: CPT | Mod: HCNC,S$GLB,, | Performed by: FAMILY MEDICINE

## 2020-07-31 PROCEDURE — 3078F PR MOST RECENT DIASTOLIC BLOOD PRESSURE < 80 MM HG: ICD-10-PCS | Mod: HCNC,CPTII,S$GLB, | Performed by: FAMILY MEDICINE

## 2020-07-31 PROCEDURE — 99999 PR PBB SHADOW E&M-EST. PATIENT-LVL IV: CPT | Mod: PBBFAC,HCNC,, | Performed by: FAMILY MEDICINE

## 2020-07-31 PROCEDURE — 3074F PR MOST RECENT SYSTOLIC BLOOD PRESSURE < 130 MM HG: ICD-10-PCS | Mod: HCNC,CPTII,S$GLB, | Performed by: FAMILY MEDICINE

## 2020-07-31 PROCEDURE — 3074F SYST BP LT 130 MM HG: CPT | Mod: HCNC,CPTII,S$GLB, | Performed by: FAMILY MEDICINE

## 2020-07-31 PROCEDURE — 1101F PR PT FALLS ASSESS DOC 0-1 FALLS W/OUT INJ PAST YR: ICD-10-PCS | Mod: HCNC,CPTII,S$GLB, | Performed by: FAMILY MEDICINE

## 2020-07-31 PROCEDURE — 99999 PR PBB SHADOW E&M-EST. PATIENT-LVL IV: ICD-10-PCS | Mod: PBBFAC,HCNC,, | Performed by: FAMILY MEDICINE

## 2020-07-31 PROCEDURE — 99214 OFFICE O/P EST MOD 30 MIN: CPT | Mod: HCNC,S$GLB,, | Performed by: FAMILY MEDICINE

## 2020-07-31 PROCEDURE — 1101F PT FALLS ASSESS-DOCD LE1/YR: CPT | Mod: HCNC,CPTII,S$GLB, | Performed by: FAMILY MEDICINE

## 2020-07-31 RX ORDER — ALPRAZOLAM 0.5 MG/1
0.5 TABLET ORAL NIGHTLY
Qty: 30 TABLET | Refills: 2 | Status: SHIPPED | OUTPATIENT
Start: 2020-07-31 | End: 2020-11-24

## 2020-07-31 RX ORDER — AMOXICILLIN 875 MG/1
875 TABLET, FILM COATED ORAL 2 TIMES DAILY
Qty: 20 TABLET | Refills: 0 | Status: SHIPPED | OUTPATIENT
Start: 2020-07-31 | End: 2020-08-10

## 2020-07-31 RX ORDER — AZELASTINE 1 MG/ML
1 SPRAY, METERED NASAL 2 TIMES DAILY
Qty: 30 ML | Refills: 0 | Status: SHIPPED | OUTPATIENT
Start: 2020-07-31 | End: 2021-11-09 | Stop reason: SDUPTHER

## 2020-07-31 NOTE — PATIENT INSTRUCTIONS
1. Nasal spray Astelin twice a day for runny nose and congestion  2. Amoxicillin antibiotic 875 mg twice a day for 10 days for sinus injection  3. Recommend labs 1 week prior to next appointment in 3 months

## 2020-09-03 ENCOUNTER — TELEPHONE (OUTPATIENT)
Dept: FAMILY MEDICINE | Facility: CLINIC | Age: 85
End: 2020-09-03

## 2020-09-03 DIAGNOSIS — N18.30 STAGE 3 CHRONIC KIDNEY DISEASE: ICD-10-CM

## 2020-09-03 DIAGNOSIS — I48.0 PAROXYSMAL ATRIAL FIBRILLATION: Primary | ICD-10-CM

## 2020-09-04 ENCOUNTER — ANTI-COAG VISIT (OUTPATIENT)
Dept: CARDIOLOGY | Facility: CLINIC | Age: 85
End: 2020-09-04
Payer: MEDICARE

## 2020-09-04 DIAGNOSIS — I48.0 PAROXYSMAL ATRIAL FIBRILLATION: ICD-10-CM

## 2020-09-04 DIAGNOSIS — Z86.711 HISTORY OF PULMONARY EMBOLISM: ICD-10-CM

## 2020-09-04 DIAGNOSIS — Z79.01 LONG TERM (CURRENT) USE OF ANTICOAGULANTS: ICD-10-CM

## 2020-09-04 PROCEDURE — 93793 ANTICOAG MGMT PT WARFARIN: CPT | Mod: S$GLB,,,

## 2020-09-04 PROCEDURE — 93793 PR ANTICOAGULANT MGMT FOR PT TAKING WARFARIN: ICD-10-PCS | Mod: S$GLB,,,

## 2020-09-10 DIAGNOSIS — I48.0 PAROXYSMAL ATRIAL FIBRILLATION: ICD-10-CM

## 2020-09-10 DIAGNOSIS — Z79.01 LONG TERM (CURRENT) USE OF ANTICOAGULANTS: ICD-10-CM

## 2020-09-10 RX ORDER — WARFARIN 4 MG/1
4 TABLET ORAL DAILY
Qty: 90 TABLET | Refills: 1 | Status: SHIPPED | OUTPATIENT
Start: 2020-09-10 | End: 2020-12-07

## 2020-09-10 NOTE — TELEPHONE ENCOUNTER
----- Message from Gilma Tang sent at 9/10/2020 10:29 AM CDT -----  Contact: [phone]/Self  Type:  RX Refill Request    Who Called: Pt  Refill or New Rx:Refill   RX Name and Strength:warfarin (COUMADIN) 4 MG tablet   How is the patient currently taking it? (ex. 1XDay):Take 1 tablet (4 mg total) by mouth Daily  Is this a 30 day or 90 day RX:90  Preferred Pharmacy with phone number:[facility] [place]   Local or Mail Order:Local   Ordering Provider:Dr. Rey   Would the patient rather a call back or a response via [facility]? Call back   Best Call Back Number: [phone]  Additional Information:

## 2020-10-13 ENCOUNTER — ANTI-COAG VISIT (OUTPATIENT)
Dept: CARDIOLOGY | Facility: CLINIC | Age: 85
End: 2020-10-13
Payer: MEDICARE

## 2020-10-13 DIAGNOSIS — Z86.711 HISTORY OF PULMONARY EMBOLISM: ICD-10-CM

## 2020-10-13 DIAGNOSIS — Z79.01 LONG TERM (CURRENT) USE OF ANTICOAGULANTS: ICD-10-CM

## 2020-10-13 DIAGNOSIS — I48.0 PAROXYSMAL ATRIAL FIBRILLATION: ICD-10-CM

## 2020-10-13 PROCEDURE — 93793 ANTICOAG MGMT PT WARFARIN: CPT | Mod: S$GLB,,,

## 2020-10-13 PROCEDURE — 93793 PR ANTICOAGULANT MGMT FOR PT TAKING WARFARIN: ICD-10-PCS | Mod: S$GLB,,,

## 2020-10-13 NOTE — PROGRESS NOTES
10/13/20 called to question Zuhair he didn't know about dose on pt and if she miss any dose or about her diet, he will check with pt and contact clinic.

## 2020-10-13 NOTE — PROGRESS NOTES
INR not at goal. Medications, chart, and patient findings reviewed. See calendar for adjustments to dose and follow up plan.  As caregiver unsure of dosing pt to take 6mg 10/13 & resume maintenance dose.  Plan to re-assess in 2 weeks.

## 2020-10-26 ENCOUNTER — ANTI-COAG VISIT (OUTPATIENT)
Dept: CARDIOLOGY | Facility: CLINIC | Age: 85
End: 2020-10-26
Payer: MEDICARE

## 2020-10-26 DIAGNOSIS — Z79.01 LONG TERM (CURRENT) USE OF ANTICOAGULANTS: ICD-10-CM

## 2020-10-26 DIAGNOSIS — Z86.711 HISTORY OF PULMONARY EMBOLISM: ICD-10-CM

## 2020-10-26 DIAGNOSIS — I48.0 PAROXYSMAL ATRIAL FIBRILLATION: ICD-10-CM

## 2020-10-26 PROCEDURE — 93793 PR ANTICOAGULANT MGMT FOR PT TAKING WARFARIN: ICD-10-PCS | Mod: S$GLB,,,

## 2020-10-26 PROCEDURE — 93793 ANTICOAG MGMT PT WARFARIN: CPT | Mod: S$GLB,,,

## 2020-10-26 NOTE — PROGRESS NOTES
10/26/20 son was advised and states that he will have pt to stop the vitamin because he read that you are not to take if you are on coumadin.

## 2020-10-26 NOTE — PROGRESS NOTES
Son called to report the Patient stated to him that since 3-4 weeks ago she has been taking [employer] women's energy vitamin, can't think of any other changes that may have dropped her INR level

## 2020-10-29 PROBLEM — N18.31 STAGE 3A CHRONIC KIDNEY DISEASE: Status: ACTIVE | Noted: 2019-01-25

## 2020-10-29 RX ORDER — ALPRAZOLAM 0.5 MG/1
0.5 TABLET ORAL NIGHTLY
Qty: 30 TABLET | Refills: 2 | Status: CANCELLED | OUTPATIENT
Start: 2020-10-29

## 2020-10-29 NOTE — PROGRESS NOTES
"FAMILY MEDICINE  [facility]    CC:   Chief Complaint   Patient presents with    Follow-up       SUBJECTIVE:  Alessandra Hidalgo   is a 84 y.o. female  - with hypertension, hyperlipidemia, history of pulmonary embolism (2016) on OAC Warfarin, emphysema, depression, anxiety on chronic benzodiazepine for sleep, CKD stage 3, pAfib (followed by Dr. [facility]), mild cognitive impairment,chronic knee pain with history of knee replacement and GERD presents to follow-up    Cardiologist: Dr. [facility]  [facility]  Ortho Dr. More     She lives with her son (Zuhair) and  (Terry). She uses a rollator walker    1. Hypertension with pAfib     Current medication treatment:   metoprolol succinate (TOPROL-XL) 25 MG 24 hr tablet, Take 1 tablet (25 mg total) by mouth once daily., Disp: 90 tablet, Rfl: 0     Medication side effects: denies    Home BP cuff: none     2. Depression and Anxiety  - mild cognitive impairment 1/24/2020 MMSE 23/30     Prior treatments: Duloxetine 60 mg daily, Prozac (dizziness)  Side effects of prior treatment: lightheaded, dizziness, not effective     Current treatment:   ALPRAZolam (XANAX) 0.5 MG tablet, Take 1 tablet (0.5 mg total) by mouth nightly as needed for Anxiety., Disp: 30 tablet, Rfl: 2  - has been unable to wean off of medication  donepezil (ARICEPT) 5 MG tablet, Take 1 tablet (5 mg total) by mouth every evening., Disp: 30 tablet, Rfl: 2  - no taking because made her unable to sleep  sertraline (ZOLOFT) 50 MG tablet, Take 1 tablet (50 mg total) by mouth once daily., Disp: 90 tablet, Rfl: 1    Side effects of current treatment: denies     Symptoms related: reports well controlled but still taking Alprazolam 0.5 mg around lunch time "when I think that I need it"  Panic attacks: yes     Hopelessness: denies  Sleep: no issues and reports that sleep is "good"  Suicidal thoughts: denies  Thoughts of self harm:denies  Thoughts of harm to others: denies  History " "of suicide attempts: denies  Family history of suicide:denies     Support system: family  Counselor: none    3. Mild cognitive impairment  - Initial assessment: "Son reports difficulty with short term memory that has been present since pulmonary embolism with sudden cardiac arrest 2016 following left knee replacement. He reports that long term seem good but short term not as good. She will often ask him the same question over and over again. He does not assist with medications so unsure if she is taking constantly but she is able to state her medication and how she takes them"     Age diagnosed: 85 yo  Neurology or Neuropsychiatry evaluation: none     Last MMSE: 1/24/2020 23/30    She was started on Aricept 5 mg at bedtime last visit but reports that it made it difficult for her to sleep so she stopped the medication. Called and spoke with son and he reports that she opted not to start the medication.     Current symptoms: short term memory loss without behavioral disturbances or sleep issues  Behavioral disturbanced: none  Sleep: none    Current medication regimen:   ALPRAZolam (XANAX) 0.5 MG tablet, Take 1 tablet (0.5 mg total) by mouth nightly as needed for Anxiety., Disp: 30 tablet, Rfl: 2  donepezil (ARICEPT) 5 MG tablet, Take 1 tablet (5 mg total) by mouth every evening., Disp: 30 tablet, Rfl: 2  sertraline (ZOLOFT) 50 MG tablet, Take 1 tablet (50 mg total) by mouth once daily., Disp: 90 tablet, Rfl: 1     Living situation: son Stan) lives with her and her  and assists in care  DME: rollator      ROS: Review of Systems   Constitutional: Negative.    HENT: Negative.    Eyes: Negative.    Respiratory: Negative.    Cardiovascular: Negative.    Gastrointestinal: Negative.    Endocrine: Negative.    Genitourinary: Negative.    Musculoskeletal: Positive for arthralgias.        Otherwise negative   Skin: Negative.    Allergic/Immunologic: Negative.    Neurological: Negative.    Hematological: Negative.  "   Psychiatric/Behavioral: Negative.      Depression Patient Health Questionnaire 7/31/2020 2/27/2020 1/24/2020 8/27/2019 1/25/2019 4/11/2018   Over the last two weeks how often have you been bothered by little interest or pleasure in doing things 1 1 1 2 3 0   Over the last two weeks how often have you been bothered by feeling down, depressed or hopeless 1 1 1 1 3 0   PHQ-2 Total Score 2 2 2 3 6 0   Over the last two weeks how often have you been bothered by trouble falling or staying asleep, or sleeping too much - - - - 0 -   Over the last two weeks how often have you been bothered by feeling tired or having little energy - - - - 3 -   Over the last two weeks how often have you been bothered by a poor appetite or overeating - - - - 0 -   Over the last two weeks how often have you been bothered by feeling bad about yourself - or that you are a failure or have let yourself or your family down - - - - 0 -   Over the last two weeks how often have you been bothered by trouble concentrating on things, such as reading the newspaper or watching television - - - - 0 -   Over the last two weeks how often have you been bothered by moving or speaking so slowly that other people could have noticed. Or the opposite - being so fidgety or restless that you have been moving around a lot more than usual. - - - - 0 -   Over the last two weeks how often have you been bothered by thoughts that you would be better off dead, or of hurting yourself - - - - 0 -   If you checked off any problems, how difficult have these problems made it for you to do your work, take care of things at home or get along with other people? - - - - Very difficult -   Total Score - - - - 9 -           Past Medical History:   Diagnosis Date    Anxiety     Hyperlipidemia     Hypertension     Pulmonary embolism        Past Surgical History:   Procedure Laterality Date    APPENDECTOMY      CATARACT EXTRACTION W/ INTRAOCULAR LENS  IMPLANT, BILATERAL       HYSTERECTOMY      Partial     LEFT KNEE REPLACEMENT         Family History   Problem Relation Age of Onset    Cancer Mother     Stroke Daughter     Heart disease Neg Hx        Social History     Tobacco Use    Smoking status: Former Smoker     Packs/day: 0.00     Years: 4.00     Pack years: 0.00     Types: Cigarettes    Smokeless tobacco: Former User   Substance Use Topics    Alcohol use: Yes     Alcohol/week: 1.0 standard drinks     Types: 1 Glasses of wine per week    Drug use: No       Social History     Social History Narrative    Lives with her  and son Zuhair moved in 2019 to assist with care. They have 4 children. She is originally from [place]. She is sedentary. She is a practicing Taoism and she participates in [employer]. DME: rollator walker. Tub bench       ALLERGIES:   Review of patient's allergies indicates:   Allergen Reactions    Prozac [fluoxetine]      dizziness       MEDS:   Current Outpatient Medications:     ALPRAZolam (XANAX) 0.5 MG tablet, Take 1 tablet (0.5 mg total) by mouth nightly., Disp: 30 tablet, Rfl: 2    azelastine (ASTELIN) 137 mcg (0.1 %) nasal spray, 1 spray (137 mcg total) by Nasal route 2 (two) times daily., Disp: 30 mL, Rfl: 0    metoprolol succinate (TOPROL-XL) 25 MG 24 hr tablet, TAKE 1 TABLET BY MOUTH ONCE DAILY , Disp: 90 tablet, Rfl: 1    omeprazole (PRILOSEC) 40 MG capsule, TAKE 1 CAPSULE BY MOUTH ONCE DAILY , Disp: 90 capsule, Rfl: 3    rosuvastatin (CRESTOR) 10 MG tablet, TAKE 1 TABLET BY MOUTH ONCE DAILY FOR CHOLESTEROL., Disp: 90 tablet, Rfl: 3    sertraline (ZOLOFT) 50 MG tablet, Take 1 tablet (50 mg total) by mouth once daily., Disp: 90 tablet, Rfl: 1    traMADoL (ULTRAM) 50 mg tablet, Take 50 mg by mouth. , Disp: , Rfl:     warfarin (COUMADIN) 4 MG tablet, Take 1 tablet (4 mg total) by mouth Daily., Disp: 90 tablet, Rfl: 1    OBJECTIVE:   Vitals:    11/02/20 0929   BP: 124/68   Pulse: 66   SpO2: 98%   Weight: 79.8 kg (176 lb)  "  Height: 5' 3" (1.6 m)     Body mass index is 31.18 kg/m².    Physical Exam  Constitutional:       General: She is not in acute distress.  Neck:      Musculoskeletal: Neck supple.   Cardiovascular:      Rate and Rhythm: Normal rate and regular rhythm.      Pulses: Normal pulses.      Heart sounds: Normal heart sounds. No murmur. No friction rub. No gallop.    Pulmonary:      Effort: Pulmonary effort is normal.      Breath sounds: Normal breath sounds. No wheezing or rhonchi.   Musculoskeletal:      Right lower leg: No edema.      Left lower leg: No edema.   Neurological:      Mental Status: She is alert.           PERTINENT RESULTS:   BMP  Lab Results   Component Value Date     10/26/2020    K 4.3 10/26/2020     10/26/2020    CO2 29 10/26/2020    BUN 18 (H) 10/26/2020    CREATININE 1.05 10/26/2020    CALCIUM 9.9 10/26/2020    ANIONGAP 8 10/26/2020    ESTGFRAFRICA 57.2 (A) 10/26/2020    EGFRNONAA 49.6 (A) 10/26/2020     Lab Results   Component Value Date    WBC 7.85 11/18/2019    HGB 12.8 11/18/2019    HCT 39.9 11/18/2019    MCV 93 11/18/2019     11/18/2019       Lab Results   Component Value Date    CALCIUM 9.9 10/26/2020     Lab Results   Component Value Date    INR 1.7 (H) 10/26/2020    INR 1.6 (H) 10/13/2020    INR 1.9 (H) 09/01/2020       ASSESSMENT/PLAN:  Problem List Items Addressed This Visit        Neuro    Mild cognitive impairment    Overview     - 1/24/2020 MMSE 23  - trial Aricept and pt did to not tolerate  - patient declined medication and family is supportive  - supportive care  - counseling with anticipatory guidance to rogelio Moffett            Psychiatric    Generalized anxiety disorder    Overview     - with benzodiazepine dependence  - continue Sertraline 50 mg daily         Recurrent major depressive disorder, in full remission    Overview     - well controlled  - continue current medication            Cardiac/Vascular    Essential hypertension - Primary (Chronic)    Overview    "  - well controlled  - continue current medication         Relevant Orders    Basic Metabolic Panel    Hypercholesterolemia    Overview     Lab Results   Component Value Date    LDLCALC 93.4 09/01/2020     - well controlled  - continue current medication         Paroxysmal atrial fibrillation    Overview     - followed by Dr. [facility]  - OAC warfarin  - rate controlled with BB            Renal/    Stage 3a chronic kidney disease    Overview     Lab Results   Component Value Date    CREATININE 1.05 10/26/2020     - stable         Relevant Orders    Basic Metabolic Panel       Hematology    History of pulmonary embolism (Chronic)    Overview     - 2016 s/p knee surgery with cardiac arrest  - on OAC Warfarin          Long term (current) use of anticoagulants    Overview     - OAC Warfarin for pAFib and history of pulmonary embolism  - INR goal 2-3 and followed by [facility]         Relevant Orders    CBC Auto Differential          ORDERS:   Orders Placed This Encounter    Basic Metabolic Panel    CBC Auto Differential     Vaccines recommended: Shingles#2 and flu    Follow-up 4 months with labs or sooner if any concerns.    Dr. Milli Rey D.O.   Family Medicine

## 2020-11-02 ENCOUNTER — OFFICE VISIT (OUTPATIENT)
Dept: FAMILY MEDICINE | Facility: CLINIC | Age: 85
End: 2020-11-02
Payer: MEDICARE

## 2020-11-02 VITALS
SYSTOLIC BLOOD PRESSURE: 124 MMHG | DIASTOLIC BLOOD PRESSURE: 68 MMHG | HEART RATE: 66 BPM | WEIGHT: 176 LBS | BODY MASS INDEX: 31.18 KG/M2 | OXYGEN SATURATION: 98 % | HEIGHT: 63 IN

## 2020-11-02 DIAGNOSIS — F33.42 RECURRENT MAJOR DEPRESSIVE DISORDER, IN FULL REMISSION: ICD-10-CM

## 2020-11-02 DIAGNOSIS — I10 ESSENTIAL HYPERTENSION: Primary | Chronic | ICD-10-CM

## 2020-11-02 DIAGNOSIS — F41.1 GENERALIZED ANXIETY DISORDER: ICD-10-CM

## 2020-11-02 DIAGNOSIS — Z79.01 LONG TERM (CURRENT) USE OF ANTICOAGULANTS: ICD-10-CM

## 2020-11-02 DIAGNOSIS — I48.0 PAROXYSMAL ATRIAL FIBRILLATION: ICD-10-CM

## 2020-11-02 DIAGNOSIS — E78.00 HYPERCHOLESTEROLEMIA: ICD-10-CM

## 2020-11-02 DIAGNOSIS — Z86.711 HISTORY OF PULMONARY EMBOLISM: Chronic | ICD-10-CM

## 2020-11-02 DIAGNOSIS — N18.31 STAGE 3A CHRONIC KIDNEY DISEASE: ICD-10-CM

## 2020-11-02 DIAGNOSIS — G31.84 MILD COGNITIVE IMPAIRMENT: ICD-10-CM

## 2020-11-02 PROCEDURE — 1125F PR PAIN SEVERITY QUANTIFIED, PAIN PRESENT: ICD-10-PCS | Mod: HCNC,S$GLB,, | Performed by: FAMILY MEDICINE

## 2020-11-02 PROCEDURE — 99214 PR OFFICE/OUTPT VISIT, EST, LEVL IV, 30-39 MIN: ICD-10-PCS | Mod: 25,HCNC,S$GLB, | Performed by: FAMILY MEDICINE

## 2020-11-02 PROCEDURE — 1125F AMNT PAIN NOTED PAIN PRSNT: CPT | Mod: HCNC,S$GLB,, | Performed by: FAMILY MEDICINE

## 2020-11-02 PROCEDURE — 1101F PR PT FALLS ASSESS DOC 0-1 FALLS W/OUT INJ PAST YR: ICD-10-PCS | Mod: HCNC,CPTII,S$GLB, | Performed by: FAMILY MEDICINE

## 2020-11-02 PROCEDURE — 3074F PR MOST RECENT SYSTOLIC BLOOD PRESSURE < 130 MM HG: ICD-10-PCS | Mod: HCNC,CPTII,S$GLB, | Performed by: FAMILY MEDICINE

## 2020-11-02 PROCEDURE — 1159F MED LIST DOCD IN RCRD: CPT | Mod: HCNC,S$GLB,, | Performed by: FAMILY MEDICINE

## 2020-11-02 PROCEDURE — 99999 PR PBB SHADOW E&M-EST. PATIENT-LVL IV: CPT | Mod: PBBFAC,HCNC,, | Performed by: FAMILY MEDICINE

## 2020-11-02 PROCEDURE — 1101F PT FALLS ASSESS-DOCD LE1/YR: CPT | Mod: HCNC,CPTII,S$GLB, | Performed by: FAMILY MEDICINE

## 2020-11-02 PROCEDURE — 90694 FLU VACCINE - QUADRIVALENT - ADJUVANTED: ICD-10-PCS | Mod: HCNC,S$GLB,, | Performed by: FAMILY MEDICINE

## 2020-11-02 PROCEDURE — 3074F SYST BP LT 130 MM HG: CPT | Mod: HCNC,CPTII,S$GLB, | Performed by: FAMILY MEDICINE

## 2020-11-02 PROCEDURE — G0008 ADMIN INFLUENZA VIRUS VAC: HCPCS | Mod: HCNC,S$GLB,, | Performed by: FAMILY MEDICINE

## 2020-11-02 PROCEDURE — 99499 RISK ADDL DX/OHS AUDIT: ICD-10-PCS | Mod: S$GLB,,, | Performed by: FAMILY MEDICINE

## 2020-11-02 PROCEDURE — 1159F PR MEDICATION LIST DOCUMENTED IN MEDICAL RECORD: ICD-10-PCS | Mod: HCNC,S$GLB,, | Performed by: FAMILY MEDICINE

## 2020-11-02 PROCEDURE — 99214 OFFICE O/P EST MOD 30 MIN: CPT | Mod: 25,HCNC,S$GLB, | Performed by: FAMILY MEDICINE

## 2020-11-02 PROCEDURE — 99999 PR PBB SHADOW E&M-EST. PATIENT-LVL IV: ICD-10-PCS | Mod: PBBFAC,HCNC,, | Performed by: FAMILY MEDICINE

## 2020-11-02 PROCEDURE — 99499 UNLISTED E&M SERVICE: CPT | Mod: S$GLB,,, | Performed by: FAMILY MEDICINE

## 2020-11-02 PROCEDURE — G0008 FLU VACCINE - QUADRIVALENT - ADJUVANTED: ICD-10-PCS | Mod: HCNC,S$GLB,, | Performed by: FAMILY MEDICINE

## 2020-11-02 PROCEDURE — 90694 VACC AIIV4 NO PRSRV 0.5ML IM: CPT | Mod: HCNC,S$GLB,, | Performed by: FAMILY MEDICINE

## 2020-11-02 PROCEDURE — 3078F DIAST BP <80 MM HG: CPT | Mod: HCNC,CPTII,S$GLB, | Performed by: FAMILY MEDICINE

## 2020-11-02 PROCEDURE — 3078F PR MOST RECENT DIASTOLIC BLOOD PRESSURE < 80 MM HG: ICD-10-PCS | Mod: HCNC,CPTII,S$GLB, | Performed by: FAMILY MEDICINE

## 2020-11-02 RX ORDER — TRAMADOL HYDROCHLORIDE 50 MG/1
50 TABLET ORAL
COMMUNITY
Start: 2020-10-15 | End: 2021-07-07

## 2020-11-09 ENCOUNTER — ANTI-COAG VISIT (OUTPATIENT)
Dept: CARDIOLOGY | Facility: CLINIC | Age: 85
End: 2020-11-09
Payer: MEDICARE

## 2020-11-09 DIAGNOSIS — Z86.711 HISTORY OF PULMONARY EMBOLISM: ICD-10-CM

## 2020-11-09 DIAGNOSIS — I48.0 PAROXYSMAL ATRIAL FIBRILLATION: ICD-10-CM

## 2020-11-09 DIAGNOSIS — Z79.01 LONG TERM (CURRENT) USE OF ANTICOAGULANTS: ICD-10-CM

## 2020-11-09 PROCEDURE — 93793 ANTICOAG MGMT PT WARFARIN: CPT | Mod: S$GLB,,,

## 2020-11-09 PROCEDURE — 93793 PR ANTICOAGULANT MGMT FOR PT TAKING WARFARIN: ICD-10-PCS | Mod: S$GLB,,,

## 2020-11-16 ENCOUNTER — ANTI-COAG VISIT (OUTPATIENT)
Dept: CARDIOLOGY | Facility: CLINIC | Age: 85
End: 2020-11-16
Payer: MEDICARE

## 2020-11-16 DIAGNOSIS — Z79.01 LONG TERM (CURRENT) USE OF ANTICOAGULANTS: ICD-10-CM

## 2020-11-16 DIAGNOSIS — I48.0 PAROXYSMAL ATRIAL FIBRILLATION: ICD-10-CM

## 2020-11-16 DIAGNOSIS — Z86.711 HISTORY OF PULMONARY EMBOLISM: ICD-10-CM

## 2020-11-16 PROCEDURE — 93793 ANTICOAG MGMT PT WARFARIN: CPT | Mod: S$GLB,,,

## 2020-11-16 PROCEDURE — 93793 PR ANTICOAGULANT MGMT FOR PT TAKING WARFARIN: ICD-10-PCS | Mod: S$GLB,,,

## 2020-11-30 ENCOUNTER — ANTI-COAG VISIT (OUTPATIENT)
Dept: CARDIOLOGY | Facility: CLINIC | Age: 85
End: 2020-11-30
Payer: MEDICARE

## 2020-11-30 DIAGNOSIS — Z86.711 HISTORY OF PULMONARY EMBOLISM: ICD-10-CM

## 2020-11-30 DIAGNOSIS — Z79.01 LONG TERM (CURRENT) USE OF ANTICOAGULANTS: ICD-10-CM

## 2020-11-30 DIAGNOSIS — I48.0 PAROXYSMAL ATRIAL FIBRILLATION: ICD-10-CM

## 2020-11-30 PROCEDURE — 93793 ANTICOAG MGMT PT WARFARIN: CPT | Mod: S$GLB,,,

## 2020-11-30 PROCEDURE — 93793 PR ANTICOAGULANT MGMT FOR PT TAKING WARFARIN: ICD-10-PCS | Mod: S$GLB,,,

## 2020-12-15 ENCOUNTER — ANTI-COAG VISIT (OUTPATIENT)
Dept: CARDIOLOGY | Facility: CLINIC | Age: 85
End: 2020-12-15
Payer: MEDICARE

## 2020-12-15 DIAGNOSIS — I48.0 PAROXYSMAL ATRIAL FIBRILLATION: ICD-10-CM

## 2020-12-15 DIAGNOSIS — Z79.01 LONG TERM (CURRENT) USE OF ANTICOAGULANTS: ICD-10-CM

## 2020-12-15 DIAGNOSIS — Z86.711 HISTORY OF PULMONARY EMBOLISM: ICD-10-CM

## 2020-12-15 PROCEDURE — 93793 PR ANTICOAGULANT MGMT FOR PT TAKING WARFARIN: ICD-10-PCS | Mod: S$GLB,,,

## 2020-12-15 PROCEDURE — 93793 ANTICOAG MGMT PT WARFARIN: CPT | Mod: S$GLB,,,

## 2021-01-06 ENCOUNTER — ANTI-COAG VISIT (OUTPATIENT)
Dept: CARDIOLOGY | Facility: CLINIC | Age: 86
End: 2021-01-06
Payer: MEDICARE

## 2021-01-06 DIAGNOSIS — Z86.711 HISTORY OF PULMONARY EMBOLISM: ICD-10-CM

## 2021-01-06 DIAGNOSIS — I48.0 PAROXYSMAL ATRIAL FIBRILLATION: ICD-10-CM

## 2021-01-06 DIAGNOSIS — Z79.01 LONG TERM (CURRENT) USE OF ANTICOAGULANTS: ICD-10-CM

## 2021-01-06 PROCEDURE — 93793 ANTICOAG MGMT PT WARFARIN: CPT | Mod: S$GLB,,,

## 2021-01-06 PROCEDURE — 93793 PR ANTICOAGULANT MGMT FOR PT TAKING WARFARIN: ICD-10-PCS | Mod: S$GLB,,,

## 2021-01-15 ENCOUNTER — TELEPHONE (OUTPATIENT)
Dept: FAMILY MEDICINE | Facility: CLINIC | Age: 86
End: 2021-01-15

## 2021-01-16 ENCOUNTER — IMMUNIZATION (OUTPATIENT)
Dept: FAMILY MEDICINE | Facility: CLINIC | Age: 86
End: 2021-01-16
Payer: MEDICARE

## 2021-01-16 DIAGNOSIS — Z23 NEED FOR VACCINATION: Primary | ICD-10-CM

## 2021-01-16 PROCEDURE — 91300 COVID-19, MRNA, LNP-S, PF, 30 MCG/0.3 ML DOSE VACCINE: CPT | Mod: PBBFAC | Performed by: FAMILY MEDICINE

## 2021-01-28 ENCOUNTER — ANTI-COAG VISIT (OUTPATIENT)
Dept: CARDIOLOGY | Facility: CLINIC | Age: 86
End: 2021-01-28
Payer: MEDICARE

## 2021-01-28 DIAGNOSIS — Z86.711 HISTORY OF PULMONARY EMBOLISM: ICD-10-CM

## 2021-01-28 DIAGNOSIS — Z79.01 LONG TERM (CURRENT) USE OF ANTICOAGULANTS: Primary | ICD-10-CM

## 2021-01-28 DIAGNOSIS — I48.0 PAROXYSMAL ATRIAL FIBRILLATION: ICD-10-CM

## 2021-01-28 PROCEDURE — 93793 ANTICOAG MGMT PT WARFARIN: CPT | Mod: S$GLB,,,

## 2021-01-28 PROCEDURE — 93793 PR ANTICOAGULANT MGMT FOR PT TAKING WARFARIN: ICD-10-PCS | Mod: S$GLB,,,

## 2021-02-06 ENCOUNTER — IMMUNIZATION (OUTPATIENT)
Dept: FAMILY MEDICINE | Facility: CLINIC | Age: 86
End: 2021-02-06
Payer: MEDICARE

## 2021-02-06 DIAGNOSIS — Z23 NEED FOR VACCINATION: Primary | ICD-10-CM

## 2021-02-06 PROCEDURE — 0002A COVID-19, MRNA, LNP-S, PF, 30 MCG/0.3 ML DOSE VACCINE: CPT | Mod: PBBFAC | Performed by: FAMILY MEDICINE

## 2021-02-06 PROCEDURE — 91300 COVID-19, MRNA, LNP-S, PF, 30 MCG/0.3 ML DOSE VACCINE: CPT | Mod: PBBFAC | Performed by: FAMILY MEDICINE

## 2021-02-11 ENCOUNTER — ANTI-COAG VISIT (OUTPATIENT)
Dept: CARDIOLOGY | Facility: CLINIC | Age: 86
End: 2021-02-11
Payer: MEDICARE

## 2021-02-11 DIAGNOSIS — Z79.01 LONG TERM (CURRENT) USE OF ANTICOAGULANTS: Primary | ICD-10-CM

## 2021-02-11 DIAGNOSIS — Z86.711 HISTORY OF PULMONARY EMBOLISM: ICD-10-CM

## 2021-02-11 DIAGNOSIS — I48.0 PAROXYSMAL ATRIAL FIBRILLATION: ICD-10-CM

## 2021-02-11 PROCEDURE — 93793 ANTICOAG MGMT PT WARFARIN: CPT | Mod: S$GLB,,,

## 2021-02-11 PROCEDURE — 93793 PR ANTICOAGULANT MGMT FOR PT TAKING WARFARIN: ICD-10-PCS | Mod: S$GLB,,,

## 2021-02-19 ENCOUNTER — NURSE TRIAGE (OUTPATIENT)
Dept: ADMINISTRATIVE | Facility: CLINIC | Age: 86
End: 2021-02-19

## 2021-02-21 DIAGNOSIS — Z79.01 LONG TERM (CURRENT) USE OF ANTICOAGULANTS: ICD-10-CM

## 2021-02-21 DIAGNOSIS — I48.0 PAROXYSMAL ATRIAL FIBRILLATION: ICD-10-CM

## 2021-02-22 RX ORDER — WARFARIN 4 MG/1
TABLET ORAL
Qty: 90 TABLET | Refills: 1 | Status: SHIPPED | OUTPATIENT
Start: 2021-02-22 | End: 2021-07-13

## 2021-02-25 ENCOUNTER — ANTI-COAG VISIT (OUTPATIENT)
Dept: CARDIOLOGY | Facility: CLINIC | Age: 86
End: 2021-02-25
Payer: MEDICARE

## 2021-02-25 DIAGNOSIS — I48.0 PAROXYSMAL ATRIAL FIBRILLATION: ICD-10-CM

## 2021-02-25 DIAGNOSIS — Z79.01 LONG TERM (CURRENT) USE OF ANTICOAGULANTS: Primary | ICD-10-CM

## 2021-02-25 DIAGNOSIS — Z86.711 HISTORY OF PULMONARY EMBOLISM: ICD-10-CM

## 2021-02-25 PROCEDURE — 93793 ANTICOAG MGMT PT WARFARIN: CPT | Mod: S$GLB,,,

## 2021-02-25 PROCEDURE — 93793 PR ANTICOAGULANT MGMT FOR PT TAKING WARFARIN: ICD-10-PCS | Mod: S$GLB,,,

## 2021-03-02 ENCOUNTER — ANTI-COAG VISIT (OUTPATIENT)
Dept: CARDIOLOGY | Facility: CLINIC | Age: 86
End: 2021-03-02
Payer: MEDICARE

## 2021-03-02 DIAGNOSIS — Z79.01 LONG TERM (CURRENT) USE OF ANTICOAGULANTS: Primary | ICD-10-CM

## 2021-03-02 DIAGNOSIS — Z86.711 HISTORY OF PULMONARY EMBOLISM: ICD-10-CM

## 2021-03-02 DIAGNOSIS — I48.0 PAROXYSMAL ATRIAL FIBRILLATION: ICD-10-CM

## 2021-03-02 PROCEDURE — 93793 ANTICOAG MGMT PT WARFARIN: CPT | Mod: S$GLB,,,

## 2021-03-02 PROCEDURE — 93793 PR ANTICOAGULANT MGMT FOR PT TAKING WARFARIN: ICD-10-PCS | Mod: S$GLB,,,

## 2021-03-04 ENCOUNTER — OFFICE VISIT (OUTPATIENT)
Dept: FAMILY MEDICINE | Facility: CLINIC | Age: 86
End: 2021-03-04
Payer: MEDICARE

## 2021-03-04 VITALS
TEMPERATURE: 98 F | DIASTOLIC BLOOD PRESSURE: 70 MMHG | HEART RATE: 60 BPM | HEIGHT: 63 IN | OXYGEN SATURATION: 96 % | RESPIRATION RATE: 18 BRPM | SYSTOLIC BLOOD PRESSURE: 130 MMHG | BODY MASS INDEX: 30.27 KG/M2 | WEIGHT: 170.81 LBS

## 2021-03-04 DIAGNOSIS — N18.31 STAGE 3A CHRONIC KIDNEY DISEASE: ICD-10-CM

## 2021-03-04 DIAGNOSIS — F13.20 BENZODIAZEPINE DEPENDENCE: ICD-10-CM

## 2021-03-04 DIAGNOSIS — F33.42 RECURRENT MAJOR DEPRESSIVE DISORDER, IN FULL REMISSION: ICD-10-CM

## 2021-03-04 DIAGNOSIS — F41.1 GENERALIZED ANXIETY DISORDER: ICD-10-CM

## 2021-03-04 DIAGNOSIS — I48.0 PAROXYSMAL ATRIAL FIBRILLATION: Primary | ICD-10-CM

## 2021-03-04 DIAGNOSIS — Z86.711 HISTORY OF PULMONARY EMBOLISM: Chronic | ICD-10-CM

## 2021-03-04 DIAGNOSIS — E78.00 HYPERCHOLESTEROLEMIA: ICD-10-CM

## 2021-03-04 DIAGNOSIS — I10 ESSENTIAL HYPERTENSION: Chronic | ICD-10-CM

## 2021-03-04 DIAGNOSIS — Z79.01 LONG TERM (CURRENT) USE OF ANTICOAGULANTS: ICD-10-CM

## 2021-03-04 DIAGNOSIS — G31.84 MILD COGNITIVE IMPAIRMENT: ICD-10-CM

## 2021-03-04 DIAGNOSIS — Z78.0 POSTMENOPAUSAL: ICD-10-CM

## 2021-03-04 PROCEDURE — 1159F PR MEDICATION LIST DOCUMENTED IN MEDICAL RECORD: ICD-10-PCS | Mod: S$GLB,,, | Performed by: FAMILY MEDICINE

## 2021-03-04 PROCEDURE — G0009 ADMIN PNEUMOCOCCAL VACCINE: HCPCS | Mod: S$GLB,,, | Performed by: FAMILY MEDICINE

## 2021-03-04 PROCEDURE — 99214 PR OFFICE/OUTPT VISIT, EST, LEVL IV, 30-39 MIN: ICD-10-PCS | Mod: 25,S$GLB,, | Performed by: FAMILY MEDICINE

## 2021-03-04 PROCEDURE — 1101F PR PT FALLS ASSESS DOC 0-1 FALLS W/OUT INJ PAST YR: ICD-10-PCS | Mod: CPTII,S$GLB,, | Performed by: FAMILY MEDICINE

## 2021-03-04 PROCEDURE — 90732 PPSV23 VACC 2 YRS+ SUBQ/IM: CPT | Mod: S$GLB,,, | Performed by: FAMILY MEDICINE

## 2021-03-04 PROCEDURE — 90732 PNEUMOCOCCAL POLYSACCHARIDE VACCINE 23-VALENT =>2YO SQ IM: ICD-10-PCS | Mod: S$GLB,,, | Performed by: FAMILY MEDICINE

## 2021-03-04 PROCEDURE — 3078F PR MOST RECENT DIASTOLIC BLOOD PRESSURE < 80 MM HG: ICD-10-PCS | Mod: CPTII,S$GLB,, | Performed by: FAMILY MEDICINE

## 2021-03-04 PROCEDURE — 3288F FALL RISK ASSESSMENT DOCD: CPT | Mod: CPTII,S$GLB,, | Performed by: FAMILY MEDICINE

## 2021-03-04 PROCEDURE — G0009 PNEUMOCOCCAL POLYSACCHARIDE VACCINE 23-VALENT =>2YO SQ IM: ICD-10-PCS | Mod: S$GLB,,, | Performed by: FAMILY MEDICINE

## 2021-03-04 PROCEDURE — 99999 PR PBB SHADOW E&M-EST. PATIENT-LVL IV: ICD-10-PCS | Mod: PBBFAC,,, | Performed by: FAMILY MEDICINE

## 2021-03-04 PROCEDURE — 99499 UNLISTED E&M SERVICE: CPT | Mod: S$GLB,,, | Performed by: FAMILY MEDICINE

## 2021-03-04 PROCEDURE — 99499 RISK ADDL DX/OHS AUDIT: ICD-10-PCS | Mod: S$GLB,,, | Performed by: FAMILY MEDICINE

## 2021-03-04 PROCEDURE — 99999 PR PBB SHADOW E&M-EST. PATIENT-LVL IV: CPT | Mod: PBBFAC,,, | Performed by: FAMILY MEDICINE

## 2021-03-04 PROCEDURE — 3075F SYST BP GE 130 - 139MM HG: CPT | Mod: CPTII,S$GLB,, | Performed by: FAMILY MEDICINE

## 2021-03-04 PROCEDURE — 1159F MED LIST DOCD IN RCRD: CPT | Mod: S$GLB,,, | Performed by: FAMILY MEDICINE

## 2021-03-04 PROCEDURE — 99214 OFFICE O/P EST MOD 30 MIN: CPT | Mod: 25,S$GLB,, | Performed by: FAMILY MEDICINE

## 2021-03-04 PROCEDURE — 1101F PT FALLS ASSESS-DOCD LE1/YR: CPT | Mod: CPTII,S$GLB,, | Performed by: FAMILY MEDICINE

## 2021-03-04 PROCEDURE — 1125F PR PAIN SEVERITY QUANTIFIED, PAIN PRESENT: ICD-10-PCS | Mod: S$GLB,,, | Performed by: FAMILY MEDICINE

## 2021-03-04 PROCEDURE — 3078F DIAST BP <80 MM HG: CPT | Mod: CPTII,S$GLB,, | Performed by: FAMILY MEDICINE

## 2021-03-04 PROCEDURE — 3075F PR MOST RECENT SYSTOLIC BLOOD PRESS GE 130-139MM HG: ICD-10-PCS | Mod: CPTII,S$GLB,, | Performed by: FAMILY MEDICINE

## 2021-03-04 PROCEDURE — 3288F PR FALLS RISK ASSESSMENT DOCUMENTED: ICD-10-PCS | Mod: CPTII,S$GLB,, | Performed by: FAMILY MEDICINE

## 2021-03-04 PROCEDURE — 1125F AMNT PAIN NOTED PAIN PRSNT: CPT | Mod: S$GLB,,, | Performed by: FAMILY MEDICINE

## 2021-03-04 RX ORDER — ZOSTER VACCINE RECOMBINANT, ADJUVANTED 50 MCG/0.5
KIT INTRAMUSCULAR
Qty: 1 EACH | Refills: 1 | Status: CANCELLED | OUTPATIENT
Start: 2021-03-04

## 2021-03-04 RX ORDER — ALPRAZOLAM 0.5 MG/1
0.5 TABLET ORAL NIGHTLY
Qty: 30 TABLET | Refills: 3 | Status: CANCELLED | OUTPATIENT
Start: 2021-03-04

## 2021-03-08 ENCOUNTER — PATIENT MESSAGE (OUTPATIENT)
Dept: FAMILY MEDICINE | Facility: CLINIC | Age: 86
End: 2021-03-08

## 2021-03-08 DIAGNOSIS — M85.89 OSTEOPENIA OF MULTIPLE SITES: ICD-10-CM

## 2021-03-16 ENCOUNTER — ANTI-COAG VISIT (OUTPATIENT)
Dept: CARDIOLOGY | Facility: CLINIC | Age: 86
End: 2021-03-16
Payer: MEDICARE

## 2021-03-16 DIAGNOSIS — I48.0 PAROXYSMAL ATRIAL FIBRILLATION: ICD-10-CM

## 2021-03-16 DIAGNOSIS — Z79.01 LONG TERM (CURRENT) USE OF ANTICOAGULANTS: Primary | ICD-10-CM

## 2021-03-16 DIAGNOSIS — Z86.711 HISTORY OF PULMONARY EMBOLISM: ICD-10-CM

## 2021-03-16 PROCEDURE — 93793 ANTICOAG MGMT PT WARFARIN: CPT | Mod: S$GLB,,,

## 2021-03-16 PROCEDURE — 93793 PR ANTICOAGULANT MGMT FOR PT TAKING WARFARIN: ICD-10-PCS | Mod: S$GLB,,,

## 2021-03-31 ENCOUNTER — ANTI-COAG VISIT (OUTPATIENT)
Dept: CARDIOLOGY | Facility: CLINIC | Age: 86
End: 2021-03-31
Payer: MEDICARE

## 2021-03-31 DIAGNOSIS — Z79.01 LONG TERM (CURRENT) USE OF ANTICOAGULANTS: Primary | ICD-10-CM

## 2021-03-31 DIAGNOSIS — Z86.711 HISTORY OF PULMONARY EMBOLISM: ICD-10-CM

## 2021-03-31 DIAGNOSIS — I48.0 PAROXYSMAL ATRIAL FIBRILLATION: ICD-10-CM

## 2021-03-31 PROCEDURE — 93793 PR ANTICOAGULANT MGMT FOR PT TAKING WARFARIN: ICD-10-PCS | Mod: S$GLB,,,

## 2021-03-31 PROCEDURE — 93793 ANTICOAG MGMT PT WARFARIN: CPT | Mod: S$GLB,,,

## 2021-04-20 ENCOUNTER — ANTI-COAG VISIT (OUTPATIENT)
Dept: CARDIOLOGY | Facility: CLINIC | Age: 86
End: 2021-04-20
Payer: MEDICARE

## 2021-04-20 DIAGNOSIS — I48.0 PAROXYSMAL ATRIAL FIBRILLATION: ICD-10-CM

## 2021-04-20 DIAGNOSIS — Z86.711 HISTORY OF PULMONARY EMBOLISM: ICD-10-CM

## 2021-04-20 DIAGNOSIS — Z79.01 LONG TERM (CURRENT) USE OF ANTICOAGULANTS: Primary | ICD-10-CM

## 2021-04-20 PROCEDURE — 93793 PR ANTICOAGULANT MGMT FOR PT TAKING WARFARIN: ICD-10-PCS | Mod: S$GLB,,,

## 2021-04-20 PROCEDURE — 93793 ANTICOAG MGMT PT WARFARIN: CPT | Mod: S$GLB,,,

## 2021-05-11 ENCOUNTER — ANTI-COAG VISIT (OUTPATIENT)
Dept: CARDIOLOGY | Facility: CLINIC | Age: 86
End: 2021-05-11
Payer: MEDICARE

## 2021-05-11 DIAGNOSIS — Z86.711 HISTORY OF PULMONARY EMBOLISM: ICD-10-CM

## 2021-05-11 DIAGNOSIS — Z79.01 LONG TERM (CURRENT) USE OF ANTICOAGULANTS: Primary | ICD-10-CM

## 2021-05-11 DIAGNOSIS — I48.0 PAROXYSMAL ATRIAL FIBRILLATION: ICD-10-CM

## 2021-05-11 PROCEDURE — 93793 PR ANTICOAGULANT MGMT FOR PT TAKING WARFARIN: ICD-10-PCS | Mod: S$GLB,,,

## 2021-05-11 PROCEDURE — 93793 ANTICOAG MGMT PT WARFARIN: CPT | Mod: S$GLB,,,

## 2021-06-01 ENCOUNTER — ANTI-COAG VISIT (OUTPATIENT)
Dept: CARDIOLOGY | Facility: CLINIC | Age: 86
End: 2021-06-01
Payer: MEDICARE

## 2021-06-01 DIAGNOSIS — Z79.01 LONG TERM (CURRENT) USE OF ANTICOAGULANTS: Primary | ICD-10-CM

## 2021-06-01 DIAGNOSIS — I48.0 PAROXYSMAL ATRIAL FIBRILLATION: ICD-10-CM

## 2021-06-01 DIAGNOSIS — Z86.711 HISTORY OF PULMONARY EMBOLISM: ICD-10-CM

## 2021-06-01 PROCEDURE — 93793 ANTICOAG MGMT PT WARFARIN: CPT | Mod: S$GLB,,,

## 2021-06-01 PROCEDURE — 93793 PR ANTICOAGULANT MGMT FOR PT TAKING WARFARIN: ICD-10-PCS | Mod: S$GLB,,,

## 2021-07-01 ENCOUNTER — ANTI-COAG VISIT (OUTPATIENT)
Dept: CARDIOLOGY | Facility: CLINIC | Age: 86
End: 2021-07-01
Payer: MEDICARE

## 2021-07-01 DIAGNOSIS — Z79.01 LONG TERM (CURRENT) USE OF ANTICOAGULANTS: Primary | ICD-10-CM

## 2021-07-01 DIAGNOSIS — Z86.711 HISTORY OF PULMONARY EMBOLISM: ICD-10-CM

## 2021-07-01 DIAGNOSIS — I48.0 PAROXYSMAL ATRIAL FIBRILLATION: ICD-10-CM

## 2021-07-01 PROCEDURE — 93793 PR ANTICOAGULANT MGMT FOR PT TAKING WARFARIN: ICD-10-PCS | Mod: S$GLB,,,

## 2021-07-01 PROCEDURE — 93793 ANTICOAG MGMT PT WARFARIN: CPT | Mod: S$GLB,,,

## 2021-07-07 ENCOUNTER — ANTI-COAG VISIT (OUTPATIENT)
Dept: CARDIOLOGY | Facility: CLINIC | Age: 86
End: 2021-07-07
Payer: MEDICARE

## 2021-07-07 ENCOUNTER — TELEPHONE (OUTPATIENT)
Dept: FAMILY MEDICINE | Facility: CLINIC | Age: 86
End: 2021-07-07

## 2021-07-07 ENCOUNTER — OFFICE VISIT (OUTPATIENT)
Dept: FAMILY MEDICINE | Facility: CLINIC | Age: 86
End: 2021-07-07
Payer: MEDICARE

## 2021-07-07 VITALS
WEIGHT: 169.13 LBS | OXYGEN SATURATION: 95 % | SYSTOLIC BLOOD PRESSURE: 130 MMHG | BODY MASS INDEX: 29.97 KG/M2 | HEART RATE: 62 BPM | HEIGHT: 63 IN | DIASTOLIC BLOOD PRESSURE: 70 MMHG | RESPIRATION RATE: 18 BRPM

## 2021-07-07 DIAGNOSIS — I10 ESSENTIAL HYPERTENSION: Primary | Chronic | ICD-10-CM

## 2021-07-07 DIAGNOSIS — I48.0 PAROXYSMAL ATRIAL FIBRILLATION: ICD-10-CM

## 2021-07-07 DIAGNOSIS — Z86.711 HISTORY OF PULMONARY EMBOLISM: Chronic | ICD-10-CM

## 2021-07-07 DIAGNOSIS — Z79.01 LONG TERM (CURRENT) USE OF ANTICOAGULANTS: ICD-10-CM

## 2021-07-07 DIAGNOSIS — E78.00 HYPERCHOLESTEROLEMIA: ICD-10-CM

## 2021-07-07 DIAGNOSIS — Z79.01 LONG TERM (CURRENT) USE OF ANTICOAGULANTS: Primary | ICD-10-CM

## 2021-07-07 DIAGNOSIS — F13.20 BENZODIAZEPINE DEPENDENCE: ICD-10-CM

## 2021-07-07 DIAGNOSIS — R26.81 GAIT INSTABILITY: ICD-10-CM

## 2021-07-07 DIAGNOSIS — N18.31 STAGE 3A CHRONIC KIDNEY DISEASE: ICD-10-CM

## 2021-07-07 DIAGNOSIS — F33.42 RECURRENT MAJOR DEPRESSIVE DISORDER, IN FULL REMISSION: ICD-10-CM

## 2021-07-07 DIAGNOSIS — F41.1 GENERALIZED ANXIETY DISORDER: ICD-10-CM

## 2021-07-07 DIAGNOSIS — Z86.711 HISTORY OF PULMONARY EMBOLISM: ICD-10-CM

## 2021-07-07 DIAGNOSIS — F01.50 VASCULAR DEMENTIA WITHOUT BEHAVIORAL DISTURBANCE: ICD-10-CM

## 2021-07-07 PROCEDURE — 99999 PR PBB SHADOW E&M-EST. PATIENT-LVL IV: CPT | Mod: PBBFAC,,, | Performed by: FAMILY MEDICINE

## 2021-07-07 PROCEDURE — 1100F PR PT FALLS ASSESS DOC 2+ FALLS/FALL W/INJURY/YR: ICD-10-PCS | Mod: CPTII,S$GLB,, | Performed by: FAMILY MEDICINE

## 2021-07-07 PROCEDURE — 1125F PR PAIN SEVERITY QUANTIFIED, PAIN PRESENT: ICD-10-PCS | Mod: S$GLB,,, | Performed by: FAMILY MEDICINE

## 2021-07-07 PROCEDURE — 93010 ELECTROCARDIOGRAM REPORT: CPT | Mod: S$GLB,,, | Performed by: INTERNAL MEDICINE

## 2021-07-07 PROCEDURE — 1125F AMNT PAIN NOTED PAIN PRSNT: CPT | Mod: S$GLB,,, | Performed by: FAMILY MEDICINE

## 2021-07-07 PROCEDURE — 99499 RISK ADDL DX/OHS AUDIT: ICD-10-PCS | Mod: HCNC,S$GLB,, | Performed by: FAMILY MEDICINE

## 2021-07-07 PROCEDURE — 99214 PR OFFICE/OUTPT VISIT, EST, LEVL IV, 30-39 MIN: ICD-10-PCS | Mod: S$GLB,,, | Performed by: FAMILY MEDICINE

## 2021-07-07 PROCEDURE — 93010 EKG 12-LEAD: ICD-10-PCS | Mod: S$GLB,,, | Performed by: INTERNAL MEDICINE

## 2021-07-07 PROCEDURE — 3288F FALL RISK ASSESSMENT DOCD: CPT | Mod: CPTII,S$GLB,, | Performed by: FAMILY MEDICINE

## 2021-07-07 PROCEDURE — 93005 ELECTROCARDIOGRAM TRACING: CPT | Mod: S$GLB,,, | Performed by: FAMILY MEDICINE

## 2021-07-07 PROCEDURE — 1100F PTFALLS ASSESS-DOCD GE2>/YR: CPT | Mod: CPTII,S$GLB,, | Performed by: FAMILY MEDICINE

## 2021-07-07 PROCEDURE — 99214 OFFICE O/P EST MOD 30 MIN: CPT | Mod: S$GLB,,, | Performed by: FAMILY MEDICINE

## 2021-07-07 PROCEDURE — 93005 EKG 12-LEAD: ICD-10-PCS | Mod: S$GLB,,, | Performed by: FAMILY MEDICINE

## 2021-07-07 PROCEDURE — 1159F PR MEDICATION LIST DOCUMENTED IN MEDICAL RECORD: ICD-10-PCS | Mod: S$GLB,,, | Performed by: FAMILY MEDICINE

## 2021-07-07 PROCEDURE — 99999 PR PBB SHADOW E&M-EST. PATIENT-LVL IV: ICD-10-PCS | Mod: PBBFAC,,, | Performed by: FAMILY MEDICINE

## 2021-07-07 PROCEDURE — 3288F PR FALLS RISK ASSESSMENT DOCUMENTED: ICD-10-PCS | Mod: CPTII,S$GLB,, | Performed by: FAMILY MEDICINE

## 2021-07-07 PROCEDURE — 1159F MED LIST DOCD IN RCRD: CPT | Mod: S$GLB,,, | Performed by: FAMILY MEDICINE

## 2021-07-07 PROCEDURE — 99499 UNLISTED E&M SERVICE: CPT | Mod: HCNC,S$GLB,, | Performed by: FAMILY MEDICINE

## 2021-07-13 ENCOUNTER — OFFICE VISIT (OUTPATIENT)
Dept: CARDIOLOGY | Facility: CLINIC | Age: 86
End: 2021-07-13
Payer: MEDICARE

## 2021-07-13 VITALS
HEIGHT: 63 IN | HEART RATE: 79 BPM | SYSTOLIC BLOOD PRESSURE: 128 MMHG | BODY MASS INDEX: 29.75 KG/M2 | OXYGEN SATURATION: 98 % | WEIGHT: 167.88 LBS | DIASTOLIC BLOOD PRESSURE: 70 MMHG

## 2021-07-13 DIAGNOSIS — I10 ESSENTIAL HYPERTENSION: Chronic | ICD-10-CM

## 2021-07-13 DIAGNOSIS — M25.561 CHRONIC PAIN OF BOTH KNEES: ICD-10-CM

## 2021-07-13 DIAGNOSIS — M25.562 CHRONIC PAIN OF BOTH KNEES: ICD-10-CM

## 2021-07-13 DIAGNOSIS — Z79.01 LONG TERM (CURRENT) USE OF ANTICOAGULANTS: ICD-10-CM

## 2021-07-13 DIAGNOSIS — G89.29 CHRONIC PAIN OF BOTH KNEES: ICD-10-CM

## 2021-07-13 DIAGNOSIS — E78.00 HYPERCHOLESTEROLEMIA: ICD-10-CM

## 2021-07-13 DIAGNOSIS — I48.0 PAROXYSMAL ATRIAL FIBRILLATION: ICD-10-CM

## 2021-07-13 DIAGNOSIS — R26.81 GAIT INSTABILITY: Primary | ICD-10-CM

## 2021-07-13 DIAGNOSIS — N18.31 STAGE 3A CHRONIC KIDNEY DISEASE: ICD-10-CM

## 2021-07-13 PROCEDURE — 1159F PR MEDICATION LIST DOCUMENTED IN MEDICAL RECORD: ICD-10-PCS | Mod: S$GLB,,, | Performed by: INTERNAL MEDICINE

## 2021-07-13 PROCEDURE — 3074F PR MOST RECENT SYSTOLIC BLOOD PRESSURE < 130 MM HG: ICD-10-PCS | Mod: CPTII,S$GLB,, | Performed by: INTERNAL MEDICINE

## 2021-07-13 PROCEDURE — 99999 PR PBB SHADOW E&M-EST. PATIENT-LVL III: ICD-10-PCS | Mod: PBBFAC,,, | Performed by: INTERNAL MEDICINE

## 2021-07-13 PROCEDURE — 99204 OFFICE O/P NEW MOD 45 MIN: CPT | Mod: S$GLB,,, | Performed by: INTERNAL MEDICINE

## 2021-07-13 PROCEDURE — 1126F AMNT PAIN NOTED NONE PRSNT: CPT | Mod: S$GLB,,, | Performed by: INTERNAL MEDICINE

## 2021-07-13 PROCEDURE — 3074F SYST BP LT 130 MM HG: CPT | Mod: CPTII,S$GLB,, | Performed by: INTERNAL MEDICINE

## 2021-07-13 PROCEDURE — 1159F MED LIST DOCD IN RCRD: CPT | Mod: S$GLB,,, | Performed by: INTERNAL MEDICINE

## 2021-07-13 PROCEDURE — 99204 PR OFFICE/OUTPT VISIT, NEW, LEVL IV, 45-59 MIN: ICD-10-PCS | Mod: S$GLB,,, | Performed by: INTERNAL MEDICINE

## 2021-07-13 PROCEDURE — 99999 PR PBB SHADOW E&M-EST. PATIENT-LVL III: CPT | Mod: PBBFAC,,, | Performed by: INTERNAL MEDICINE

## 2021-07-13 PROCEDURE — 1126F PR PAIN SEVERITY QUANTIFIED, NO PAIN PRESENT: ICD-10-PCS | Mod: S$GLB,,, | Performed by: INTERNAL MEDICINE

## 2021-07-13 PROCEDURE — 3078F DIAST BP <80 MM HG: CPT | Mod: CPTII,S$GLB,, | Performed by: INTERNAL MEDICINE

## 2021-07-13 PROCEDURE — 3078F PR MOST RECENT DIASTOLIC BLOOD PRESSURE < 80 MM HG: ICD-10-PCS | Mod: CPTII,S$GLB,, | Performed by: INTERNAL MEDICINE

## 2021-07-14 PROCEDURE — G0180 PR HOME HEALTH MD CERTIFICATION: ICD-10-PCS | Mod: ,,, | Performed by: FAMILY MEDICINE

## 2021-07-14 PROCEDURE — G0180 MD CERTIFICATION HHA PATIENT: HCPCS | Mod: ,,, | Performed by: FAMILY MEDICINE

## 2021-07-15 ENCOUNTER — TELEPHONE (OUTPATIENT)
Dept: FAMILY MEDICINE | Facility: CLINIC | Age: 86
End: 2021-07-15

## 2021-07-19 ENCOUNTER — TELEPHONE (OUTPATIENT)
Dept: FAMILY MEDICINE | Facility: CLINIC | Age: 86
End: 2021-07-19

## 2021-08-11 ENCOUNTER — EXTERNAL HOME HEALTH (OUTPATIENT)
Dept: HOME HEALTH SERVICES | Facility: HOSPITAL | Age: 86
End: 2021-08-11
Payer: MEDICARE

## 2021-08-19 ENCOUNTER — DOCUMENT SCAN (OUTPATIENT)
Dept: HOME HEALTH SERVICES | Facility: HOSPITAL | Age: 86
End: 2021-08-19
Payer: MEDICARE

## 2021-08-19 DIAGNOSIS — U07.1 COVID-19 VIRUS INFECTION: Primary | ICD-10-CM

## 2021-08-20 ENCOUNTER — PATIENT OUTREACH (OUTPATIENT)
Dept: ADMINISTRATIVE | Facility: OTHER | Age: 86
End: 2021-08-20

## 2021-08-20 ENCOUNTER — DOCUMENT SCAN (OUTPATIENT)
Dept: HOME HEALTH SERVICES | Facility: HOSPITAL | Age: 86
End: 2021-08-20
Payer: MEDICARE

## 2021-09-08 ENCOUNTER — PATIENT MESSAGE (OUTPATIENT)
Dept: FAMILY MEDICINE | Facility: CLINIC | Age: 86
End: 2021-09-08

## 2021-09-08 DIAGNOSIS — F01.50 VASCULAR DEMENTIA WITHOUT BEHAVIORAL DISTURBANCE: Primary | ICD-10-CM

## 2021-09-13 ENCOUNTER — DOCUMENT SCAN (OUTPATIENT)
Dept: HOME HEALTH SERVICES | Facility: HOSPITAL | Age: 86
End: 2021-09-13
Payer: MEDICARE

## 2021-09-14 DIAGNOSIS — F13.20 BENZODIAZEPINE DEPENDENCE: Primary | ICD-10-CM

## 2021-09-14 DIAGNOSIS — K21.9 GASTROESOPHAGEAL REFLUX DISEASE WITHOUT ESOPHAGITIS: ICD-10-CM

## 2021-09-14 DIAGNOSIS — F13.20 BENZODIAZEPINE DEPENDENCE: ICD-10-CM

## 2021-09-14 RX ORDER — ALPRAZOLAM 0.5 MG/1
0.5 TABLET ORAL NIGHTLY PRN
Qty: 30 TABLET | Refills: 2 | Status: SHIPPED | OUTPATIENT
Start: 2021-09-14 | End: 2021-10-04 | Stop reason: SDUPTHER

## 2021-09-14 RX ORDER — OMEPRAZOLE 40 MG/1
40 CAPSULE, DELAYED RELEASE ORAL DAILY
Qty: 90 CAPSULE | Refills: 3 | Status: SHIPPED | OUTPATIENT
Start: 2021-09-14 | End: 2021-10-14

## 2021-09-14 RX ORDER — ALPRAZOLAM 0.5 MG/1
0.5 TABLET ORAL DAILY
COMMUNITY
Start: 2021-08-11 | End: 2021-09-14 | Stop reason: SDUPTHER

## 2021-09-14 RX ORDER — ALPRAZOLAM 0.5 MG/1
0.5 TABLET ORAL NIGHTLY PRN
Qty: 30 TABLET | Refills: 2 | Status: CANCELLED | OUTPATIENT
Start: 2021-09-14

## 2021-09-15 ENCOUNTER — PATIENT MESSAGE (OUTPATIENT)
Dept: FAMILY MEDICINE | Facility: CLINIC | Age: 86
End: 2021-09-15

## 2021-09-21 ENCOUNTER — EXTERNAL HOME HEALTH (OUTPATIENT)
Dept: HOME HEALTH SERVICES | Facility: HOSPITAL | Age: 86
End: 2021-09-21
Payer: MEDICARE

## 2021-10-04 DIAGNOSIS — F13.20 BENZODIAZEPINE DEPENDENCE: ICD-10-CM

## 2021-10-04 RX ORDER — ALPRAZOLAM 0.5 MG/1
0.5 TABLET ORAL NIGHTLY PRN
Qty: 30 TABLET | Refills: 2 | Status: SHIPPED | OUTPATIENT
Start: 2021-10-04 | End: 2021-10-11 | Stop reason: SDUPTHER

## 2021-10-06 ENCOUNTER — TELEPHONE (OUTPATIENT)
Dept: FAMILY MEDICINE | Facility: CLINIC | Age: 86
End: 2021-10-06

## 2021-10-11 ENCOUNTER — PATIENT MESSAGE (OUTPATIENT)
Dept: FAMILY MEDICINE | Facility: CLINIC | Age: 86
End: 2021-10-11

## 2021-10-21 ENCOUNTER — DOCUMENT SCAN (OUTPATIENT)
Dept: HOME HEALTH SERVICES | Facility: HOSPITAL | Age: 86
End: 2021-10-21
Payer: MEDICARE

## 2021-10-28 ENCOUNTER — TELEPHONE (OUTPATIENT)
Dept: FAMILY MEDICINE | Facility: CLINIC | Age: 86
End: 2021-10-28
Payer: MEDICARE

## 2021-11-01 ENCOUNTER — DOCUMENT SCAN (OUTPATIENT)
Dept: HOME HEALTH SERVICES | Facility: HOSPITAL | Age: 86
End: 2021-11-01
Payer: MEDICARE

## 2021-11-04 ENCOUNTER — TELEPHONE (OUTPATIENT)
Dept: FAMILY MEDICINE | Facility: CLINIC | Age: 86
End: 2021-11-04
Payer: MEDICARE

## 2021-11-04 DIAGNOSIS — F13.20 BENZODIAZEPINE DEPENDENCE: ICD-10-CM

## 2021-11-04 RX ORDER — ALPRAZOLAM 0.5 MG/1
0.5 TABLET ORAL NIGHTLY PRN
Qty: 30 TABLET | Refills: 2 | Status: CANCELLED | OUTPATIENT
Start: 2021-11-04

## 2021-11-09 ENCOUNTER — OFFICE VISIT (OUTPATIENT)
Dept: FAMILY MEDICINE | Facility: CLINIC | Age: 86
End: 2021-11-09
Payer: MEDICARE

## 2021-11-09 VITALS
RESPIRATION RATE: 18 BRPM | HEIGHT: 63 IN | OXYGEN SATURATION: 96 % | DIASTOLIC BLOOD PRESSURE: 60 MMHG | HEART RATE: 64 BPM | SYSTOLIC BLOOD PRESSURE: 110 MMHG | BODY MASS INDEX: 28.38 KG/M2 | WEIGHT: 160.19 LBS

## 2021-11-09 DIAGNOSIS — I48.0 PAROXYSMAL ATRIAL FIBRILLATION: ICD-10-CM

## 2021-11-09 DIAGNOSIS — F33.42 RECURRENT MAJOR DEPRESSIVE DISORDER, IN FULL REMISSION: Primary | ICD-10-CM

## 2021-11-09 DIAGNOSIS — E78.00 HYPERCHOLESTEROLEMIA: ICD-10-CM

## 2021-11-09 DIAGNOSIS — F41.1 GENERALIZED ANXIETY DISORDER: ICD-10-CM

## 2021-11-09 DIAGNOSIS — F13.20 BENZODIAZEPINE DEPENDENCE: ICD-10-CM

## 2021-11-09 DIAGNOSIS — J31.0 CHRONIC RHINITIS: ICD-10-CM

## 2021-11-09 DIAGNOSIS — I10 ESSENTIAL HYPERTENSION: Chronic | ICD-10-CM

## 2021-11-09 DIAGNOSIS — N18.31 STAGE 3A CHRONIC KIDNEY DISEASE: ICD-10-CM

## 2021-11-09 DIAGNOSIS — F43.21 GRIEVING: ICD-10-CM

## 2021-11-09 DIAGNOSIS — F01.50 VASCULAR DEMENTIA WITHOUT BEHAVIORAL DISTURBANCE: ICD-10-CM

## 2021-11-09 PROBLEM — Z79.01 LONG TERM (CURRENT) USE OF ANTICOAGULANTS: Status: RESOLVED | Noted: 2020-01-23 | Resolved: 2021-11-09

## 2021-11-09 PROCEDURE — 99499 RISK ADDL DX/OHS AUDIT: ICD-10-PCS | Mod: S$GLB,,, | Performed by: FAMILY MEDICINE

## 2021-11-09 PROCEDURE — 3078F PR MOST RECENT DIASTOLIC BLOOD PRESSURE < 80 MM HG: ICD-10-PCS | Mod: CPTII,S$GLB,, | Performed by: FAMILY MEDICINE

## 2021-11-09 PROCEDURE — 99214 OFFICE O/P EST MOD 30 MIN: CPT | Mod: S$GLB,,, | Performed by: FAMILY MEDICINE

## 2021-11-09 PROCEDURE — G0008 FLU VACCINE - QUADRIVALENT - ADJUVANTED: ICD-10-PCS | Mod: S$GLB,,, | Performed by: FAMILY MEDICINE

## 2021-11-09 PROCEDURE — 90694 FLU VACCINE - QUADRIVALENT - ADJUVANTED: ICD-10-PCS | Mod: S$GLB,,, | Performed by: FAMILY MEDICINE

## 2021-11-09 PROCEDURE — 99999 PR PBB SHADOW E&M-EST. PATIENT-LVL IV: CPT | Mod: PBBFAC,,, | Performed by: FAMILY MEDICINE

## 2021-11-09 PROCEDURE — 1159F PR MEDICATION LIST DOCUMENTED IN MEDICAL RECORD: ICD-10-PCS | Mod: CPTII,S$GLB,, | Performed by: FAMILY MEDICINE

## 2021-11-09 PROCEDURE — 3074F PR MOST RECENT SYSTOLIC BLOOD PRESSURE < 130 MM HG: ICD-10-PCS | Mod: CPTII,S$GLB,, | Performed by: FAMILY MEDICINE

## 2021-11-09 PROCEDURE — 1101F PR PT FALLS ASSESS DOC 0-1 FALLS W/OUT INJ PAST YR: ICD-10-PCS | Mod: CPTII,S$GLB,, | Performed by: FAMILY MEDICINE

## 2021-11-09 PROCEDURE — 1159F MED LIST DOCD IN RCRD: CPT | Mod: CPTII,S$GLB,, | Performed by: FAMILY MEDICINE

## 2021-11-09 PROCEDURE — G0008 ADMIN INFLUENZA VIRUS VAC: HCPCS | Mod: S$GLB,,, | Performed by: FAMILY MEDICINE

## 2021-11-09 PROCEDURE — 3288F FALL RISK ASSESSMENT DOCD: CPT | Mod: CPTII,S$GLB,, | Performed by: FAMILY MEDICINE

## 2021-11-09 PROCEDURE — 1101F PT FALLS ASSESS-DOCD LE1/YR: CPT | Mod: CPTII,S$GLB,, | Performed by: FAMILY MEDICINE

## 2021-11-09 PROCEDURE — 3288F PR FALLS RISK ASSESSMENT DOCUMENTED: ICD-10-PCS | Mod: CPTII,S$GLB,, | Performed by: FAMILY MEDICINE

## 2021-11-09 PROCEDURE — 99499 UNLISTED E&M SERVICE: CPT | Mod: S$GLB,,, | Performed by: FAMILY MEDICINE

## 2021-11-09 PROCEDURE — 99999 PR PBB SHADOW E&M-EST. PATIENT-LVL IV: ICD-10-PCS | Mod: PBBFAC,,, | Performed by: FAMILY MEDICINE

## 2021-11-09 PROCEDURE — 90694 VACC AIIV4 NO PRSRV 0.5ML IM: CPT | Mod: S$GLB,,, | Performed by: FAMILY MEDICINE

## 2021-11-09 PROCEDURE — 3078F DIAST BP <80 MM HG: CPT | Mod: CPTII,S$GLB,, | Performed by: FAMILY MEDICINE

## 2021-11-09 PROCEDURE — 1125F PR PAIN SEVERITY QUANTIFIED, PAIN PRESENT: ICD-10-PCS | Mod: CPTII,S$GLB,, | Performed by: FAMILY MEDICINE

## 2021-11-09 PROCEDURE — 1125F AMNT PAIN NOTED PAIN PRSNT: CPT | Mod: CPTII,S$GLB,, | Performed by: FAMILY MEDICINE

## 2021-11-09 PROCEDURE — 3074F SYST BP LT 130 MM HG: CPT | Mod: CPTII,S$GLB,, | Performed by: FAMILY MEDICINE

## 2021-11-09 PROCEDURE — 99214 PR OFFICE/OUTPT VISIT, EST, LEVL IV, 30-39 MIN: ICD-10-PCS | Mod: S$GLB,,, | Performed by: FAMILY MEDICINE

## 2021-11-09 RX ORDER — SERTRALINE HYDROCHLORIDE 50 MG/1
50 TABLET, FILM COATED ORAL DAILY
Qty: 90 TABLET | Refills: 3 | Status: SHIPPED | OUTPATIENT
Start: 2021-11-09 | End: 2022-03-15 | Stop reason: SDUPTHER

## 2021-11-09 RX ORDER — ALPRAZOLAM 0.5 MG/1
0.5 TABLET ORAL NIGHTLY PRN
Qty: 30 TABLET | Refills: 5 | Status: SHIPPED | OUTPATIENT
Start: 2021-11-09 | End: 2022-03-15 | Stop reason: SDUPTHER

## 2021-11-09 RX ORDER — ROSUVASTATIN CALCIUM 10 MG/1
10 TABLET, COATED ORAL DAILY
Qty: 90 TABLET | Refills: 3 | Status: SHIPPED | OUTPATIENT
Start: 2021-11-09 | End: 2022-03-15 | Stop reason: SDUPTHER

## 2021-11-09 RX ORDER — AZELASTINE 1 MG/ML
1 SPRAY, METERED NASAL 2 TIMES DAILY
Qty: 30 ML | Refills: 11 | Status: SHIPPED | OUTPATIENT
Start: 2021-11-09 | End: 2022-03-15

## 2021-11-09 RX ORDER — METOPROLOL SUCCINATE 25 MG/1
25 TABLET, EXTENDED RELEASE ORAL DAILY
Qty: 90 TABLET | Refills: 3 | Status: SHIPPED | OUTPATIENT
Start: 2021-11-09 | End: 2022-03-15 | Stop reason: SDUPTHER

## 2021-11-16 ENCOUNTER — PATIENT MESSAGE (OUTPATIENT)
Dept: FAMILY MEDICINE | Facility: CLINIC | Age: 86
End: 2021-11-16
Payer: MEDICARE

## 2021-11-16 DIAGNOSIS — N18.31 STAGE 3A CHRONIC KIDNEY DISEASE: ICD-10-CM

## 2021-12-06 DIAGNOSIS — F13.20 BENZODIAZEPINE DEPENDENCE: ICD-10-CM

## 2021-12-10 RX ORDER — ALPRAZOLAM 0.5 MG/1
0.5 TABLET ORAL NIGHTLY PRN
Qty: 30 TABLET | Refills: 5 | OUTPATIENT
Start: 2021-12-10

## 2021-12-16 ENCOUNTER — TELEPHONE (OUTPATIENT)
Dept: FAMILY MEDICINE | Facility: CLINIC | Age: 86
End: 2021-12-16
Payer: MEDICARE

## 2021-12-16 DIAGNOSIS — I10 ESSENTIAL HYPERTENSION: Chronic | ICD-10-CM

## 2021-12-17 ENCOUNTER — TELEPHONE (OUTPATIENT)
Dept: FAMILY MEDICINE | Facility: CLINIC | Age: 86
End: 2021-12-17
Payer: MEDICARE

## 2022-03-14 NOTE — PROGRESS NOTES
FAMILY MEDICINE  [facility]    Reason for visit:   Chief Complaint   Patient presents with    Follow-up       HPI: Alessandra Hidalgo is a 86 y.o. female  - with hypertension, hyperlipidemia, history of pulmonary embolism (2016) on OAC Warfarin, emphysema, depression, anxiety on chronic benzodiazepine for sleep, CKD stage 3, pAfib, mild cognitive impairment,chronic knee pain with history of knee replacement and GERD presents to follow-up blood pressure and CKD     Cardiologist: Dr. Gonzalez  [facility]  Ortho Dr. More     She lives with her son (Zuhair). She uses a rollator walker at home and wheelchair for long distances. Today she presents with her son.     1. Hypertension with pAfib     Current medication treatment:   metoprolol succinate (TOPROL-XL) 25 MG 24 hr tablet, Take 1 tablet (25 mg total) by mouth once daily., Disp: 90 tablet, Rfl: 0     Medication side effects: denies     Home BP cuff: none     2. Depression and Anxiety  - mild cognitive impairment 1/24/2020 MMSE 23/30     Today 3/15/22: Her  passed away 9/2021.  She was initially struggling the son reports that she has been doing better.  Reports she is sleeping well.  No worsening anxiety.  She is eating well.  No recent falls.    Prior notes:   11/9/21: Her  passed away 9/2021. She has been struggling with some increase anxiety and tearful thinking about her . She has good family support and is grieving. She has had some sleep issues her son but she denies. Her memory is unchanged.  7/7/21: doing well. Recent fall.     Prior treatments: Duloxetine 60 mg daily, Prozac (dizziness)  Side effects of prior treatment: lightheaded, dizziness, not effective     Current treatment:   ALPRAZolam (XANAX) 0.5 MG tablet, Take 1 tablet (0.5 mg total) by mouth nightly as needed for Anxiety., Disp: 30 tablet, Rfl: 2  - unable to wean off medication; unable to tolerate the medications  sertraline (ZOLOFT) 50 MG tablet, Take 1  "tablet (50 mg total) by mouth once daily., Disp: 90 tablet, Rfl: 1    : reviewed 3/6/22     Prior medication:  Donepezil 5 mg at bedtime-patient discontinued due to poor sleep     Side effects of current treatment: denies     Panic attacks: yes   Hopelessness: denies  Sleep:  No issues  Suicidal thoughts: denies  Thoughts of self harm:denies  Thoughts of harm to others: denies  History of suicide attempts: denies  Family history of suicide:denies     Support system: family  Counselor: none     2. Dementia  - Initial assessment: "Son reports difficulty with short term memory that has been present since pulmonary embolism with sudden cardiac arrest 2016 following left knee replacement. He reports that long term seem good but short term not as good. She will often ask him the same question over and over again. He does not assist with medications so unsure if she is taking constantly but she is able to state her medication and how she takes them"     Age diagnosed: 85 yo  Neurology or Neuropsychiatry evaluation: none     Last MMSE: 1/24/2020 23/30     Current symptoms: short term memory loss without behavioral disturbances or sleep issues.    Behavioral disturbanced: none  Sleep:  No issues     Current medication regimen:   ALPRAZolam (XANAX) 0.5 MG tablet, Take 1 tablet (0.5 mg total) by mouth nightly as needed for Anxiety., Disp: 30 tablet, Rfl: 2  sertraline (ZOLOFT) 50 MG tablet, Take 1 tablet (50 mg total) by mouth once daily., Disp: 90 tablet, Rfl: 1      Prior medication:  donepazil 5 mg at bedtime (patient discontinued due to sleep issues related with medication)     Living situation: son Stan) lives with her and her  and assists in care  DME: rollator, wheelchair      Review of Systems   All other systems reviewed and are negative.      HISTORY:   Past Medical History:   Diagnosis Date    Anxiety     Hyperlipidemia     Hypertension     Pulmonary embolism        Past Surgical History: "   Procedure Laterality Date    APPENDECTOMY      CATARACT EXTRACTION W/ INTRAOCULAR LENS  IMPLANT, BILATERAL      HYSTERECTOMY      Partial     LEFT KNEE REPLACEMENT         Family History   Problem Relation Age of Onset    Cancer Mother     Stroke Daughter     Heart disease Neg Hx        Social History     Tobacco Use    Smoking status: Former Smoker     Packs/day: 0.00     Years: 4.00     Pack years: 0.00     Types: Cigarettes    Smokeless tobacco: Former User   Substance Use Topics    Alcohol use: Yes     Alcohol/week: 1.0 standard drink     Types: 1 Glasses of wine per week    Drug use: No       Social History     Social History Narrative    Lives with son Zuhair moved in 2019 to assist with care. 9/2021  passed away. They have 4 children. She is originally from [place]. She is sedentary. She is a practicing Rastafarian and she participates in [employer]. DME: rollator walker. Tub bench       ALLERGIES:   Review of patient's allergies indicates:   Allergen Reactions    Atorvastatin     Prozac [fluoxetine]      dizziness       MEDS:     Current Outpatient Medications:     ALPRAZolam (XANAX) 0.5 MG tablet, Take 1 tablet (0.5 mg total) by mouth nightly as needed for Insomnia or Anxiety., Disp: 30 tablet, Rfl: 5    azelastine (ASTELIN) 137 mcg (0.1 %) nasal spray, 1 spray (137 mcg total) by Nasal route 2 (two) times daily. (Patient not taking: Reported on 3/15/2022), Disp: 30 mL, Rfl: 11    diclofenac sodium (VOLTAREN) 1 % Gel, Apply 2 g topically once daily., Disp: 350 g, Rfl: 11    metoprolol succinate (TOPROL-XL) 25 MG 24 hr tablet, Take 1 tablet (25 mg total) by mouth once daily., Disp: 90 tablet, Rfl: 3    omeprazole (PRILOSEC) 40 MG capsule, Take 1 capsule (40 mg total) by mouth once daily., Disp: 90 capsule, Rfl: 3    rosuvastatin (CRESTOR) 10 MG tablet, Take 1 tablet (10 mg total) by mouth once daily., Disp: 90 tablet, Rfl: 3    sertraline (ZOLOFT) 50 MG tablet, Take 1  "tablet (50 mg total) by mouth once daily., Disp: 90 tablet, Rfl: 3    Current Facility-Administered Medications:     acetaminophen tablet 650 mg, 650 mg, Oral, Once PRN, Yanna Smith MD    albuterol inhaler 2 puff, 2 puff, Inhalation, Q20 Min PRN, Yanna Smith MD    diphenhydrAMINE injection 25 mg, 25 mg, Intravenous, Once PRN, Yanna Smith MD    EPINEPHrine (EPIPEN) 0.3 mg/0.3 mL pen injection 0.3 mg, 0.3 mg, Intramuscular, PRN, Yanna Smith MD    methylPREDNISolone sodium succinate injection 40 mg, 40 mg, Intravenous, Once PRN, Yanna Smith MD    ondansetron disintegrating tablet 4 mg, 4 mg, Oral, Once PRN, Yanna Smith MD    sodium chloride 0.9% 500 mL flush bag, , Intravenous, PRN, Yanna Smith MD    sodium chloride 0.9% flush 10 mL, 10 mL, Intravenous, PRN, Yanna Smith MD    Vital signs:   Vitals:    03/15/22 0958   BP: 118/70   BP Location: Left arm   Patient Position: Sitting   BP Method: Large (Manual)   Pulse: 70   Resp: 18   SpO2: 98%   Weight: 73.8 kg (162 lb 12.8 oz)   Height: 5' 3" (1.6 m)     Body mass index is 28.84 kg/m².    PHYSICAL EXAM:     Physical Exam  Constitutional:       General: She is not in acute distress.  Cardiovascular:      Rate and Rhythm: Normal rate and regular rhythm.      Pulses: Normal pulses.      Heart sounds: Murmur heard.    Systolic murmur is present with a grade of 3/6.    No friction rub. No gallop.   Pulmonary:      Effort: Pulmonary effort is normal.      Breath sounds: Normal breath sounds. No wheezing, rhonchi or rales.   Musculoskeletal:      Cervical back: Neck supple.      Right lower leg: No edema.      Left lower leg: No edema.   Neurological:      Mental Status: She is alert.   Psychiatric:         Mood and Affect: Mood and affect normal.           PHQ4 = Score: 4    PERTINENT RESULTS:   Lab Visit on 03/11/2022   Component Date Value Ref Range Status    Sodium 03/11/2022 146 (A) 136 - 145 " mmol/L Final    Potassium 03/11/2022 4.7  3.5 - 5.1 mmol/L Final    Chloride 03/11/2022 109  95 - 110 mmol/L Final    CO2 03/11/2022 26  23 - 29 mmol/L Final    Glucose 03/11/2022 92  70 - 110 mg/dL Final    BUN 03/11/2022 27 (A) 7 - 17 mg/dL Final    Creatinine 03/11/2022 1.17  0.50 - 1.40 mg/dL Final    Calcium 03/11/2022 9.7  8.7 - 10.5 mg/dL Final    Anion Gap 03/11/2022 11  8 - 16 mmol/L Final    eGFR if African American 03/11/2022 49.4 (A) >60 mL/min/1.73 m^2 Final    eGFR if non African American 03/11/2022 42.9 (A) >60 mL/min/1.73 m^2 Final    Comment: Calculation used to obtain the estimated glomerular filtration  rate (eGFR) is the CKD-EPI equation.          [id number] [phone] 11/15/2021 Routine     Narrative & Impression  EXAMINATION:  US RETROPERITONEAL COMPLETE     CLINICAL HISTORY:  Chronic kidney disease, stage 3a     TECHNIQUE:  Ultrasound of the kidneys and urinary bladder was performed including color flow and Doppler evaluation of the kidneys.     FINDINGS:  The right and left kidneys measure 10.1 and 9.9 cm respectively.  The right and left resistive indices measure 0.83 and 0.82 respectively which is elevated consistent with medical renal disease.  There is no hydronephrosis or nephrolithiasis.  There is subcentimeter hypoechoic foci within the right and left kidneys which are too small to characterize.  The bladder is unremarkable.     Impression:     Elevated resistive indices consistent with medical renal disease.        Electronically signed by: Jefe Quiñones MD  Date:                                            11/15/2021  Time:                                           11:29        ASSESSMENT/PLAN:  Problem List Items Addressed This Visit        Neuro    Vascular dementia without behavioral disturbance    Overview     - 1/24/2020 MMSE 23  - trial Aricepted and pt did to not tolerate  - patient declined medication and family is supportive  - supportive care  - counseling with  anticipatory guidance to rogelio Moffett  - doing well and good support              Psychiatric    Benzodiazepine dependence    Overview     - she was unable to tolerate off of alprazolam  - okay to continue alprazolam 0.25-0.5 mg nightly as needed           Relevant Medications    ALPRAZolam (XANAX) 0.5 MG tablet    Generalized anxiety disorder    Overview     - with benzodiazepine dependence   - continue Sertraline 50 mg daily           Recurrent major depressive disorder, in full remission    Overview     - well controlled  - continue current medication           Relevant Medications    sertraline (ZOLOFT) 50 MG tablet       Cardiac/Vascular    Essential hypertension - Primary (Chronic)    Overview     - well controlled  - continue current medication           Relevant Medications    metoprolol succinate (TOPROL-XL) 25 MG 24 hr tablet    Other Relevant Orders    Comprehensive Metabolic Panel    CBC Without Differential    Lipid Panel    Hypercholesterolemia    Overview     Lab Results   Component Value Date    LDLCALC 107.8 07/01/2021     - well controlled  - continue current medication           Relevant Medications    rosuvastatin (CRESTOR) 10 MG tablet    Other Relevant Orders    Comprehensive Metabolic Panel    Lipid Panel    Paroxysmal atrial fibrillation    Overview     - followed by Cardiology  - OAC warfarin discontinued by Cardiology 7/13/21 due to increase fall risk.  Note reviewed  - RRR today  - no longer on oral anticoagulation           Relevant Orders    CBC Without Differential       Renal/    Stage 3a chronic kidney disease    Overview     Lab Results   Component Value Date    CREATININE 1.17 03/11/2022     - 11/15/21 Renal US:  Medical renal disease  - baseline creatinine 1 to 1.12  - baseline creatinine 45-49  - low-salt diet  - good water hydration  - avoid nephrotoxic medication  - stable and continue to monitor              GI    Gastroesophageal reflux disease without esophagitis (Chronic)     Relevant Medications    omeprazole (PRILOSEC) 40 MG capsule       Orthopedic    Chronic pain of both knees    Relevant Medications    diclofenac sodium (VOLTAREN) 1 % Gel    Osteopenia of multiple sites    Overview     - 03/08/2021 DEXA scan: Osteopenia that has improved in the low back and unchanged in the left femoral neck  - fall prevention  - weight bearing exercises like walking, squats, stair and jogging if able  - over the counter vitamin D3 9125-2096 units daily  - dietary calcium 1200 mg per day with diet or supplements   - repeat bone density scan in 3 years                 ORDERS:   Orders Placed This Encounter    Comprehensive Metabolic Panel    CBC Without Differential    Lipid Panel    rosuvastatin (CRESTOR) 10 MG tablet    sertraline (ZOLOFT) 50 MG tablet    omeprazole (PRILOSEC) 40 MG capsule    metoprolol succinate (TOPROL-XL) 25 MG 24 hr tablet    ALPRAZolam (XANAX) 0.5 MG tablet    diclofenac sodium (VOLTAREN) 1 % Gel       Vaccines recommended:   COVID booster    Follow-up in 4 months with labs or sooner if any concerns.    This note is dictated using the M*Modal Fluency Direct word recognition program. There are word recognition mistakes that are occasionally missed on review.    Dr. Milli Rey D.O.   Family Medicine

## 2022-03-14 NOTE — PATIENT INSTRUCTIONS
Covid-19 vaccination scheduling  You can schedule on your MyChart  Go to Visits  Schedule visit  Scroll to the bottom and select Covid-19 vaccine/booster  Answer questions  Select location   You can also call [phone]  The Moderna vaccine is available at any [facility] pharmacy for walk in  Local pharmacies also have the booster available

## 2022-03-15 ENCOUNTER — OFFICE VISIT (OUTPATIENT)
Dept: FAMILY MEDICINE | Facility: CLINIC | Age: 87
End: 2022-03-15
Payer: MEDICARE

## 2022-03-15 VITALS
SYSTOLIC BLOOD PRESSURE: 118 MMHG | RESPIRATION RATE: 18 BRPM | HEART RATE: 70 BPM | DIASTOLIC BLOOD PRESSURE: 70 MMHG | BODY MASS INDEX: 28.85 KG/M2 | WEIGHT: 162.81 LBS | OXYGEN SATURATION: 98 % | HEIGHT: 63 IN

## 2022-03-15 DIAGNOSIS — N18.31 STAGE 3A CHRONIC KIDNEY DISEASE: ICD-10-CM

## 2022-03-15 DIAGNOSIS — K21.9 GASTROESOPHAGEAL REFLUX DISEASE WITHOUT ESOPHAGITIS: ICD-10-CM

## 2022-03-15 DIAGNOSIS — M25.561 CHRONIC PAIN OF BOTH KNEES: ICD-10-CM

## 2022-03-15 DIAGNOSIS — G89.29 CHRONIC PAIN OF BOTH KNEES: ICD-10-CM

## 2022-03-15 DIAGNOSIS — F41.1 GENERALIZED ANXIETY DISORDER: ICD-10-CM

## 2022-03-15 DIAGNOSIS — I48.0 PAROXYSMAL ATRIAL FIBRILLATION: ICD-10-CM

## 2022-03-15 DIAGNOSIS — F01.50 VASCULAR DEMENTIA WITHOUT BEHAVIORAL DISTURBANCE: ICD-10-CM

## 2022-03-15 DIAGNOSIS — I10 ESSENTIAL HYPERTENSION: Primary | Chronic | ICD-10-CM

## 2022-03-15 DIAGNOSIS — M25.562 CHRONIC PAIN OF BOTH KNEES: ICD-10-CM

## 2022-03-15 DIAGNOSIS — F13.20 BENZODIAZEPINE DEPENDENCE: ICD-10-CM

## 2022-03-15 DIAGNOSIS — E78.00 HYPERCHOLESTEROLEMIA: ICD-10-CM

## 2022-03-15 DIAGNOSIS — M85.89 OSTEOPENIA OF MULTIPLE SITES: ICD-10-CM

## 2022-03-15 DIAGNOSIS — F33.42 RECURRENT MAJOR DEPRESSIVE DISORDER, IN FULL REMISSION: ICD-10-CM

## 2022-03-15 PROBLEM — F43.21 GRIEVING: Status: RESOLVED | Noted: 2021-11-09 | Resolved: 2022-03-15

## 2022-03-15 PROCEDURE — 3288F FALL RISK ASSESSMENT DOCD: CPT | Mod: CPTII,S$GLB,, | Performed by: FAMILY MEDICINE

## 2022-03-15 PROCEDURE — 3074F SYST BP LT 130 MM HG: CPT | Mod: CPTII,S$GLB,, | Performed by: FAMILY MEDICINE

## 2022-03-15 PROCEDURE — 1125F AMNT PAIN NOTED PAIN PRSNT: CPT | Mod: CPTII,S$GLB,, | Performed by: FAMILY MEDICINE

## 2022-03-15 PROCEDURE — 3078F PR MOST RECENT DIASTOLIC BLOOD PRESSURE < 80 MM HG: ICD-10-PCS | Mod: CPTII,S$GLB,, | Performed by: FAMILY MEDICINE

## 2022-03-15 PROCEDURE — 1159F MED LIST DOCD IN RCRD: CPT | Mod: CPTII,S$GLB,, | Performed by: FAMILY MEDICINE

## 2022-03-15 PROCEDURE — 1125F PR PAIN SEVERITY QUANTIFIED, PAIN PRESENT: ICD-10-PCS | Mod: CPTII,S$GLB,, | Performed by: FAMILY MEDICINE

## 2022-03-15 PROCEDURE — 3074F PR MOST RECENT SYSTOLIC BLOOD PRESSURE < 130 MM HG: ICD-10-PCS | Mod: CPTII,S$GLB,, | Performed by: FAMILY MEDICINE

## 2022-03-15 PROCEDURE — 1159F PR MEDICATION LIST DOCUMENTED IN MEDICAL RECORD: ICD-10-PCS | Mod: CPTII,S$GLB,, | Performed by: FAMILY MEDICINE

## 2022-03-15 PROCEDURE — 99499 RISK ADDL DX/OHS AUDIT: ICD-10-PCS | Mod: S$GLB,,, | Performed by: FAMILY MEDICINE

## 2022-03-15 PROCEDURE — 1101F PR PT FALLS ASSESS DOC 0-1 FALLS W/OUT INJ PAST YR: ICD-10-PCS | Mod: CPTII,S$GLB,, | Performed by: FAMILY MEDICINE

## 2022-03-15 PROCEDURE — 99214 PR OFFICE/OUTPT VISIT, EST, LEVL IV, 30-39 MIN: ICD-10-PCS | Mod: S$GLB,,, | Performed by: FAMILY MEDICINE

## 2022-03-15 PROCEDURE — 3288F PR FALLS RISK ASSESSMENT DOCUMENTED: ICD-10-PCS | Mod: CPTII,S$GLB,, | Performed by: FAMILY MEDICINE

## 2022-03-15 PROCEDURE — 1101F PT FALLS ASSESS-DOCD LE1/YR: CPT | Mod: CPTII,S$GLB,, | Performed by: FAMILY MEDICINE

## 2022-03-15 PROCEDURE — 99999 PR PBB SHADOW E&M-EST. PATIENT-LVL III: ICD-10-PCS | Mod: PBBFAC,,, | Performed by: FAMILY MEDICINE

## 2022-03-15 PROCEDURE — 99214 OFFICE O/P EST MOD 30 MIN: CPT | Mod: S$GLB,,, | Performed by: FAMILY MEDICINE

## 2022-03-15 PROCEDURE — 99499 UNLISTED E&M SERVICE: CPT | Mod: S$GLB,,, | Performed by: FAMILY MEDICINE

## 2022-03-15 PROCEDURE — 3078F DIAST BP <80 MM HG: CPT | Mod: CPTII,S$GLB,, | Performed by: FAMILY MEDICINE

## 2022-03-15 PROCEDURE — 99999 PR PBB SHADOW E&M-EST. PATIENT-LVL III: CPT | Mod: PBBFAC,,, | Performed by: FAMILY MEDICINE

## 2022-03-15 RX ORDER — METOPROLOL SUCCINATE 25 MG/1
25 TABLET, EXTENDED RELEASE ORAL DAILY
Qty: 90 TABLET | Refills: 3 | Status: SHIPPED | OUTPATIENT
Start: 2022-03-15 | End: 2022-07-14 | Stop reason: SDUPTHER

## 2022-03-15 RX ORDER — BUSPIRONE HYDROCHLORIDE 5 MG/1
5 TABLET ORAL NIGHTLY PRN
Qty: 90 TABLET | Refills: 3 | Status: CANCELLED | OUTPATIENT
Start: 2022-03-15 | End: 2023-03-15

## 2022-03-15 RX ORDER — ROSUVASTATIN CALCIUM 10 MG/1
10 TABLET, COATED ORAL DAILY
Qty: 90 TABLET | Refills: 3 | Status: SHIPPED | OUTPATIENT
Start: 2022-03-15 | End: 2022-07-11

## 2022-03-15 RX ORDER — OMEPRAZOLE 40 MG/1
40 CAPSULE, DELAYED RELEASE ORAL DAILY
Qty: 90 CAPSULE | Refills: 3 | Status: SHIPPED | OUTPATIENT
Start: 2022-03-15 | End: 2022-07-14 | Stop reason: SDUPTHER

## 2022-03-15 RX ORDER — SERTRALINE HYDROCHLORIDE 50 MG/1
50 TABLET, FILM COATED ORAL DAILY
Qty: 90 TABLET | Refills: 3 | Status: SHIPPED | OUTPATIENT
Start: 2022-03-15 | End: 2022-07-14 | Stop reason: SDUPTHER

## 2022-03-15 RX ORDER — DICLOFENAC SODIUM 10 MG/G
2 GEL TOPICAL DAILY
Qty: 350 G | Refills: 11 | Status: SHIPPED | OUTPATIENT
Start: 2022-03-15 | End: 2023-07-28

## 2022-03-15 RX ORDER — ALPRAZOLAM 0.5 MG/1
0.5 TABLET ORAL NIGHTLY PRN
Qty: 30 TABLET | Refills: 5 | Status: SHIPPED | OUTPATIENT
Start: 2022-03-15 | End: 2022-05-16 | Stop reason: SDUPTHER

## 2022-05-16 ENCOUNTER — PATIENT MESSAGE (OUTPATIENT)
Dept: FAMILY MEDICINE | Facility: CLINIC | Age: 87
End: 2022-05-16
Payer: MEDICARE

## 2022-05-16 DIAGNOSIS — F13.20 BENZODIAZEPINE DEPENDENCE: ICD-10-CM

## 2022-05-16 RX ORDER — ALPRAZOLAM 0.5 MG/1
0.5 TABLET ORAL NIGHTLY PRN
Qty: 30 TABLET | Refills: 5 | Status: SHIPPED | OUTPATIENT
Start: 2022-05-16 | End: 2022-11-29

## 2022-05-16 NOTE — TELEPHONE ENCOUNTER
Care Due:                  Date            Visit Type   Department     Provider  --------------------------------------------------------------------------------                                EP -                              PRIMARY      [facility]  Last Visit: 03-      [facility])   [facility]                               -                              PRIMARY      [facility]  Next Visit: 07-      CARE ([facility])   [facility]                                                            Last  Test          Frequency    Reason                     Performed    Due Date  --------------------------------------------------------------------------------    CMP.........  12 months..  rosuvastatin.............  07- 06-    Lipid Panel.  12 months..  rosuvastatin.............  07- 06-    [employer] Embedded Care Gaps. Reference number: [id number]. 5/16/2022   9:49:19 AM CDT

## 2022-07-07 ENCOUNTER — PATIENT MESSAGE (OUTPATIENT)
Dept: FAMILY MEDICINE | Facility: CLINIC | Age: 87
End: 2022-07-07
Payer: MEDICARE

## 2022-07-14 ENCOUNTER — OFFICE VISIT (OUTPATIENT)
Dept: FAMILY MEDICINE | Facility: CLINIC | Age: 87
End: 2022-07-14
Payer: MEDICARE

## 2022-07-14 VITALS
WEIGHT: 163.63 LBS | OXYGEN SATURATION: 96 % | HEART RATE: 69 BPM | SYSTOLIC BLOOD PRESSURE: 118 MMHG | RESPIRATION RATE: 18 BRPM | DIASTOLIC BLOOD PRESSURE: 70 MMHG | BODY MASS INDEX: 28.99 KG/M2 | HEIGHT: 63 IN

## 2022-07-14 DIAGNOSIS — E78.00 HYPERCHOLESTEROLEMIA: ICD-10-CM

## 2022-07-14 DIAGNOSIS — I10 ESSENTIAL HYPERTENSION: Chronic | ICD-10-CM

## 2022-07-14 DIAGNOSIS — F13.20 BENZODIAZEPINE DEPENDENCE: ICD-10-CM

## 2022-07-14 DIAGNOSIS — F33.42 RECURRENT MAJOR DEPRESSIVE DISORDER, IN FULL REMISSION: ICD-10-CM

## 2022-07-14 DIAGNOSIS — K21.9 GASTROESOPHAGEAL REFLUX DISEASE WITHOUT ESOPHAGITIS: ICD-10-CM

## 2022-07-14 DIAGNOSIS — N18.32 STAGE 3B CHRONIC KIDNEY DISEASE: ICD-10-CM

## 2022-07-14 DIAGNOSIS — F41.1 GENERALIZED ANXIETY DISORDER: ICD-10-CM

## 2022-07-14 DIAGNOSIS — I48.0 PAROXYSMAL ATRIAL FIBRILLATION: ICD-10-CM

## 2022-07-14 DIAGNOSIS — F01.50 VASCULAR DEMENTIA WITHOUT BEHAVIORAL DISTURBANCE: Primary | ICD-10-CM

## 2022-07-14 PROCEDURE — 3078F DIAST BP <80 MM HG: CPT | Mod: CPTII,S$GLB,, | Performed by: FAMILY MEDICINE

## 2022-07-14 PROCEDURE — 99214 OFFICE O/P EST MOD 30 MIN: CPT | Mod: S$GLB,,, | Performed by: FAMILY MEDICINE

## 2022-07-14 PROCEDURE — 99499 UNLISTED E&M SERVICE: CPT | Mod: S$GLB,,, | Performed by: FAMILY MEDICINE

## 2022-07-14 PROCEDURE — 3078F PR MOST RECENT DIASTOLIC BLOOD PRESSURE < 80 MM HG: ICD-10-PCS | Mod: CPTII,S$GLB,, | Performed by: FAMILY MEDICINE

## 2022-07-14 PROCEDURE — 1101F PT FALLS ASSESS-DOCD LE1/YR: CPT | Mod: CPTII,S$GLB,, | Performed by: FAMILY MEDICINE

## 2022-07-14 PROCEDURE — 3288F FALL RISK ASSESSMENT DOCD: CPT | Mod: CPTII,S$GLB,, | Performed by: FAMILY MEDICINE

## 2022-07-14 PROCEDURE — 3074F SYST BP LT 130 MM HG: CPT | Mod: CPTII,S$GLB,, | Performed by: FAMILY MEDICINE

## 2022-07-14 PROCEDURE — 3074F PR MOST RECENT SYSTOLIC BLOOD PRESSURE < 130 MM HG: ICD-10-PCS | Mod: CPTII,S$GLB,, | Performed by: FAMILY MEDICINE

## 2022-07-14 PROCEDURE — 99499 RISK ADDL DX/OHS AUDIT: ICD-10-PCS | Mod: S$GLB,,, | Performed by: FAMILY MEDICINE

## 2022-07-14 PROCEDURE — 1125F PR PAIN SEVERITY QUANTIFIED, PAIN PRESENT: ICD-10-PCS | Mod: CPTII,S$GLB,, | Performed by: FAMILY MEDICINE

## 2022-07-14 PROCEDURE — 1159F PR MEDICATION LIST DOCUMENTED IN MEDICAL RECORD: ICD-10-PCS | Mod: CPTII,S$GLB,, | Performed by: FAMILY MEDICINE

## 2022-07-14 PROCEDURE — 99214 PR OFFICE/OUTPT VISIT, EST, LEVL IV, 30-39 MIN: ICD-10-PCS | Mod: S$GLB,,, | Performed by: FAMILY MEDICINE

## 2022-07-14 PROCEDURE — 99999 PR PBB SHADOW E&M-EST. PATIENT-LVL IV: ICD-10-PCS | Mod: PBBFAC,,, | Performed by: FAMILY MEDICINE

## 2022-07-14 PROCEDURE — 3288F PR FALLS RISK ASSESSMENT DOCUMENTED: ICD-10-PCS | Mod: CPTII,S$GLB,, | Performed by: FAMILY MEDICINE

## 2022-07-14 PROCEDURE — 1159F MED LIST DOCD IN RCRD: CPT | Mod: CPTII,S$GLB,, | Performed by: FAMILY MEDICINE

## 2022-07-14 PROCEDURE — 99999 PR PBB SHADOW E&M-EST. PATIENT-LVL IV: CPT | Mod: PBBFAC,,, | Performed by: FAMILY MEDICINE

## 2022-07-14 PROCEDURE — 1125F AMNT PAIN NOTED PAIN PRSNT: CPT | Mod: CPTII,S$GLB,, | Performed by: FAMILY MEDICINE

## 2022-07-14 PROCEDURE — 1101F PR PT FALLS ASSESS DOC 0-1 FALLS W/OUT INJ PAST YR: ICD-10-PCS | Mod: CPTII,S$GLB,, | Performed by: FAMILY MEDICINE

## 2022-07-14 RX ORDER — SERTRALINE HYDROCHLORIDE 50 MG/1
50 TABLET, FILM COATED ORAL DAILY
Qty: 90 TABLET | Refills: 3 | Status: SHIPPED | OUTPATIENT
Start: 2022-07-14 | End: 2022-11-07

## 2022-07-14 RX ORDER — MEMANTINE HYDROCHLORIDE 5 MG/1
5 TABLET ORAL 2 TIMES DAILY
Qty: 180 TABLET | Refills: 3 | Status: SHIPPED | OUTPATIENT
Start: 2022-07-14 | End: 2022-07-14

## 2022-07-14 RX ORDER — OMEPRAZOLE 40 MG/1
40 CAPSULE, DELAYED RELEASE ORAL DAILY
Qty: 90 CAPSULE | Refills: 3 | Status: SHIPPED | OUTPATIENT
Start: 2022-07-14 | End: 2022-09-13

## 2022-07-14 RX ORDER — MEMANTINE HYDROCHLORIDE 5 MG/1
5 TABLET ORAL DAILY
Qty: 90 TABLET | Refills: 3 | Status: SHIPPED | OUTPATIENT
Start: 2022-07-14 | End: 2023-04-11 | Stop reason: SDUPTHER

## 2022-07-14 RX ORDER — METOPROLOL SUCCINATE 25 MG/1
25 TABLET, EXTENDED RELEASE ORAL DAILY
Qty: 90 TABLET | Refills: 3 | Status: SHIPPED | OUTPATIENT
Start: 2022-07-14 | End: 2023-04-11 | Stop reason: SDUPTHER

## 2022-07-14 NOTE — PROGRESS NOTES
"FAMILY MEDICINE  [facility]    Reason for visit:   Chief Complaint   Patient presents with    Follow-up    Memory Loss       HPI: Alessandra Hidalgo is a 86 y.o. female  - with hypertension, hyperlipidemia, history of pulmonary embolism (2016) on OAC Warfarin, emphysema, depression, anxiety on chronic benzodiazepine for sleep, CKD stage 3, pAfib, mild cognitive impairment,chronic knee pain with history of knee replacement and GERD presents to follow-up     Cardiologist: Dr. Gonzalez  [facility]  Ortho Dr. More     She lives with her son (Zuhair). She uses a rollator walker at home and wheelchair for long distances. Today she presents with her son in law Kamlesh who reports that she has been doing well though her memory seems to be worsening.     1. Hypertension with pAfib     Current medication treatment:   metoprolol succinate (TOPROL-XL) 25 MG 24 hr tablet, Take 1 tablet (25 mg total) by mouth once daily., Disp: 90 tablet, Rfl: 0     Medication side effects: denies     Home BP cuff: none     2. Dementia  - Initial assessment: "Son reports difficulty with short term memory that has been present since pulmonary embolism with sudden cardiac arrest 2016 following left knee replacement. He reports that long term seem good but short term not as good. She will often ask him the same question over and over again. He does not assist with medications so unsure if she is taking constantly but she is able to state her medication and how she takes them"     Age diagnosed: 83 yo  Neurology or Neuropsychiatry evaluation: none     Last MMSE: 1/24/2020 23/30  7/14/22 19/20     Today 7/14/22: Here with son in law from [place]. He have noticed that her memory seems to be worsening however they report that she has not had any behavioral issues.  She sleeps well.  She eats well.  She has good support from family.  She takes her alprazolam at bedtime.  She has been taking her sertraline 50 mg daily.  Her son Omid puts " together all her medicines daily.  He deny any agitation.  He would like to see if she could start another memory medication to see if it would be helpful    Current symptoms: short term memory loss without behavioral disturbances or sleep issues.    Behavioral disturbanced: none  Sleep:  No issues     Current medication regimen:   ALPRAZolam (XANAX) 0.5 MG tablet, Take 1 tablet (0.5 mg total) by mouth nightly as needed for Anxiety., Disp: 30 tablet, Rfl: 2  sertraline (ZOLOFT) 50 MG tablet, Take 1 tablet (50 mg total) by mouth once daily., Disp: 90 tablet, Rfl: 1      Prior medication:  donepazil 5 mg at bedtime (patient discontinued due to sleep issues related with medication)     Living situation: son (Zuhair) lives with her and her  and assists in care  DME: rollator, wheelchair      Review of Systems   All other systems reviewed and are negative.      HISTORY:   Past Medical History:   Diagnosis Date    Anxiety     Hyperlipidemia     Hypertension     Pulmonary embolism        Past Surgical History:   Procedure Laterality Date    APPENDECTOMY      CATARACT EXTRACTION W/ INTRAOCULAR LENS  IMPLANT, BILATERAL      HYSTERECTOMY      Partial     LEFT KNEE REPLACEMENT         Family History   Problem Relation Age of Onset    Cancer Mother     Stroke Daughter     Heart disease Neg Hx        Social History     Tobacco Use    Smoking status: Former Smoker     Packs/day: 0.00     Years: 4.00     Pack years: 0.00     Types: Cigarettes    Smokeless tobacco: Former User   Substance Use Topics    Alcohol use: Yes     Alcohol/week: 1.0 standard drink     Types: 1 Glasses of wine per week    Drug use: No       Social History     Social History Narrative    Lives with son Zuhair moved in 2019 to assist with care. 9/2021  passed away. They have 4 children. She is originally from [place]. She is sedentary. She is a practicing [employer] and she participates in [employer]. DME: rollator  "walker. Tub bench       ALLERGIES:   Review of patient's allergies indicates:   Allergen Reactions    Atorvastatin     Prozac [fluoxetine]      dizziness       MEDS:     Current Outpatient Medications:     ALPRAZolam (XANAX) 0.5 MG tablet, Take 1 tablet (0.5 mg total) by mouth nightly as needed for Insomnia or Anxiety., Disp: 30 tablet, Rfl: 5    diclofenac sodium (VOLTAREN) 1 % Gel, Apply 2 g topically once daily., Disp: 350 g, Rfl: 11    rosuvastatin (CRESTOR) 10 MG tablet, TAKE 1 TABLET BY MOUTH ONCE DAILY FOR CHOLESTEROL, Disp: 90 tablet, Rfl: 2    memantine (NAMENDA) 5 MG Tab, Take 1 tablet (5 mg total) by mouth once daily., Disp: 90 tablet, Rfl: 3    metoprolol succinate (TOPROL-XL) 25 MG 24 hr tablet, Take 1 tablet (25 mg total) by mouth once daily., Disp: 90 tablet, Rfl: 3    omeprazole (PRILOSEC) 40 MG capsule, Take 1 capsule (40 mg total) by mouth once daily., Disp: 90 capsule, Rfl: 3    sertraline (ZOLOFT) 50 MG tablet, Take 1 tablet (50 mg total) by mouth once daily., Disp: 90 tablet, Rfl: 3  No current facility-administered medications for this visit.    Vital signs:   Vitals:    07/14/22 1104   BP: 118/70   BP Location: Left arm   Patient Position: Sitting   BP Method: Large (Manual)   Pulse: 69   Resp: 18   SpO2: 96%   Weight: 74.2 kg (163 lb 9.6 oz)   Height: 5' 3" (1.6 m)     Body mass index is 28.98 kg/m².    PHYSICAL EXAM:     Physical Exam  Constitutional:       General: She is not in acute distress.  Cardiovascular:      Rate and Rhythm: Normal rate and regular rhythm.      Pulses: Normal pulses.      Heart sounds: Murmur heard.    Systolic murmur is present with a grade of 3/6.    No friction rub. No gallop.   Pulmonary:      Effort: Pulmonary effort is normal.      Breath sounds: Normal breath sounds. No wheezing, rhonchi or rales.   Musculoskeletal:      Cervical back: Neck supple.      Right lower leg: No edema.      Left lower leg: No edema.   Neurological:      Mental Status: She " "is alert.   Psychiatric:         Mood and Affect: Affect normal.         Speech: Speech normal.         Behavior: Behavior normal.      Comments: Mood:  "I have  been sad since my   last month" (her  passed 2021)           PHQ4 = PHQ-4 Score: 4  MMSE 2022   What is the (year), (season), (date), (day), (month)? 2   Where are we (state), (country), (town or city), (hospital), (floor)? 4   Name 3 common objects (eg. "apple", "table", "huey"). Take 1 second to say each. Then ask the patient to repeat all 3. Give 1 point for each correct answer. Then repeat them until he/she learns all 3. Count trials and record. 3   Serial 7's backwards. Stop after 5 answers. (100,93,86,79,72) or alternatively  spell "WORLD" backwards. (D..L..R..O..W). The score is the number of letters in correct order. 3   Ask for the 3 common objects named earlier in the exam. Give 1 point for each correct answer. 0   Name a "pencil" and "watch." 2   Repeat the following: "No ifs, ands, or buts." 0   Follow a 3-stage command: "Take a paper in your right hand, fold it in half, & put it on the floor." 3   Read and obey the following: (see paper exam) 1   Write a sentence. 1   Copy the following design: (see paper exam) 0   Total MMSE Score 19   Some recent data might be hidden       PERTINENT RESULTS:   Lab Visit on 2022   Component Date Value Ref Range Status    Sodium 2022 139  136 - 145 mmol/L Final    Potassium 2022 4.9  3.5 - 5.1 mmol/L Final    Chloride 2022 102  95 - 110 mmol/L Final    CO2 2022 28  23 - 29 mmol/L Final    Glucose 2022 102  70 - 110 mg/dL Final    BUN 2022 22 (A) 7 - 17 mg/dL Final    Creatinine 2022 1.26  0.50 - 1.40 mg/dL Final    Calcium 2022 9.6  8.7 - 10.5 mg/dL Final    Total Protein 2022 7.7  6.0 - 8.4 g/dL Final    Albumin 2022 4.6  3.5 - 5.2 g/dL Final    Total Bilirubin 2022 0.7  0.1 - 1.0 mg/dL Final    " Comment: For infants and newborns, interpretation of results should be based  on gestational age, weight and in agreement with clinical  observations.    Premature Infant recommended reference ranges:  Up to 24 hours.............<8.0 mg/dL  Up to 48 hours............<12.0 mg/dL  3-5 days..................<15.0 mg/dL  6-29 days.................<15.0 mg/dL      Alkaline Phosphatase 07/08/2022 105  38 - 126 U/L Final    AST 07/08/2022 23  15 - 46 U/L Final    ALT 07/08/2022 13  10 - 44 U/L Final    Anion Gap 07/08/2022 9  8 - 16 mmol/L Final    eGFR if  07/08/2022 45.2 (A) >60 mL/min/1.73 m^2 Final    eGFR if non  07/08/2022 39.2 (A) >60 mL/min/1.73 m^2 Final    Comment: Calculation used to obtain the estimated glomerular filtration  rate (eGFR) is the CKD-EPI equation.       WBC 07/08/2022 8.91  3.90 - 12.70 K/uL Final    RBC 07/08/2022 4.47  4.00 - 5.40 M/uL Final    Hemoglobin 07/08/2022 13.4  12.0 - 16.0 g/dL Final    Hematocrit 07/08/2022 41.2  37.0 - 48.5 % Final    MCV 07/08/2022 92  82 - 98 fL Final    MCH 07/08/2022 30.0  27.0 - 31.0 pg Final    MCHC 07/08/2022 32.5  32.0 - 36.0 g/dL Final    RDW 07/08/2022 14.8 (A) 11.5 - 14.5 % Final    Platelets 07/08/2022 302  150 - 450 K/uL Final    MPV 07/08/2022 11.3  9.2 - 12.9 fL Final    Cholesterol 07/08/2022 207 (A) 120 - 199 mg/dL Final    Comment: The National Cholesterol Education Program (NCEP) has set the  following guidelines (reference ranges) for Cholesterol:  Optimal.....................<200 mg/dL  Borderline High.............200-239 mg/dL  High........................> or = 240 mg/dL      Triglycerides 07/08/2022 176 (A) 30 - 150 mg/dL Final    Comment: The National Cholesterol Education Program (NCEP) has set the  following guidelines (reference values) for triglycerides:  Normal......................<150 mg/dL  Borderline High.............150-199 mg/dL  High........................200-499 mg/dL      HDL  07/08/2022 50  40 - 75 mg/dL Final    Comment: The National Cholesterol Education Program (NCEP) has set the  following guidelines (reference values) for HDL Cholesterol:  Low...............<40 mg/dL  Optimal...........>60 mg/dL      LDL Cholesterol 07/08/2022 121.8  63.0 - 159.0 mg/dL Final    Comment: The National Cholesterol Education Program (NCEP) has set the  following guidelines (reference values) for LDL Cholesterol:  Optimal.......................<130 mg/dL  Borderline High...............130-159 mg/dL  High..........................160-189 mg/dL  Very High.....................>190 mg/dL      HDL/Cholesterol Ratio 07/08/2022 24.2  20.0 - 50.0 % Final    Total Cholesterol/HDL Ratio 07/08/2022 4.1  2.0 - 5.0 Final    Non-HDL Cholesterol 07/08/2022 157  mg/dL Final    Comment: Risk category and Non-HDL cholesterol goals:  Coronary heart disease (CHD)or equivalent (10-year risk of CHD >20%):  Non-HDL cholesterol goal     <130 mg/dL  Two or more CHD risk factors and 10-year risk of CHD <= 20%:  Non-HDL cholesterol goal     <160 mg/dL  0 to 1 CHD risk factor:  Non-HDL cholesterol goal     <190 mg/dL         [phone] 11/15/2021 Routine     Narrative & Impression  EXAMINATION:  US RETROPERITONEAL COMPLETE     CLINICAL HISTORY:  Chronic kidney disease, stage 3a     TECHNIQUE:  Ultrasound of the kidneys and urinary bladder was performed including color flow and Doppler evaluation of the kidneys.     FINDINGS:  The right and left kidneys measure 10.1 and 9.9 cm respectively.  The right and left resistive indices measure 0.83 and 0.82 respectively which is elevated consistent with medical renal disease.  There is no hydronephrosis or nephrolithiasis.  There is subcentimeter hypoechoic foci within the right and left kidneys which are too small to characterize.  The bladder is unremarkable.     Impression:     Elevated resistive indices consistent with medical renal disease.        Electronically  signed by: Jefe Quiñones MD  Date:                                            11/15/2021  Time:                                           11:29        ASSESSMENT/PLAN:  Problem List Items Addressed This Visit        Neuro    Vascular dementia without behavioral disturbance - Primary    Overview     - 1/24/2020 MMSE 23  - 7/14/2021 MMSE 19/30  - triedl Aricept and pt did to not tolerate  - patient declined medication and family is supportive. Today she is willing to try medication and will add Namenda 5 mg daily renal dosing  - supportive care  - counseling with anticipatory guidance to rogelio Moffett  - doing well and good support           Relevant Medications    memantine (NAMENDA) 5 MG Tab       Psychiatric    Benzodiazepine dependence    Overview     - she was unable to tolerate off of alprazolam  - okay to continue alprazolam 0.25-0.5 mg nightly as needed  -  reviewed and filled 6/13/22           Generalized anxiety disorder    Overview     - with benzodiazepine dependence   - continue Sertraline 50 mg daily           Relevant Medications    sertraline (ZOLOFT) 50 MG tablet    Recurrent major depressive disorder, in full remission    Overview     - well controlled  - continue current medication           Relevant Medications    sertraline (ZOLOFT) 50 MG tablet       Cardiac/Vascular    Essential hypertension (Chronic)    Overview     - well controlled  - continue current medication           Relevant Medications    metoprolol succinate (TOPROL-XL) 25 MG 24 hr tablet    Hypercholesterolemia    Overview     Lab Results   Component Value Date    LDLCALC 121.8 07/08/2022     - well controlled  - continue current medication           Paroxysmal atrial fibrillation    Overview     - followed by Cardiology  - OAC warfarin discontinued by Cardiology 7/13/21 due to increase fall risk.  Note reviewed  - RRR today  - no longer on oral anticoagulation              Renal/    Stage 3b chronic kidney disease    Overview      Lab Results   Component Value Date    CREATININE 1.26 07/08/2022     - 11/15/21 Renal US:  Medical renal disease  - baseline creatinine 1.1-1.2  - baseline creatinine 40-45  - low-salt diet  - good water hydration  - avoid nephrotoxic medication  - worsening           Relevant Orders    Basic Metabolic Panel       GI    Gastroesophageal reflux disease without esophagitis (Chronic)    Overview     - well controlled  - continue current management plan   - patient encouraged to notify me with any changes  - has worsened when she attempts to stop omeprazole.           Relevant Medications    omeprazole (PRILOSEC) 40 MG capsule          ORDERS:   Orders Placed This Encounter    Basic Metabolic Panel    metoprolol succinate (TOPROL-XL) 25 MG 24 hr tablet    omeprazole (PRILOSEC) 40 MG capsule    sertraline (ZOLOFT) 50 MG tablet    memantine (NAMENDA) 5 MG Tab       Vaccines recommended:   Up to date    Today I discussed that I will no longer be practice at [facility] effective 1/2023 and assisted patient in establishing with a new provider for their follow-up to avoid any lapse of care. I discussed with Alessandra Hidalgo that it is important to maintain routine follow-ups with a new provider and encouraged to notify me with an questions or concerns prior to my departure.       Follow-up in 3 months with labs or sooner if any concerns.    This note is dictated using the M*Modal Fluency Direct word recognition program. There are word recognition mistakes that are occasionally missed on review.    Dr. Milli Rey D.O.   Family Medicine

## 2022-07-14 NOTE — PATIENT INSTRUCTIONS
Start Namenda 5 mg once a day to help memory and mood but if it does not improve or she has any side effects then stop it  Please notify me of any concerns

## 2022-11-04 NOTE — TELEPHONE ENCOUNTER
----- Message from Carolyn Hernandez sent at 1/28/2020  1:35 PM CST -----  Contact: [phone]/self  Type:  Test Results    Who Called: self  Name of Test (Lab/Mammo/Etc):lab  Date of Test: 01/24/2020  Ordering Provider:Dr. Rey  Where the test was performed: [facility]   Would the patient rather a call back or a response via [facility]?callback   Best Call Back Number: [phone]  Additional Information:N/A     Follow up with your urologist or in the Urology Clinic - call for an appointment. For pain use acetaminophen (Tylenol) or ibuprofen (Motrin / Advil), unless prescribed medications that have acetaminophen or ibuprofen (or similar medications) in it. You can take over the counter acetaminophen tablets (1 - 2 tablets of the 500-mg strength every 6 hours) or ibuprofen tablets (2 tablets every 4 hours). Drink plenty of water. Strain your urine for any stones (collect the stones and take them with you to the urologist    [address] for worsening symptoms, inability to urinate, worsening of blood in your urine, or if you develop any concerning symptoms such as: high fever not relieved by acetaminophen (Tylenol) and/or ibuprofen (Motrin / Advil), chills, shortness of breath, chest pain, feeling of your heart fluttering or racing, persistent nausea and/or vomiting, vomiting up blood, blood in your stool, numbness, loss of consciousness, weakness or tingling in the arms or legs or change in color of the extremities, changes in mental status, persistent headache, blurry vision, loss of bladder / bowel control, unable to follow up with your physician, or other any other care or concern.

## 2022-11-29 ENCOUNTER — TELEPHONE (OUTPATIENT)
Dept: FAMILY MEDICINE | Facility: CLINIC | Age: 87
End: 2022-11-29
Payer: MEDICARE

## 2022-11-29 DIAGNOSIS — F13.20 BENZODIAZEPINE DEPENDENCE: ICD-10-CM

## 2022-11-29 RX ORDER — ALPRAZOLAM 0.5 MG/1
TABLET ORAL
Qty: 30 TABLET | Refills: 0 | Status: SHIPPED | OUTPATIENT
Start: 2022-11-29 | End: 2022-12-13 | Stop reason: SDUPTHER

## 2022-11-29 NOTE — TELEPHONE ENCOUNTER
No new care gaps identified.  [facility] Embedded Care Gaps. Reference number: [id number]. 11/29/2022   8:20:43 AM CST

## 2022-11-29 NOTE — TELEPHONE ENCOUNTER
Please notify pt. The patient's last visit with me was on 7/14/2022.    Medications have been refilled for 30 days   For this medication Alessandra Hidalgo should be seen at a minimum 4 months.   Unfortunately I will no longer be practicing at [place] and they will need to establish with a new PCP for further refills  Orders Placed This Encounter    ALPRAZolam (XANAX) 0.5 MG tablet         Dr. Milli Rey D.O.   Family Medicine

## 2022-12-13 ENCOUNTER — OFFICE VISIT (OUTPATIENT)
Dept: PRIMARY CARE CLINIC | Facility: CLINIC | Age: 87
End: 2022-12-13
Attending: FAMILY MEDICINE
Payer: MEDICARE

## 2022-12-13 ENCOUNTER — TELEPHONE (OUTPATIENT)
Dept: PRIMARY CARE CLINIC | Facility: CLINIC | Age: 87
End: 2022-12-13
Payer: MEDICARE

## 2022-12-13 DIAGNOSIS — F41.1 GENERALIZED ANXIETY DISORDER: ICD-10-CM

## 2022-12-13 DIAGNOSIS — F33.42 RECURRENT MAJOR DEPRESSIVE DISORDER, IN FULL REMISSION: ICD-10-CM

## 2022-12-13 DIAGNOSIS — F13.20 BENZODIAZEPINE DEPENDENCE: ICD-10-CM

## 2022-12-13 DIAGNOSIS — F01.50 VASCULAR DEMENTIA WITHOUT BEHAVIORAL DISTURBANCE: Primary | ICD-10-CM

## 2022-12-13 PROCEDURE — 99397 PR PREVENTIVE VISIT,EST,65 & OVER: ICD-10-PCS | Mod: HCNC,95,, | Performed by: FAMILY MEDICINE

## 2022-12-13 PROCEDURE — 99499 UNLISTED E&M SERVICE: CPT | Mod: 95,,, | Performed by: FAMILY MEDICINE

## 2022-12-13 PROCEDURE — 1160F RVW MEDS BY RX/DR IN RCRD: CPT | Mod: HCNC,CPTII,95, | Performed by: FAMILY MEDICINE

## 2022-12-13 PROCEDURE — 1159F MED LIST DOCD IN RCRD: CPT | Mod: HCNC,CPTII,95, | Performed by: FAMILY MEDICINE

## 2022-12-13 PROCEDURE — 1160F PR REVIEW ALL MEDS BY PRESCRIBER/CLIN PHARMACIST DOCUMENTED: ICD-10-PCS | Mod: HCNC,CPTII,95, | Performed by: FAMILY MEDICINE

## 2022-12-13 PROCEDURE — 99499 RISK ADDL DX/OHS AUDIT: ICD-10-PCS | Mod: 95,,, | Performed by: FAMILY MEDICINE

## 2022-12-13 PROCEDURE — 1159F PR MEDICATION LIST DOCUMENTED IN MEDICAL RECORD: ICD-10-PCS | Mod: HCNC,CPTII,95, | Performed by: FAMILY MEDICINE

## 2022-12-13 PROCEDURE — 99397 PER PM REEVAL EST PAT 65+ YR: CPT | Mod: HCNC,95,, | Performed by: FAMILY MEDICINE

## 2022-12-13 RX ORDER — ALPRAZOLAM 0.5 MG/1
TABLET ORAL
Qty: 30 TABLET | Refills: 5 | Status: SHIPPED | OUTPATIENT
Start: 2022-12-13 | End: 2023-06-19 | Stop reason: SDUPTHER

## 2022-12-13 NOTE — TELEPHONE ENCOUNTER
----- Message from Milli Rey, DO sent at 12/13/2022  7:41 AM CST -----  Regarding: Please call patient and see if they want to do virtual  Please call patient and see if they want to do virtual . She is homebound and lives in [place], [place]. Son brings her. I can do an Audio visit if they do not think that they can do virtual

## 2022-12-13 NOTE — Clinical Note
Please scheduled 4 month follow-up with Dr. Hudson to establish care. Son reports okay to schedule any date and time and just send him [facility] [employer] message via her portal of date and time

## 2022-12-13 NOTE — PROGRESS NOTES
VIRTUAL/TELEMEDICINE VISIT   FAMILY MEDICINE   [facility]    The patient location is: [place]  The chief complaint leading to consultation is: follow-up benzodiazepine and dementia  Visit type: Virtual visit with synchronous audio and video Doximity  Total time spent: 30 minute  Each patient to whom he or she provides medical services by telemedicine is:  (1) informed of the relationship between the physician and patient and the respective role of any other health care provider with respect to management of the patient; and (2) notified that he or she may decline to receive medical services by telemedicine and may withdraw from such care at any time.    Reason for visit:   Chief Complaint   Patient presents with    Medication Refill       SUBJECTIVE: Alessandra Hidalgo is a 87 y.o. female  - with hypertension, hyperlipidemia, history of pulmonary embolism (2016) on OAC Warfarin, emphysema, depression, anxiety on chronic benzodiazepine for sleep, CKD stage 3, pAfib (followed by Dr. Powers [facility]), mild cognitive impairment,chronic knee pain with history of knee replacement and GERD presents to follow-up medication    Cardiologist: Dr. [facility]  [facility]  [facility] Dr. More     She lives with her son (Zuhair) who assisted with the visit.     Today she reports that she doing well. She is stable on her medications. Son notes that memory is worse at times but she does not have any behavioral issues and sleeps well.     1. Depression and Anxiety  - mild cognitive impairment 1/24/2020 MMSE 23/30     Prior treatments: Duloxetine 60 mg daily, Prozac (dizziness)  Side effects of prior treatment: lightheaded, dizziness, not effective     Current treatment:   ALPRAZolam (XANAX) 0.5 MG tablet, Take 1 tablet (0.5 mg total) by mouth nightly as needed for Anxiety., Disp: 30 tablet, Rfl: 2  - has been unable to wean off of medication  -  reviewed  sertraline (ZOLOFT) 50 MG tablet,  "Take 1 tablet (50 mg total) by mouth once daily., Disp: 90 tablet, Rfl: 1    Prior medication:  Donepezil 5 mg at bedtime-patient discontinued due to poor sleep    Side effects of current treatment: denies     Panic attacks: denies   Hopelessness: denies  Sleep: no issues and reports that sleep is "good"  Suicidal thoughts: denies  Thoughts of self harm:denies  Thoughts of harm to others: denies  History of suicide attempts: denies  Family history of suicide:denies     Support system: family  Counselor: none    2.Dementia  - Initial assessment 2020: "Son reports difficulty with short term memory that has been present since pulmonary embolism with sudden cardiac arrest 2016 following left knee replacement. He reports that long term seem good but short term not as good. She will often ask him the same question over and over again. He does not assist with medications so unsure if she is taking constantly but she is able to state her medication and how she takes them"     Age diagnosed: 83 yo  Neurology or Neuropsychiatry evaluation: none     Last MMSE: 1/24/2020 23/30    Current symptoms: short term memory loss without behavioral disturbances or sleep issues  Behavioral disturbanced: none  Sleep: none    Current medication regimen:   ALPRAZolam (XANAX) 0.5 MG tablet, Take 1 tablet (0.5 mg total) by mouth nightly as needed for Anxiety., Disp: 30 tablet, Rfl: 2  sertraline (ZOLOFT) 50 MG tablet, Take 1 tablet (50 mg total) by mouth once daily., Disp:   memantine (NAMENDA) 5 MG Tab, Take 1 tablet (5 mg total) by mouth once daily.,     Prior medication:  Did not tolerate donepezil  5 mg at bedtime (patient discontinued due to sleep issues related with medication)    Living situation: son Stan) lives with her  DME: rollator      Review of Systems   HENT:  Positive for hearing loss.    Eyes:  Negative for discharge.   Respiratory:  Negative for wheezing.    Cardiovascular:  Negative for chest pain and palpitations. "   Gastrointestinal:  Negative for blood in stool, constipation, diarrhea and vomiting.   Genitourinary:  Negative for dysuria and hematuria.   Musculoskeletal:  Negative for neck pain.   Neurological:  Positive for headaches.   Endo/Heme/Allergies:  Negative for polydipsia.   All other systems reviewed and are negative.      HISTORY:   Past Medical History:   Diagnosis Date    Anxiety     Hyperlipidemia     Hypertension     Pulmonary embolism        Past Surgical History:   Procedure Laterality Date    APPENDECTOMY      CATARACT EXTRACTION W/ INTRAOCULAR LENS  IMPLANT, BILATERAL      HYSTERECTOMY      Partial     LEFT KNEE REPLACEMENT         Family History   Problem Relation Age of Onset    Cancer Mother     Stroke Daughter     Heart disease Neg Hx        Social History     Tobacco Use    Smoking status: Former     Packs/day: 0.00     Years: 4.00     Pack years: 0.00     Types: Cigarettes    Smokeless tobacco: Former   Substance Use Topics    Alcohol use: Yes     Alcohol/week: 1.0 standard drink     Types: 1 Glasses of wine per week    Drug use: No       Social History     Social History Narrative    Lives with son Zuhair moved in 2019 to assist with care. 9/2021  passed away. They have 4 children. She is originally from [place]. She is sedentary. 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 DME: rollator walker. Tub bench       ALLERGIES:   Review of patient's allergies indicates:   Allergen Reactions    Atorvastatin     Prozac [fluoxetine]      dizziness       MEDS:     Current Outpatient Medications:     ALPRAZolam (XANAX) 0.5 MG tablet, TAKE 1 TABLET BY MOUTH AT BEDTIME AS NEEDED FOR INSOMNIA OR ANXIETY., Disp: 30 tablet, Rfl: 5    diclofenac sodium (VOLTAREN) 1 % Gel, Apply 2 g topically once daily., Disp: 350 g, Rfl: 11    memantine (NAMENDA) 5 MG Tab, Take 1 tablet (5 mg total) by mouth once daily., Disp: 90 tablet, Rfl: 3    metoprolol succinate (TOPROL-XL) 25 MG 24  hr tablet, Take 1 tablet (25 mg total) by mouth once daily., Disp: 90 tablet, Rfl: 3    omeprazole (PRILOSEC) 40 MG capsule, TAKE 1 CAPSULE BY MOUTH ONCE DAILY, Disp: 90 capsule, Rfl: 3    rosuvastatin (CRESTOR) 10 MG tablet, TAKE 1 TABLET BY MOUTH ONCE DAILY FOR CHOLESTEROL, Disp: 90 tablet, Rfl: 2    sertraline (ZOLOFT) 50 MG tablet, TAKE 1 TABLET BY MOUTH ONCE DAILY, Disp: 90 tablet, Rfl: 2    Vital signs:   There were no vitals filed for this visit.  There is no height or weight on file to calculate BMI.    PHYSICAL EXAM:     Physical Exam  Constitutional:       General: She is not in acute distress.  Pulmonary:      Effort: Pulmonary effort is normal. No respiratory distress.   Neurological:      Mental Status: She is alert.   Psychiatric:         Speech: Speech normal.         PHQ4 = PHQ-4 Score: 4    PERTINENT RESULTS:   No visits with results within 1 Week(s) from this visit.   Latest known visit with results is:   Lab Visit on 07/08/2022   Component Date Value Ref Range Status    Sodium 07/08/2022 139  136 - 145 mmol/L Final    Potassium 07/08/2022 4.9  3.5 - 5.1 mmol/L Final    Chloride 07/08/2022 102  95 - 110 mmol/L Final    CO2 07/08/2022 28  23 - 29 mmol/L Final    Glucose 07/08/2022 102  70 - 110 mg/dL Final    BUN 07/08/2022 22 (H)  7 - 17 mg/dL Final    Creatinine 07/08/2022 1.26  0.50 - 1.40 mg/dL Final    Calcium 07/08/2022 9.6  8.7 - 10.5 mg/dL Final    Total Protein 07/08/2022 7.7  6.0 - 8.4 g/dL Final    Albumin 07/08/2022 4.6  3.5 - 5.2 g/dL Final    Total Bilirubin 07/08/2022 0.7  0.1 - 1.0 mg/dL Final    Comment: For infants and newborns, interpretation of results should be based  on gestational age, weight and in agreement with clinical  observations.    Premature Infant recommended reference ranges:  Up to 24 hours.............<8.0 mg/dL  Up to 48 hours............<12.0 mg/dL  3-5 days..................<15.0 mg/dL  6-29 days.................<15.0 mg/dL      Alkaline Phosphatase 07/08/2022  105  38 - 126 U/L Final    AST 07/08/2022 23  15 - 46 U/L Final    ALT 07/08/2022 13  10 - 44 U/L Final    Anion Gap 07/08/2022 9  8 - 16 mmol/L Final    eGFR if African American 07/08/2022 45.2 (A)  >60 mL/min/1.73 m^2 Final    eGFR if non  07/08/2022 39.2 (A)  >60 mL/min/1.73 m^2 Final    Comment: Calculation used to obtain the estimated glomerular filtration  rate (eGFR) is the CKD-EPI equation.       WBC 07/08/2022 8.91  3.90 - 12.70 K/uL Final    RBC 07/08/2022 4.47  4.00 - 5.40 M/uL Final    Hemoglobin 07/08/2022 13.4  12.0 - 16.0 g/dL Final    Hematocrit 07/08/2022 41.2  37.0 - 48.5 % Final    MCV 07/08/2022 92  82 - 98 fL Final    MCH 07/08/2022 30.0  27.0 - 31.0 pg Final    MCHC 07/08/2022 32.5  32.0 - 36.0 g/dL Final    RDW 07/08/2022 14.8 (H)  11.5 - 14.5 % Final    Platelets 07/08/2022 302  150 - 450 K/uL Final    MPV 07/08/2022 11.3  9.2 - 12.9 fL Final    Cholesterol 07/08/2022 207 (H)  120 - 199 mg/dL Final    Comment: The National Cholesterol Education Program (NCEP) has set the  following guidelines (reference ranges) for Cholesterol:  Optimal.....................<200 mg/dL  Borderline High.............200-239 mg/dL  High........................> or = 240 mg/dL      Triglycerides 07/08/2022 176 (H)  30 - 150 mg/dL Final    Comment: The National Cholesterol Education Program (NCEP) has set the  following guidelines (reference values) for triglycerides:  Normal......................<150 mg/dL  Borderline High.............150-199 mg/dL  High........................200-499 mg/dL      HDL 07/08/2022 50  40 - 75 mg/dL Final    Comment: The National Cholesterol Education Program (NCEP) has set the  following guidelines (reference values) for HDL Cholesterol:  Low...............<40 mg/dL  Optimal...........>60 mg/dL      LDL Cholesterol 07/08/2022 121.8  63.0 - 159.0 mg/dL Final    Comment: The National Cholesterol Education Program (NCEP) has set the  following guidelines (reference values)  for LDL Cholesterol:  Optimal.......................<130 mg/dL  Borderline High...............130-159 mg/dL  High..........................160-189 mg/dL  Very High.....................>190 mg/dL      HDL/Cholesterol Ratio 07/08/2022 24.2  20.0 - 50.0 % Final    Total Cholesterol/HDL Ratio 07/08/2022 4.1  2.0 - 5.0 Final    Non-HDL Cholesterol 07/08/2022 157  mg/dL Final    Comment: Risk category and Non-HDL cholesterol goals:  Coronary heart disease (CHD)or equivalent (10-year risk of CHD >20%):  Non-HDL cholesterol goal     <130 mg/dL  Two or more CHD risk factors and 10-year risk of CHD <= 20%:  Non-HDL cholesterol goal     <160 mg/dL  0 to 1 CHD risk factor:  Non-HDL cholesterol goal     <190 mg/dL         ASSESSMENT/PLAN:  1. Vascular dementia without behavioral disturbance  Overview:  - 1/24/2020 MMSE 23  - 7/14/2021 MMSE 19/30  - triedl Aricept and pt did to not tolerate  - patient declined medication and family is supportive. Today she is willing to try medication and will add Namenda 5 mg daily renal dosing  - supportive care  - counseling with anticipatory guidance to rogelio Moffett  - doing well and good support      2. Generalized anxiety disorder  Overview:  - with benzodiazepine dependence   - continue Sertraline 50 mg daily      3. Recurrent major depressive disorder, in full remission  Overview:  - well controlled  - continue current medication      4. Benzodiazepine dependence  Overview:  - she was unable to tolerate off of alprazolam  - okay to continue alprazolam 0.25-0.5 mg nightly as needed  -  reviewed and filled 6/13/22    Orders:  -     ALPRAZolam (XANAX) 0.5 MG tablet; TAKE 1 TABLET BY MOUTH AT BEDTIME AS NEEDED FOR INSOMNIA OR ANXIETY.  Dispense: 30 tablet; Refill: 5            ORDERS:   Orders Placed This Encounter    ALPRAZolam (XANAX) 0.5 MG tablet       Vaccines recommended: covid-19 booster, flu vaccine    Follow-up in 4 months with local PCP [facility] or sooner if any concerns.      This  note is dictated using the M*Modal Fluency Direct word recognition program. There are word recognition mistakes that are occasionally missed on review.    Dr. Milli Rey D.O.   [facility]

## 2023-02-07 DIAGNOSIS — Z00.00 ENCOUNTER FOR MEDICARE ANNUAL WELLNESS EXAM: ICD-10-CM

## 2023-02-09 DIAGNOSIS — Z00.00 ENCOUNTER FOR MEDICARE ANNUAL WELLNESS EXAM: ICD-10-CM

## 2023-04-11 ENCOUNTER — OFFICE VISIT (OUTPATIENT)
Dept: FAMILY MEDICINE | Facility: CLINIC | Age: 88
End: 2023-04-11
Payer: MEDICARE

## 2023-04-11 VITALS
HEART RATE: 75 BPM | DIASTOLIC BLOOD PRESSURE: 60 MMHG | WEIGHT: 158.75 LBS | BODY MASS INDEX: 28.13 KG/M2 | SYSTOLIC BLOOD PRESSURE: 120 MMHG | HEIGHT: 63 IN | OXYGEN SATURATION: 96 %

## 2023-04-11 DIAGNOSIS — Z86.711 HISTORY OF PULMONARY EMBOLISM: Chronic | ICD-10-CM

## 2023-04-11 DIAGNOSIS — I48.0 PAROXYSMAL ATRIAL FIBRILLATION: ICD-10-CM

## 2023-04-11 DIAGNOSIS — I10 ESSENTIAL HYPERTENSION: Primary | Chronic | ICD-10-CM

## 2023-04-11 DIAGNOSIS — K21.9 GASTROESOPHAGEAL REFLUX DISEASE WITHOUT ESOPHAGITIS: Chronic | ICD-10-CM

## 2023-04-11 DIAGNOSIS — E78.00 HYPERCHOLESTEROLEMIA: ICD-10-CM

## 2023-04-11 DIAGNOSIS — F33.42 RECURRENT MAJOR DEPRESSIVE DISORDER, IN FULL REMISSION: ICD-10-CM

## 2023-04-11 DIAGNOSIS — F01.50 VASCULAR DEMENTIA WITHOUT BEHAVIORAL DISTURBANCE: ICD-10-CM

## 2023-04-11 DIAGNOSIS — F13.20 BENZODIAZEPINE DEPENDENCE: ICD-10-CM

## 2023-04-11 DIAGNOSIS — N18.32 STAGE 3B CHRONIC KIDNEY DISEASE: ICD-10-CM

## 2023-04-11 DIAGNOSIS — F41.1 GENERALIZED ANXIETY DISORDER: ICD-10-CM

## 2023-04-11 PROCEDURE — 99999 PR PBB SHADOW E&M-EST. PATIENT-LVL III: CPT | Mod: PBBFAC,HCNC,, | Performed by: FAMILY MEDICINE

## 2023-04-11 PROCEDURE — 3288F PR FALLS RISK ASSESSMENT DOCUMENTED: ICD-10-PCS | Mod: HCNC,CPTII,S$GLB, | Performed by: FAMILY MEDICINE

## 2023-04-11 PROCEDURE — 1125F PR PAIN SEVERITY QUANTIFIED, PAIN PRESENT: ICD-10-PCS | Mod: HCNC,CPTII,S$GLB, | Performed by: FAMILY MEDICINE

## 2023-04-11 PROCEDURE — 1125F AMNT PAIN NOTED PAIN PRSNT: CPT | Mod: HCNC,CPTII,S$GLB, | Performed by: FAMILY MEDICINE

## 2023-04-11 PROCEDURE — 99999 PR PBB SHADOW E&M-EST. PATIENT-LVL III: ICD-10-PCS | Mod: PBBFAC,HCNC,, | Performed by: FAMILY MEDICINE

## 2023-04-11 PROCEDURE — 3074F SYST BP LT 130 MM HG: CPT | Mod: HCNC,CPTII,S$GLB, | Performed by: FAMILY MEDICINE

## 2023-04-11 PROCEDURE — 1159F MED LIST DOCD IN RCRD: CPT | Mod: HCNC,CPTII,S$GLB, | Performed by: FAMILY MEDICINE

## 2023-04-11 PROCEDURE — 1101F PT FALLS ASSESS-DOCD LE1/YR: CPT | Mod: HCNC,CPTII,S$GLB, | Performed by: FAMILY MEDICINE

## 2023-04-11 PROCEDURE — 99214 OFFICE O/P EST MOD 30 MIN: CPT | Mod: HCNC,S$GLB,, | Performed by: FAMILY MEDICINE

## 2023-04-11 PROCEDURE — 3074F PR MOST RECENT SYSTOLIC BLOOD PRESSURE < 130 MM HG: ICD-10-PCS | Mod: HCNC,CPTII,S$GLB, | Performed by: FAMILY MEDICINE

## 2023-04-11 PROCEDURE — 1160F RVW MEDS BY RX/DR IN RCRD: CPT | Mod: HCNC,CPTII,S$GLB, | Performed by: FAMILY MEDICINE

## 2023-04-11 PROCEDURE — 99214 PR OFFICE/OUTPT VISIT, EST, LEVL IV, 30-39 MIN: ICD-10-PCS | Mod: HCNC,S$GLB,, | Performed by: FAMILY MEDICINE

## 2023-04-11 PROCEDURE — 1159F PR MEDICATION LIST DOCUMENTED IN MEDICAL RECORD: ICD-10-PCS | Mod: HCNC,CPTII,S$GLB, | Performed by: FAMILY MEDICINE

## 2023-04-11 PROCEDURE — 1101F PR PT FALLS ASSESS DOC 0-1 FALLS W/OUT INJ PAST YR: ICD-10-PCS | Mod: HCNC,CPTII,S$GLB, | Performed by: FAMILY MEDICINE

## 2023-04-11 PROCEDURE — 3078F DIAST BP <80 MM HG: CPT | Mod: HCNC,CPTII,S$GLB, | Performed by: FAMILY MEDICINE

## 2023-04-11 PROCEDURE — 3288F FALL RISK ASSESSMENT DOCD: CPT | Mod: HCNC,CPTII,S$GLB, | Performed by: FAMILY MEDICINE

## 2023-04-11 PROCEDURE — 1160F PR REVIEW ALL MEDS BY PRESCRIBER/CLIN PHARMACIST DOCUMENTED: ICD-10-PCS | Mod: HCNC,CPTII,S$GLB, | Performed by: FAMILY MEDICINE

## 2023-04-11 PROCEDURE — 3078F PR MOST RECENT DIASTOLIC BLOOD PRESSURE < 80 MM HG: ICD-10-PCS | Mod: HCNC,CPTII,S$GLB, | Performed by: FAMILY MEDICINE

## 2023-04-11 RX ORDER — MEMANTINE HYDROCHLORIDE 5 MG/1
5 TABLET ORAL DAILY
Qty: 90 TABLET | Refills: 3 | Status: SHIPPED | OUTPATIENT
Start: 2023-04-11 | End: 2023-10-11 | Stop reason: SDUPTHER

## 2023-04-11 RX ORDER — SERTRALINE HYDROCHLORIDE 50 MG/1
50 TABLET, FILM COATED ORAL DAILY
Qty: 90 TABLET | Refills: 3 | Status: SHIPPED | OUTPATIENT
Start: 2023-04-11 | End: 2023-06-20

## 2023-04-11 RX ORDER — METOPROLOL SUCCINATE 25 MG/1
25 TABLET, EXTENDED RELEASE ORAL DAILY
Qty: 90 TABLET | Refills: 3 | Status: SHIPPED | OUTPATIENT
Start: 2023-04-11 | End: 2023-10-11 | Stop reason: SDUPTHER

## 2023-04-11 RX ORDER — OMEPRAZOLE 40 MG/1
40 CAPSULE, DELAYED RELEASE ORAL DAILY
Qty: 90 CAPSULE | Refills: 3 | Status: SHIPPED | OUTPATIENT
Start: 2023-04-11 | End: 2023-12-28 | Stop reason: SDUPTHER

## 2023-04-11 RX ORDER — ALPRAZOLAM 0.5 MG/1
TABLET ORAL
Qty: 30 TABLET | Refills: 0 | Status: CANCELLED | OUTPATIENT
Start: 2023-04-11

## 2023-04-11 RX ORDER — ROSUVASTATIN CALCIUM 10 MG/1
10 TABLET, COATED ORAL DAILY
Qty: 90 TABLET | Refills: 3 | Status: SHIPPED | OUTPATIENT
Start: 2023-04-11 | End: 2023-10-11 | Stop reason: SDUPTHER

## 2023-04-11 NOTE — PROGRESS NOTES
PATIENT VISIT FAMILY MEDICINE    CC:   Chief Complaint   Patient presents with    [facility] Care    Follow-up    Hypertension       HPI: Alessandra Hidalgo  is a 87 y.o. female:    Patient is new to me.  Patient presents with family member/caretaker for routine follow up on chronic conditions.    Patient doing well overall, taking medications as prescribed and with no acute concerns.       ROS: Review of Systems   Constitutional:  Negative for fever.   Respiratory:  Negative for shortness of breath.    Cardiovascular:  Negative for chest pain.   Musculoskeletal:  Positive for joint pain.     PMHX:   Past Medical History:   Diagnosis Date    Anxiety     Hyperlipidemia     Hypertension     Pulmonary embolism        PSHX:   Past Surgical History:   Procedure Laterality Date    APPENDECTOMY      CATARACT EXTRACTION W/ INTRAOCULAR LENS  IMPLANT, BILATERAL      HYSTERECTOMY      Partial     LEFT KNEE REPLACEMENT         FAMHX:   Family History   Problem Relation Age of Onset    Cancer Mother     Stroke Daughter     Heart disease Neg Hx        SOCHX:   Social History     Socioeconomic History    Marital status:    Tobacco Use    Smoking status: Former     Packs/day: 0.00     Years: 4.00     Pack years: 0.00     Types: Cigarettes     Passive exposure: Past    Smokeless tobacco: Former   Substance and Sexual Activity    Alcohol use: Yes     Alcohol/week: 1.0 standard drink     Types: 1 Glasses of wine per week    Drug use: No    Sexual activity: Not Currently   Social History Narrative    Lives with son Zuhair moved in 2019 to assist with care. 9/2021  passed away. They have 4 children. She is originally from [place]. She is sedentary. 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 DME: rollator walker. Tub bench       ALLERGIES:   Review of patient's allergies indicates:   Allergen Reactions    Atorvastatin     Prozac [fluoxetine]      dizziness       MEDS:   Current Outpatient  "Medications:     ALPRAZolam (XANAX) 0.5 MG tablet, TAKE 1 TABLET BY MOUTH AT BEDTIME AS NEEDED FOR INSOMNIA OR ANXIETY., Disp: 30 tablet, Rfl: 5    diclofenac sodium (VOLTAREN) 1 % Gel, Apply 2 g topically once daily., Disp: 350 g, Rfl: 11    memantine (NAMENDA) 5 MG Tab, Take 1 tablet (5 mg total) by mouth once daily., Disp: 90 tablet, Rfl: 3    metoprolol succinate (TOPROL-XL) 25 MG 24 hr tablet, Take 1 tablet (25 mg total) by mouth once daily., Disp: 90 tablet, Rfl: 3    omeprazole (PRILOSEC) 40 MG capsule, Take 1 capsule (40 mg total) by mouth once daily., Disp: 90 capsule, Rfl: 3    rosuvastatin (CRESTOR) 10 MG tablet, Take 1 tablet (10 mg total) by mouth once daily., Disp: 90 tablet, Rfl: 3    sertraline (ZOLOFT) 50 MG tablet, Take 1 tablet (50 mg total) by mouth once daily., Disp: 90 tablet, Rfl: 3    OBJECTIVE:   Vitals:    04/11/23 1026   BP: 120/60   BP Location: Left arm   Patient Position: Sitting   BP Method: Large (Manual)   Pulse: 75   SpO2: 96%   Weight: 72 kg (158 lb 11.7 oz)   Height: 5' 3" (1.6 m)     Body mass index is 28.12 kg/m².      Physical Exam  Vitals and nursing note reviewed.   Constitutional:       Appearance: Normal appearance.   HENT:      Head: Normocephalic.   Eyes:      General:         Right eye: No discharge.         Left eye: No discharge.      Extraocular Movements: Extraocular movements intact.   Cardiovascular:      Rate and Rhythm: Normal rate and regular rhythm.   Pulmonary:      Effort: Pulmonary effort is normal.      Breath sounds: Normal breath sounds.   Skin:     Comments: No obvious rash on exposed skin   Neurological:      Mental Status: She is alert.      Comments: Alert, oriented to person, situation, place but not to date, season   Psychiatric:         Behavior: Behavior normal.         LABS:   A1C:      CBC:  Recent Labs   Lab 07/08/22  1058   WBC 8.91   RBC 4.47   Hemoglobin 13.4   Hematocrit 41.2   Platelets 302   MCV 92   MCH 30.0   MCHC 32.5     CMP:  Recent " Labs   Lab 07/08/22  1058   Glucose 102   Calcium 9.6   Albumin 4.6   Total Protein 7.7   Sodium 139   Potassium 4.9   CO2 28   Chloride 102   BUN 22 H   Creatinine 1.26   Alkaline Phosphatase 105   ALT 13   AST 23   Total Bilirubin 0.7     LIPIDS:  Recent Labs   Lab 07/08/22  1058   HDL 50   Cholesterol 207 H   Triglycerides 176 H   LDL Cholesterol 121.8   HDL/Cholesterol Ratio 24.2   Non-HDL Cholesterol 157   Total Cholesterol/HDL Ratio 4.1     TSH:          ASSESSMENT & PLAN:    Problem List Items Addressed This Visit          Neuro    Vascular dementia without behavioral disturbance (Chronic)    Overview     - 1/24/2020 MMSE 23  - 7/14/2021 MMSE 19/30  - triedl Aricept and pt did to not tolerate  - patient declined medication and family is supportive. Today she is willing to try medication and will add Namenda 5 mg daily renal dosing  - supportive care  - counseling with anticipatory guidance to rogelio Moffett  - doing well and good support           Relevant Medications    memantine (NAMENDA) 5 MG Tab       Psychiatric    Benzodiazepine dependence (Chronic)    Overview     - she was unable to tolerate off of alprazolam  - okay to continue alprazolam 0.25-0.5 mg nightly as needed  -  reviewed and as expected           Generalized anxiety disorder (Chronic)    Overview     - with benzodiazepine dependence   - continue Sertraline 50 mg daily           Relevant Medications    sertraline (ZOLOFT) 50 MG tablet    Recurrent major depressive disorder, in full remission (Chronic)    Overview     - well controlled  - continue current medication         Relevant Medications    sertraline (ZOLOFT) 50 MG tablet       Cardiac/Vascular    Essential hypertension - Primary (Chronic)    Overview     - well controlled  - continue current medication         Relevant Medications    metoprolol succinate (TOPROL-XL) 25 MG 24 hr tablet    Hypercholesterolemia (Chronic)    Overview     - well controlled  - continue current medication          Relevant Medications    rosuvastatin (CRESTOR) 10 MG tablet    Other Relevant Orders    Lipid Panel    Paroxysmal atrial fibrillation (Chronic)    Overview     - followed by Cardiology  - OAC warfarin discontinued by Cardiology 7/13/21 due to increase fall risk.  Note reviewed  - RRR today  - no longer on oral anticoagulation              Renal/    Stage 3b chronic kidney disease (Chronic)    Overview     - 11/15/21 Renal US:  Medical renal disease  - baseline creatinine 1.1-1.2  - baseline creatinine 40-45  - low-salt diet  - good water hydration  - avoid nephrotoxic medication  - worsening         Relevant Orders    Basic Metabolic Panel    CBC Auto Differential    Comprehensive Metabolic Panel       Hematology    History of pulmonary embolism (Chronic)    Overview     - 2016 s/p knee surgery with cardiac arrest  - oral anticoagulation discontinued 07/2021              GI    Gastroesophageal reflux disease without esophagitis (Chronic)    Overview     - well controlled  - continue current management plan   - patient encouraged to notify me with any changes  - has worsened when she attempts to stop omeprazole.         Relevant Medications    omeprazole (PRILOSEC) 40 MG capsule         Follow up in about 6 months (around 10/11/2023) for blood pressure.      RTC/ED precautions discussed where applicable.   Encouraged patient to let me know if there are any further questions/concerns.     Advise patient/caretaker to check with insurance regarding orders to avoid unexpected fees/costs.     The patient/caretaker indicates understanding of these issues and agrees with the plan.    Dr. Quoc Hudson MD  Family Medicine

## 2023-06-19 DIAGNOSIS — F13.20 BENZODIAZEPINE DEPENDENCE: ICD-10-CM

## 2023-06-19 NOTE — TELEPHONE ENCOUNTER
Care Due:                  Date            Visit Type   Department     Provider  --------------------------------------------------------------------------------                                EP -                              PRIMARY [facility]  Last Visit: 04-      CARE ([facility])   [facility]  [place]                               -                              PRIMARY      [facility]  Next Visit: 10-      [facility] ([facility])   [facility]                                                            Last  Test          Frequency    Reason                     Performed    Due Date  --------------------------------------------------------------------------------    CMP.........  12 months..  rosuvastatin.............  07- 07-    Lipid Panel.  12 months..  rosuvastatin.............  07- 07-    [facility] Embedded Care Due Messages. Reference number: [id number].   6/19/2023 3:53:18 PM CDT

## 2023-06-19 NOTE — TELEPHONE ENCOUNTER
Refill Encounter    PCP Visits: Recent Visits  Date Type Provider Dept   04/11/23 Office Visit Quoc Hudson MD [facility]   12/13/22 Office Visit Milli Rey DO [facility]   07/14/22 Office Visit Milli Rey,  [facility]   Showing recent visits within past 360 days and meeting all other requirements  Future Appointments  Date Type Provider Dept   10/11/23 Appointment Quoc Hudson MD [facility]   Showing future appointments within next 720 days and meeting all other requirements     Last 3 Blood Pressure:   BP Readings from Last 3 Encounters:   04/11/23 120/60   07/14/22 118/70   03/15/22 118/70     Preferred Pharmacy:   [id number] [place] - [id number] [address] [address]  [address] [address]  [place] [address]  Phone: [phone] Fax: [phone]    Requested RX:  Requested Prescriptions     Pending Prescriptions Disp Refills    ALPRAZolam (XANAX) 0.5 MG tablet 30 tablet 5     Sig: TAKE 1 TABLET BY MOUTH AT BEDTIME AS NEEDED FOR INSOMNIA OR ANXIETY.      RX Route: Normal

## 2023-06-20 RX ORDER — ALPRAZOLAM 0.5 MG/1
TABLET ORAL
Qty: 30 TABLET | Refills: 5 | Status: SHIPPED | OUTPATIENT
Start: 2023-06-20 | End: 2023-12-28 | Stop reason: SDUPTHER

## 2023-07-15 PROBLEM — I50.9 CHF (CONGESTIVE HEART FAILURE): Status: ACTIVE | Noted: 2023-07-15

## 2023-07-15 PROBLEM — R79.89 ELEVATED BRAIN NATRIURETIC PEPTIDE (BNP) LEVEL: Status: ACTIVE | Noted: 2023-07-15

## 2023-07-15 PROBLEM — F41.9 ANXIETY AND DEPRESSION: Status: ACTIVE | Noted: 2023-07-15

## 2023-07-15 PROBLEM — F32.A ANXIETY AND DEPRESSION: Status: ACTIVE | Noted: 2023-07-15

## 2023-07-15 PROBLEM — J96.01 ACUTE RESPIRATORY FAILURE WITH HYPOXIA: Status: ACTIVE | Noted: 2023-07-15

## 2023-07-15 PROBLEM — R94.31 ABNORMAL ELECTROCARDIOGRAM (ECG) (EKG): Status: ACTIVE | Noted: 2023-07-15

## 2023-07-16 PROBLEM — R82.90 ABNORMAL URINALYSIS: Status: ACTIVE | Noted: 2023-07-16

## 2023-07-17 PROBLEM — I27.20 PULMONARY HYPERTENSION: Status: ACTIVE | Noted: 2023-07-17

## 2023-07-17 PROBLEM — I26.92 ACUTE SADDLE PULMONARY EMBOLISM WITHOUT ACUTE COR PULMONALE: Status: ACTIVE | Noted: 2023-07-17

## 2023-07-18 ENCOUNTER — TELEPHONE (OUTPATIENT)
Dept: CARDIOLOGY | Facility: CLINIC | Age: 88
End: 2023-07-18
Payer: MEDICARE

## 2023-07-18 PROBLEM — N39.0 URINARY TRACT INFECTION WITHOUT HEMATURIA: Status: ACTIVE | Noted: 2023-07-16

## 2023-07-18 PROBLEM — R94.31 ABNORMAL ELECTROCARDIOGRAM (ECG) (EKG): Status: RESOLVED | Noted: 2023-07-15 | Resolved: 2023-07-18

## 2023-07-18 NOTE — TELEPHONE ENCOUNTER
----- Message from Richard Arredondo sent at 7/18/2023 10:05 AM CDT -----  Contact: LISA  Type:  Sooner Apoointment Request    Caller is requesting a sooner appointment.  Caller declined first available appointment listed below.  Caller will not accept being placed on the waitlist and is requesting a message be sent to doctor.  Name of Caller:Lisa (case management)   When is the first available appointment? N/a  Symptoms:hosp follow up  Would the patient rather a call back or a response via [facility]? call  Best Call Back Number:[phone]  Additional Information:         Dx influenza B on 1/25/18.  Started on oseltamivir in ICU, renally dosed.  Febrile overnight and blood cultures drawn  -Today is day 2 of oseltamivir  -f/u blood cx

## 2023-07-18 NOTE — TELEPHONE ENCOUNTER
----- Message from Richard Arredondo sent at 7/18/2023 10:05 AM CDT -----  Contact: LISA  Type:  Sooner Apoointment Request    Caller is requesting a sooner appointment.  Caller declined first available appointment listed below.  Caller will not accept being placed on the waitlist and is requesting a message be sent to doctor.  Name of Caller:Lisa (case management)   When is the first available appointment? N/a  Symptoms:hosp follow up  Would the patient rather a call back or a response via [facility]? call  Best Call Back Number:[phone]  Additional Information:

## 2023-07-20 ENCOUNTER — PATIENT OUTREACH (OUTPATIENT)
Dept: ADMINISTRATIVE | Facility: CLINIC | Age: 88
End: 2023-07-20
Payer: MEDICARE

## 2023-07-20 PROCEDURE — G0180 PR HOME HEALTH MD CERTIFICATION: ICD-10-PCS | Mod: ,,, | Performed by: INTERNAL MEDICINE

## 2023-07-20 PROCEDURE — G0180 MD CERTIFICATION HHA PATIENT: HCPCS | Mod: ,,, | Performed by: INTERNAL MEDICINE

## 2023-07-20 NOTE — PROGRESS NOTES
C3 nurse spoke with Alessandra Hidalgo's son Zuhair for a TCC post hospital discharge follow up call. The patient has a scheduled [facility] appointment with Lauren Caballero NP on 07/28/23 @ 1100.

## 2023-07-26 ENCOUNTER — TELEPHONE (OUTPATIENT)
Dept: FAMILY MEDICINE | Facility: CLINIC | Age: 88
End: 2023-07-26
Payer: MEDICARE

## 2023-07-26 NOTE — TELEPHONE ENCOUNTER
[facility] [place]  OSCAR Kumari Staff  Physical therapy requesting ok to reschedule evaluation to week of 7/24/23?

## 2023-07-27 ENCOUNTER — DOCUMENT SCAN (OUTPATIENT)
Dept: HOME HEALTH SERVICES | Facility: HOSPITAL | Age: 88
End: 2023-07-27
Payer: MEDICARE

## 2023-07-27 NOTE — PROGRESS NOTES
Subjective:         Chief Complaint: Hospital Follow Up  HPI   Alessandra Hidalgo is a 87 y.o. female, patient of Quoc Hudson MD with vascular dementia, recurrent major depressive disorder, anxiety, pulmonary hypertension, chronic rhinitis, atrial fib, hypertension, CKD stage 3b, GERD, osteoarthritis,   unknown to me, presents today with her daughter in law and grandson, sitting in a wheelchair, for a  Hospital Follow Up    Hospital f/u. Bilateral PE per CTA Chest. Reports she has some anxiety today bc she had to come to this visit. Has some anxiety at home at times with dizziness. Currently denies shortness of breath. Does report some chronic knee pain.   Feeling ok today. Family is helping with meds.     E.coli UTI. Was treated with augmentin BID x 5 days.     Transitional Care Note    Family and/or Caretaker present at visit?  Yes. Reports she has 24 hour care with her family.   Diagnostic tests reviewed/disposition: I have reviewed all completed as well as pending diagnostic tests at the time of discharge.  Disease/illness education: educated on UTI prevention, O2 saturation levels and when to notify healthcare providers.   Home health/community services discussion/referrals: Patient has home health established at [facility] . Home health nurse came out on 07/26 and someone with therapy already visited the house.   Establishment or re-establishment of referral orders for community resources: No other necessary community resources.   Discussion with other health care providers: No discussion with other health care providers necessary. Scheduled to see Dr. Betancur on 08/08.     Antibiotics completed. Reports she get up once at night to use the bathroom, urinates once or twice during the day.     Has large O2 tank at home and a portable; however, would like to have a smaller one to make transport easier as it is difficult for 1 person to transport her with the current tank.  Uses walker at home.           Past Medical  History:   Diagnosis Date    Anxiety and depression     Benzodiazepine dependence     CKD (chronic kidney disease) stage 3, GFR 30-59 ml/min     Baseline Cr 1-1.3    Hyperlipidemia     Hypertension     Paroxysmal atrial fibrillation     OAC warfarin discontinued by Cardiology 7/13/21 due to increase fall risk.  No longer on oral anticoagulation    Pulmonary embolism 2016    Vascular dementia without behavioral disturbance     Tried Aricept, but did not tolerate       Past Surgical History:   Procedure Laterality Date    APPENDECTOMY      CATARACT EXTRACTION W/ INTRAOCULAR LENS  IMPLANT, BILATERAL      HYSTERECTOMY      Partial     LEFT KNEE REPLACEMENT         Family History   Problem Relation Age of Onset    Cancer Mother     Stroke Daughter     Heart disease Neg Hx        Social History     Socioeconomic History    Marital status:    Tobacco Use    Smoking status: Former     Current packs/day: 0.00     Types: Cigarettes     Passive exposure: Past    Smokeless tobacco: Former   Substance and Sexual Activity    Alcohol use: Yes     Alcohol/week: 1.0 standard drink of alcohol     Types: 1 Glasses of wine per week    Drug use: No    Sexual activity: Not Currently   Social History Narrative    Lives with son Zuhair moved in 2019 to assist with care. 9/2021  passed away. They have 4 children. She is originally from [place]. She is sedentary. She is a practicing [employer] and she participates in [employer]. DME: rollator walker. Tub bench     Social Determinants of Health     Stress: Stress Concern Present (7/31/2020)    [place] [employer] - Occupational Stress Questionnaire     Feeling of Stress : Rather much       Review of Systems   Respiratory:  Negative for shortness of breath.    Psychiatric/Behavioral:  The patient is nervous/anxious.          Objective:     Vitals:    07/28/23 1049   BP: 126/68   BP Location: Left arm   Patient Position: Sitting   Pulse: 65   SpO2:  "97%   Weight: 68.7 kg (151 lb 8 oz)   Height: 5' 3" (1.6 m)          Physical Exam  Vitals reviewed.   Constitutional:       Appearance: Normal appearance. She is well-developed and well-groomed.   HENT:      Head: Normocephalic and atraumatic.      Right Ear: Decreased hearing noted.      Left Ear: Decreased hearing noted.   Eyes:      General: No scleral icterus.     Extraocular Movements: Extraocular movements intact.   Cardiovascular:      Rate and Rhythm: Normal rate and regular rhythm.      Heart sounds: Normal heart sounds.   Pulmonary:      Effort: Pulmonary effort is normal.      Breath sounds: Normal breath sounds. No wheezing, rhonchi or rales.      Comments: 2L NC   Musculoskeletal:      Right lower leg: No edema.      Left lower leg: No edema.   Skin:     General: Skin is warm and dry.      Capillary Refill: Capillary refill takes less than 2 seconds.   Neurological:      General: No focal deficit present.      Mental Status: She is alert and oriented to person, place, and time.   Psychiatric:         Attention and Perception: Attention normal.         Mood and Affect: Mood normal.         Speech: Speech normal.         Behavior: Behavior is cooperative.           Laboratory:  CBC:  Recent Labs   Lab Result Units 07/16/23  0442 07/17/23  0517 07/18/23  0611   WBC K/uL 9.00 7.85 7.45   RBC M/uL 3.97* 3.88* 3.99*   Hemoglobin g/dL 12.0 11.8* 11.9*   Hematocrit % 36.6* 35.8* 37.2   Platelets K/uL 259 253 263   MCV fL 92 92 93   MCH pg 30.2 30.4 29.8   MCHC g/dL 32.8 33.0 32.0     CMP:  Recent Labs   Lab Result Units 07/15/23  1754 07/16/23  0442 07/18/23  0611   Glucose mg/dL 105   < > 93   Calcium mg/dL 9.5   < > 9.7   Albumin g/dL 4.1  --   --    Total Protein g/dL 7.1  --   --    Sodium mmol/L 139   < > 139   Potassium mmol/L 4.6   < > 4.4   CO2 mmol/L 19*   < > 24   Chloride mmol/L 107   < > 104   BUN mg/dL 30*   < > 26*   Alkaline Phosphatase U/L 83  --   --    ALT U/L 29  --   --    AST U/L 30  --   " "--    Total Bilirubin mg/dL 0.6  --   --     < > = values in this interval not displayed.     URINALYSIS:  Recent Labs   Lab Result Units 07/15/23  2225   Color, UA  Yellow   Specific Gravity, UA  1.010   pH, UA  6.0   Protein, UA  Negative   Bacteria /hpf Moderate*   Nitrite, UA  Positive*   Leukocytes, UA  Negative   Urobilinogen, UA EU/dL Negative      LIPIDS:  Recent Labs   Lab Result Units 07/16/23  0010   TSH uIU/mL 1.960     TSH:  Recent Labs   Lab Result Units 07/16/23  0010   TSH uIU/mL 1.960     A1C:  No results for input(s): "HGBA1C" in the last 2160 hours.    Assessment:         ICD-10-CM ICD-9-CM   1. Acute saddle pulmonary embolism without acute cor pulmonale  I26.92 415.13   2. Vascular dementia without behavioral disturbance  F01.50 290.40   3. Acute respiratory failure with hypoxia  J96.01 518.81   4. Recurrent major depressive disorder, in full remission  F33.42 296.36   5. Generalized anxiety disorder  F41.1 300.02   6. Pulmonary hypertension  I27.20 416.8   7. Paroxysmal atrial fibrillation  I48.0 427.31   8. Stage 3b chronic kidney disease  N18.32 585.3   9. Gastroesophageal reflux disease without esophagitis  K21.9 530.81   10. Chronic pain of both knees  M25.561 719.46    M25.562 338.29    G89.29        Plan:       Acute saddle pulmonary embolism without acute cor pulmonale  CTA chest showed acute bilateral pulmonary emboli. Continue apixaban BID.   Scheduled on 08/08 with Dr. Betancur in Cardiology.     Vascular dementia without behavioral disturbance  Stable with current regimen.     Acute respiratory failure with hypoxia  Currently on O2 at 2L NC. No SOB today.   oxygen continuously, maintain Sats>92 with oxygen. Instructed patient and family if sats consistently <90 on oxygen and sitting/at rest, please notify us,if symptomatic, instructed to please go to ER.     Pulmonary hypertension  Echo showed pulm htn sec to VTE, Continue apixaban BID, keep f/u appointment with Cardiology. "     Recurrent major depressive disorder, in full remission  Generalized anxiety disorder  Continue setraline daily.     Paroxysmal atrial fibrillation  Currently in SR. Followed by Cardiology.     Stage 3b chronic kidney disease  GRF 41.1, Stable. Continue to avoid nephrotoxic drugs, renally dose medications.     Gastroesophageal reflux disease without esophagitis  Controlled. Continue omeprazole as needed.     Chronic pain of both knees  Chronic. Stable. Continue to use assistive devices as needed.     If symptoms worsen, go to ER.  If symptoms do not improve, return to clinic.   Keep appointments with all specialists.     Patient and family verbalizes understanding and agrees with current treatment plan.      Follow up in about 1 month (around 8/28/2023).     Patient's Medications   New Prescriptions    No medications on file   Previous Medications    ALPRAZOLAM (XANAX) 0.5 MG TABLET    TAKE 1 TABLET BY MOUTH AT BEDTIME AS NEEDED FOR INSOMNIA OR ANXIETY.    APIXABAN (ELIQUIS) 5 MG TAB    Take 2 tablets (10 mg total) by mouth 2 (two) times daily for 5 days, THEN 1 tablet (5 mg total) 2 (two) times daily.    MEMANTINE (NAMENDA) 5 MG TAB    Take 1 tablet (5 mg total) by mouth once daily.    METOPROLOL SUCCINATE (TOPROL-XL) 25 MG 24 HR TABLET    Take 1 tablet (25 mg total) by mouth once daily.    OMEPRAZOLE (PRILOSEC) 40 MG CAPSULE    Take 1 capsule (40 mg total) by mouth once daily.    ROSUVASTATIN (CRESTOR) 10 MG TABLET    Take 1 tablet (10 mg total) by mouth once daily.    SERTRALINE (ZOLOFT) 50 MG TABLET    Take 1 tablet (50 mg total) by mouth once daily.   Modified Medications    No medications on file   Discontinued Medications    DICLOFENAC SODIUM (VOLTAREN) 1 % GEL    Apply 2 g topically once daily.         Lauren Caballero NP

## 2023-07-28 ENCOUNTER — DOCUMENT SCAN (OUTPATIENT)
Dept: HOME HEALTH SERVICES | Facility: HOSPITAL | Age: 88
End: 2023-07-28
Payer: MEDICARE

## 2023-07-28 ENCOUNTER — OFFICE VISIT (OUTPATIENT)
Dept: FAMILY MEDICINE | Facility: CLINIC | Age: 88
End: 2023-07-28
Payer: MEDICARE

## 2023-07-28 VITALS
WEIGHT: 151.5 LBS | HEIGHT: 63 IN | DIASTOLIC BLOOD PRESSURE: 68 MMHG | OXYGEN SATURATION: 97 % | SYSTOLIC BLOOD PRESSURE: 126 MMHG | BODY MASS INDEX: 26.84 KG/M2 | HEART RATE: 65 BPM

## 2023-07-28 DIAGNOSIS — F33.42 RECURRENT MAJOR DEPRESSIVE DISORDER, IN FULL REMISSION: Chronic | ICD-10-CM

## 2023-07-28 DIAGNOSIS — F01.50 VASCULAR DEMENTIA WITHOUT BEHAVIORAL DISTURBANCE: Chronic | ICD-10-CM

## 2023-07-28 DIAGNOSIS — J96.01 ACUTE RESPIRATORY FAILURE WITH HYPOXIA: ICD-10-CM

## 2023-07-28 DIAGNOSIS — I27.20 PULMONARY HYPERTENSION: ICD-10-CM

## 2023-07-28 DIAGNOSIS — K21.9 GASTROESOPHAGEAL REFLUX DISEASE WITHOUT ESOPHAGITIS: Chronic | ICD-10-CM

## 2023-07-28 DIAGNOSIS — N18.32 STAGE 3B CHRONIC KIDNEY DISEASE: Chronic | ICD-10-CM

## 2023-07-28 DIAGNOSIS — M25.562 CHRONIC PAIN OF BOTH KNEES: ICD-10-CM

## 2023-07-28 DIAGNOSIS — I26.92 ACUTE SADDLE PULMONARY EMBOLISM WITHOUT ACUTE COR PULMONALE: Primary | ICD-10-CM

## 2023-07-28 DIAGNOSIS — F41.1 GENERALIZED ANXIETY DISORDER: Chronic | ICD-10-CM

## 2023-07-28 DIAGNOSIS — M25.561 CHRONIC PAIN OF BOTH KNEES: ICD-10-CM

## 2023-07-28 DIAGNOSIS — I48.0 PAROXYSMAL ATRIAL FIBRILLATION: Chronic | ICD-10-CM

## 2023-07-28 DIAGNOSIS — G89.29 CHRONIC PAIN OF BOTH KNEES: ICD-10-CM

## 2023-07-28 PROCEDURE — 3288F PR FALLS RISK ASSESSMENT DOCUMENTED: ICD-10-PCS | Mod: HCNC,CPTII,S$GLB,

## 2023-07-28 PROCEDURE — 1125F AMNT PAIN NOTED PAIN PRSNT: CPT | Mod: HCNC,CPTII,S$GLB,

## 2023-07-28 PROCEDURE — 3074F PR MOST RECENT SYSTOLIC BLOOD PRESSURE < 130 MM HG: ICD-10-PCS | Mod: HCNC,CPTII,S$GLB,

## 2023-07-28 PROCEDURE — 1125F PR PAIN SEVERITY QUANTIFIED, PAIN PRESENT: ICD-10-PCS | Mod: HCNC,CPTII,S$GLB,

## 2023-07-28 PROCEDURE — 3074F SYST BP LT 130 MM HG: CPT | Mod: HCNC,CPTII,S$GLB,

## 2023-07-28 PROCEDURE — 1101F PT FALLS ASSESS-DOCD LE1/YR: CPT | Mod: HCNC,CPTII,S$GLB,

## 2023-07-28 PROCEDURE — 99495 TCM SERVICES (MODERATE COMPLEXITY): ICD-10-PCS | Mod: HCNC,S$GLB,,

## 2023-07-28 PROCEDURE — 1160F PR REVIEW ALL MEDS BY PRESCRIBER/CLIN PHARMACIST DOCUMENTED: ICD-10-PCS | Mod: HCNC,CPTII,S$GLB,

## 2023-07-28 PROCEDURE — 1101F PR PT FALLS ASSESS DOC 0-1 FALLS W/OUT INJ PAST YR: ICD-10-PCS | Mod: HCNC,CPTII,S$GLB,

## 2023-07-28 PROCEDURE — 3078F DIAST BP <80 MM HG: CPT | Mod: HCNC,CPTII,S$GLB,

## 2023-07-28 PROCEDURE — 1159F MED LIST DOCD IN RCRD: CPT | Mod: HCNC,CPTII,S$GLB,

## 2023-07-28 PROCEDURE — 1160F RVW MEDS BY RX/DR IN RCRD: CPT | Mod: HCNC,CPTII,S$GLB,

## 2023-07-28 PROCEDURE — 99999 PR PBB SHADOW E&M-EST. PATIENT-LVL IV: CPT | Mod: PBBFAC,HCNC,,

## 2023-07-28 PROCEDURE — 99999 PR PBB SHADOW E&M-EST. PATIENT-LVL IV: ICD-10-PCS | Mod: PBBFAC,HCNC,,

## 2023-07-28 PROCEDURE — 1111F PR DISCHARGE MEDS RECONCILED W/ CURRENT OUTPATIENT MED LIST: ICD-10-PCS | Mod: HCNC,CPTII,S$GLB,

## 2023-07-28 PROCEDURE — 3078F PR MOST RECENT DIASTOLIC BLOOD PRESSURE < 80 MM HG: ICD-10-PCS | Mod: HCNC,CPTII,S$GLB,

## 2023-07-28 PROCEDURE — 1159F PR MEDICATION LIST DOCUMENTED IN MEDICAL RECORD: ICD-10-PCS | Mod: HCNC,CPTII,S$GLB,

## 2023-07-28 PROCEDURE — 99495 TRANSJ CARE MGMT MOD F2F 14D: CPT | Mod: HCNC,S$GLB,,

## 2023-07-28 PROCEDURE — 1111F DSCHRG MED/CURRENT MED MERGE: CPT | Mod: HCNC,CPTII,S$GLB,

## 2023-07-28 PROCEDURE — 3288F FALL RISK ASSESSMENT DOCD: CPT | Mod: HCNC,CPTII,S$GLB,

## 2023-07-28 NOTE — PATIENT INSTRUCTIONS
Use oxygen continuously, maintain Sats>92 with oxygen. If sats consistently <90 on oxygen and sitting, please notify us, if you are having symptoms, please go to ER.     Continue medications as prescribed.   Keep follow up appointment with Dr. Betancur    Follow up in 1 month or sooner if needed.   Notify us if any issues with home health.

## 2023-07-31 ENCOUNTER — DOCUMENT SCAN (OUTPATIENT)
Dept: HOME HEALTH SERVICES | Facility: HOSPITAL | Age: 88
End: 2023-07-31
Payer: MEDICARE

## 2023-08-08 ENCOUNTER — OFFICE VISIT (OUTPATIENT)
Dept: CARDIOLOGY | Facility: CLINIC | Age: 88
End: 2023-08-08
Payer: MEDICARE

## 2023-08-08 VITALS
DIASTOLIC BLOOD PRESSURE: 63 MMHG | BODY MASS INDEX: 27.24 KG/M2 | WEIGHT: 153.81 LBS | OXYGEN SATURATION: 94 % | HEART RATE: 63 BPM | SYSTOLIC BLOOD PRESSURE: 104 MMHG

## 2023-08-08 DIAGNOSIS — E78.00 HYPERCHOLESTEROLEMIA: Chronic | ICD-10-CM

## 2023-08-08 DIAGNOSIS — I26.92 ACUTE SADDLE PULMONARY EMBOLISM WITHOUT ACUTE COR PULMONALE: ICD-10-CM

## 2023-08-08 DIAGNOSIS — I48.0 PAROXYSMAL ATRIAL FIBRILLATION: Chronic | ICD-10-CM

## 2023-08-08 DIAGNOSIS — I27.20 PULMONARY HYPERTENSION: ICD-10-CM

## 2023-08-08 DIAGNOSIS — N18.32 STAGE 3B CHRONIC KIDNEY DISEASE: Chronic | ICD-10-CM

## 2023-08-08 PROCEDURE — 3078F PR MOST RECENT DIASTOLIC BLOOD PRESSURE < 80 MM HG: ICD-10-PCS | Mod: HCNC,CPTII,S$GLB, | Performed by: INTERNAL MEDICINE

## 2023-08-08 PROCEDURE — 1111F DSCHRG MED/CURRENT MED MERGE: CPT | Mod: HCNC,CPTII,S$GLB, | Performed by: INTERNAL MEDICINE

## 2023-08-08 PROCEDURE — 3074F SYST BP LT 130 MM HG: CPT | Mod: HCNC,CPTII,S$GLB, | Performed by: INTERNAL MEDICINE

## 2023-08-08 PROCEDURE — 1125F PR PAIN SEVERITY QUANTIFIED, PAIN PRESENT: ICD-10-PCS | Mod: HCNC,CPTII,S$GLB, | Performed by: INTERNAL MEDICINE

## 2023-08-08 PROCEDURE — 99999 PR PBB SHADOW E&M-EST. PATIENT-LVL III: CPT | Mod: PBBFAC,HCNC,, | Performed by: INTERNAL MEDICINE

## 2023-08-08 PROCEDURE — 1160F RVW MEDS BY RX/DR IN RCRD: CPT | Mod: HCNC,CPTII,S$GLB, | Performed by: INTERNAL MEDICINE

## 2023-08-08 PROCEDURE — 99214 PR OFFICE/OUTPT VISIT, EST, LEVL IV, 30-39 MIN: ICD-10-PCS | Mod: HCNC,S$GLB,, | Performed by: INTERNAL MEDICINE

## 2023-08-08 PROCEDURE — 1159F MED LIST DOCD IN RCRD: CPT | Mod: HCNC,CPTII,S$GLB, | Performed by: INTERNAL MEDICINE

## 2023-08-08 PROCEDURE — 1160F PR REVIEW ALL MEDS BY PRESCRIBER/CLIN PHARMACIST DOCUMENTED: ICD-10-PCS | Mod: HCNC,CPTII,S$GLB, | Performed by: INTERNAL MEDICINE

## 2023-08-08 PROCEDURE — 3074F PR MOST RECENT SYSTOLIC BLOOD PRESSURE < 130 MM HG: ICD-10-PCS | Mod: HCNC,CPTII,S$GLB, | Performed by: INTERNAL MEDICINE

## 2023-08-08 PROCEDURE — 3078F DIAST BP <80 MM HG: CPT | Mod: HCNC,CPTII,S$GLB, | Performed by: INTERNAL MEDICINE

## 2023-08-08 PROCEDURE — 1111F PR DISCHARGE MEDS RECONCILED W/ CURRENT OUTPATIENT MED LIST: ICD-10-PCS | Mod: HCNC,CPTII,S$GLB, | Performed by: INTERNAL MEDICINE

## 2023-08-08 PROCEDURE — 99214 OFFICE O/P EST MOD 30 MIN: CPT | Mod: HCNC,S$GLB,, | Performed by: INTERNAL MEDICINE

## 2023-08-08 PROCEDURE — 1159F PR MEDICATION LIST DOCUMENTED IN MEDICAL RECORD: ICD-10-PCS | Mod: HCNC,CPTII,S$GLB, | Performed by: INTERNAL MEDICINE

## 2023-08-08 PROCEDURE — 99999 PR PBB SHADOW E&M-EST. PATIENT-LVL III: ICD-10-PCS | Mod: PBBFAC,HCNC,, | Performed by: INTERNAL MEDICINE

## 2023-08-08 PROCEDURE — 1125F AMNT PAIN NOTED PAIN PRSNT: CPT | Mod: HCNC,CPTII,S$GLB, | Performed by: INTERNAL MEDICINE

## 2023-08-08 NOTE — PROGRESS NOTES
[facility] Cardiology     Subjective:    Patient ID:  Alessandra Hidalgo is a 87 y.o. female who presents for follow-up of Atrial Fibrillation, pulmonary embolus, Hypertension, Hyperlipidemia, and Shortness of Breath    Review of patient's allergies indicates:   Allergen Reactions    Atorvastatin     Prozac [fluoxetine]      dizziness      She was taken to the emergency room for shortness of breath and found to have bilateral large pulmonary emboli.  Her echo showed RV enlargement with preserved LV function.  There was severe pulmonary hypertension in the 130 mm Hg range.  She had an Electrocardiogram confirming sinus rhythm.  She has a history of paroxysmal AFib.  I saw her in 2021 and decision was made at that time to withdrawal anticoagulation.  She had had a pulmonary embolus in 2014 after knee replacement.    She was recently discharged from the hospital with home oxygen.  She has recovered well but still has home oxygen usage.  Her sats dropped to 88 with ambulation off oxygen.  Her son is wondering when she can get rid of oxygen.  She is on Eliquis.  Which she is not having bleeding problems.  She is in sinus rhythm by physical exam today.  She is very hard of hearing.  She remains on rosuvastatin for hyperlipidemia.        Review of Systems   Constitutional: Negative for chills, decreased appetite, diaphoresis, fever, malaise/fatigue, night sweats, weight gain and weight loss.   HENT:  Positive for hearing loss. Negative for congestion, ear discharge, ear pain, hoarse voice, nosebleeds, odynophagia, sore throat, stridor and tinnitus.    Eyes:  Negative for blurred vision, discharge, double vision, pain, photophobia, redness, vision loss in left eye, vision loss in right eye, visual disturbance and visual halos.   Cardiovascular:  Positive for dyspnea on exertion. Negative for chest pain, claudication, cyanosis, irregular heartbeat, leg  swelling, near-syncope, orthopnea, palpitations, paroxysmal nocturnal dyspnea and syncope.   Respiratory:  Positive for shortness of breath. Negative for cough, hemoptysis, sleep disturbances due to breathing, snoring, sputum production and wheezing.    Endocrine: Negative for cold intolerance, heat intolerance, polydipsia, polyphagia and polyuria.   Hematologic/Lymphatic: Negative for adenopathy and bleeding problem. Does not bruise/bleed easily.   Skin:  Negative for color change, dry skin, flushing, itching, nail changes, poor wound healing, rash, skin cancer, suspicious lesions and unusual hair distribution.   Musculoskeletal:  Negative for arthritis, back pain, falls, gout, joint pain, joint swelling, muscle cramps, muscle weakness, myalgias, neck pain and stiffness.   Gastrointestinal:  Negative for bloating, abdominal pain, anorexia, change in bowel habit, bowel incontinence, constipation, diarrhea, dysphagia, excessive appetite, flatus, heartburn, hematemesis, hematochezia, hemorrhoids, jaundice, melena, nausea and vomiting.   Genitourinary:  Negative for bladder incontinence, decreased libido, dysuria, flank pain, frequency, genital sores, hematuria, hesitancy, incomplete emptying, nocturia and urgency.   Neurological:  Positive for weakness. Negative for aphonia, brief paralysis, difficulty with concentration, disturbances in coordination, excessive daytime sleepiness, dizziness, focal weakness, headaches, light-headedness, loss of balance, numbness, paresthesias, seizures, sensory change, tremors and vertigo.   Psychiatric/Behavioral:  Negative for altered mental status, depression, hallucinations, memory loss, substance abuse, suicidal ideas and thoughts of violence. The patient does not have insomnia and is not nervous/anxious.    Allergic/Immunologic: Negative for hives and persistent infections.        Objective:       Vitals:    08/08/23 1032   BP: 104/63   Pulse: 63   SpO2: (!) 94%   Weight: 69.8 kg  (153 lb 12.8 oz)    Physical Exam  Constitutional:       General: She is not in acute distress.     Appearance: She is well-developed. She is not diaphoretic.   HENT:      Head: Normocephalic and atraumatic.      Nose: Nose normal.   Eyes:      General: No scleral icterus.        Right eye: No discharge.      Conjunctiva/sclera: Conjunctivae normal.      Pupils: Pupils are equal, round, and reactive to light.   Neck:      Thyroid: No thyromegaly.      Vascular: No JVD.      Trachea: No tracheal deviation.   Cardiovascular:      Rate and Rhythm: Normal rate and regular rhythm.      Pulses:           Dorsalis pedis pulses are 2+ on the right side and 2+ on the left side.        Posterior tibial pulses are 2+ on the right side and 2+ on the left side.      Heart sounds: Normal heart sounds. No murmur heard.     No friction rub. No gallop.   Pulmonary:      Effort: Pulmonary effort is normal. No respiratory distress.      Breath sounds: Normal breath sounds. No stridor. No wheezing or rales.   Chest:      Chest wall: No tenderness.   Abdominal:      General: Bowel sounds are normal. There is no distension.      Palpations: Abdomen is soft. There is no mass.      Tenderness: There is no abdominal tenderness. There is no guarding or rebound.   Musculoskeletal:         General: No tenderness. Normal range of motion.      Cervical back: Normal range of motion and neck supple.   Lymphadenopathy:      Cervical: No cervical adenopathy.   Skin:     General: Skin is warm and dry.      Coloration: Skin is not pale.      Findings: No erythema or rash.   Neurological:      Mental Status: She is alert and oriented to person, place, and time.      Cranial Nerves: No cranial nerve deficit.      Coordination: Coordination normal.   Psychiatric:         Behavior: Behavior normal.         Thought Content: Thought content normal.         Judgment: Judgment normal.           Assessment:       1. Acute saddle pulmonary embolism without  acute cor pulmonale    2. Stage 3b chronic kidney disease    3. Paroxysmal atrial fibrillation    4. Hypercholesterolemia    5. Pulmonary hypertension      Results for orders placed or performed during the hospital encounter of 07/15/23   Influenza A & B by Molecular    Specimen: Nasopharyngeal Swab   Result Value Ref Range    Influenza A, Molecular Negative Negative    Influenza B, Molecular Negative Negative    Flu A & B Source Nasal swab    Urine culture    Specimen: Urine   Result Value Ref Range    Urine Culture, Routine ESCHERICHIA COLI  >100,000 cfu/ml   (A)        Susceptibility    Escherichia coli - CULTURE, URINE     Amp/Sulbactam 16/8 Intermediate mcg/mL     Ampicillin >16 Resistant mcg/mL     Amox/K Clav'ate <=8/4 Sensitive mcg/mL     Ceftriaxone <=1 Sensitive mcg/mL     Cefazolin <=2 Sensitive mcg/mL     Ciprofloxacin <=1 Sensitive mcg/mL     Cefepime <=2 Sensitive mcg/mL     Ertapenem <=0.5 Sensitive mcg/mL     Nitrofurantoin <=32 Sensitive mcg/mL     Gentamicin <=4 Sensitive mcg/mL     Levofloxacin <=2 Sensitive mcg/mL     Meropenem <=1 Sensitive mcg/mL     Piperacillin/Tazo <=16 Sensitive mcg/mL     Trimeth/Sulfa <=2/38 Sensitive mcg/mL     Tobramycin <=4 Sensitive mcg/mL   CBC auto differential   Result Value Ref Range    WBC 8.10 3.90 - 12.70 K/uL    RBC 4.10 4.00 - 5.40 M/uL    Hemoglobin 12.4 12.0 - 16.0 g/dL    Hematocrit 37.6 37.0 - 48.5 %    MCV 92 82 - 98 fL    MCH 30.2 27.0 - 31.0 pg    MCHC 33.0 32.0 - 36.0 g/dL    RDW 15.5 (H) 11.5 - 14.5 %    Platelets 259 150 - 450 K/uL    MPV 11.5 9.2 - 12.9 fL    Immature Granulocytes 0.5 0.0 - 0.5 %    Gran # (ANC) 5.5 1.8 - 7.7 K/uL    Immature Grans (Abs) 0.04 0.00 - 0.04 K/uL    Lymph # 1.9 1.0 - 4.8 K/uL    Mono # 0.6 0.3 - 1.0 K/uL    Eos # 0.1 0.0 - 0.5 K/uL    Baso # 0.05 0.00 - 0.20 K/uL    nRBC 0 0 /100 WBC    Gran % 67.5 38.0 - 73.0 %    Lymph % 22.8 18.0 - 48.0 %    Mono % 7.9 4.0 - 15.0 %    Eosinophil % 0.7 0.0 - 8.0 %    Basophil % 0.6  0.0 - 1.9 %    Differential Method Automated    Comprehensive metabolic panel   Result Value Ref Range    Sodium 139 136 - 145 mmol/L    Potassium 4.6 3.5 - 5.1 mmol/L    Chloride 107 95 - 110 mmol/L    CO2 19 (L) 23 - 29 mmol/L    Glucose 105 70 - 110 mg/dL    BUN 30 (H) 7 - 17 mg/dL    Creatinine 1.29 0.50 - 1.40 mg/dL    Calcium 9.5 8.7 - 10.5 mg/dL    Total Protein 7.1 6.0 - 8.4 g/dL    Albumin 4.1 3.5 - 5.2 g/dL    Total Bilirubin 0.6 0.1 - 1.0 mg/dL    Alkaline Phosphatase 83 38 - 126 U/L    AST 30 15 - 46 U/L    ALT 29 10 - 44 U/L    Anion Gap 13 8 - 16 mmol/L    eGFR 40.7 (A) >60 mL/min/1.73 m^2   Troponin I #1   Result Value Ref Range    Troponin I 0.012 0.012 - 0.034 ng/mL   NT-Pro Natriuretic Peptide   Result Value Ref Range    NT-proBNP 6850 (H) 5 - 1800 pg/mL   Urinalysis, Reflex to Urine Culture Urine, Clean Catch    Specimen: Urine   Result Value Ref Range    Specimen UA Urine, Clean Catch     Color, UA Yellow Yellow, Straw, Keisha    Appearance, UA Clear Clear    pH, UA 6.0 5.0 - 8.0    Specific Gravity, UA 1.010 1.005 - 1.030    Protein, UA Negative Negative    Glucose, UA Negative Negative    Ketones, UA Negative Negative    Bilirubin (UA) Negative Negative    Occult Blood UA Negative Negative    Nitrite, UA Positive (A) Negative    Urobilinogen, UA Negative <2.0 EU/dL    Leukocytes, UA Negative Negative   COVID-19 Rapid Screening   Result Value Ref Range    SARS-CoV-2 RNA, Amplification, Qual Negative Negative   D dimer, quantitative   Result Value Ref Range    D-Dimer 8.01 (H) <0.50 mg/L FEU   Troponin I   Result Value Ref Range    Troponin I 0.017 0.012 - 0.034 ng/mL   TSH   Result Value Ref Range    TSH 1.960 0.400 - 4.000 uIU/mL   Urinalysis Microscopic   Result Value Ref Range    RBC, UA 1 0 - 4 /hpf    WBC, UA 1 0 - 5 /hpf    Bacteria Moderate (A) None-Occ /hpf    Microscopic Comment SEE COMMENT    Troponin I   Result Value Ref Range    Troponin I 0.023 0.012 - 0.034 ng/mL   Basic Metabolic  Panel (BMP)   Result Value Ref Range    Sodium 138 136 - 145 mmol/L    Potassium 4.2 3.5 - 5.1 mmol/L    Chloride 105 95 - 110 mmol/L    CO2 21 (L) 23 - 29 mmol/L    Glucose 95 70 - 110 mg/dL    BUN 28 (H) 7 - 17 mg/dL    Creatinine 1.31 0.50 - 1.40 mg/dL    Calcium 9.4 8.7 - 10.5 mg/dL    Anion Gap 12 8 - 16 mmol/L    eGFR 39.9 (A) >60 mL/min/1.73 m^2   Magnesium   Result Value Ref Range    Magnesium 2.0 1.6 - 2.6 mg/dL   Phosphorus   Result Value Ref Range    Phosphorus 4.1 2.7 - 4.5 mg/dL   CBC with Automated Differential   Result Value Ref Range    WBC 9.00 3.90 - 12.70 K/uL    RBC 3.97 (L) 4.00 - 5.40 M/uL    Hemoglobin 12.0 12.0 - 16.0 g/dL    Hematocrit 36.6 (L) 37.0 - 48.5 %    MCV 92 82 - 98 fL    MCH 30.2 27.0 - 31.0 pg    MCHC 32.8 32.0 - 36.0 g/dL    RDW 15.3 (H) 11.5 - 14.5 %    Platelets 259 150 - 450 K/uL    MPV 12.0 9.2 - 12.9 fL    Immature Granulocytes 0.4 0.0 - 0.5 %    Gran # (ANC) 6.3 1.8 - 7.7 K/uL    Immature Grans (Abs) 0.04 0.00 - 0.04 K/uL    Lymph # 1.8 1.0 - 4.8 K/uL    Mono # 0.8 0.3 - 1.0 K/uL    Eos # 0.1 0.0 - 0.5 K/uL    Baso # 0.06 0.00 - 0.20 K/uL    nRBC 0 0 /100 WBC    Gran % 70.3 38.0 - 73.0 %    Lymph % 19.6 18.0 - 48.0 %    Mono % 8.4 4.0 - 15.0 %    Eosinophil % 0.6 0.0 - 8.0 %    Basophil % 0.7 0.0 - 1.9 %    Differential Method Automated    Basic Metabolic Panel (BMP)   Result Value Ref Range    Sodium 139 136 - 145 mmol/L    Potassium 4.1 3.5 - 5.1 mmol/L    Chloride 105 95 - 110 mmol/L    CO2 22 (L) 23 - 29 mmol/L    Glucose 92 70 - 110 mg/dL    BUN 27 (H) 7 - 17 mg/dL    Creatinine 1.22 0.50 - 1.40 mg/dL    Calcium 9.2 8.7 - 10.5 mg/dL    Anion Gap 12 8 - 16 mmol/L    eGFR 43.5 (A) >60 mL/min/1.73 m^2   Magnesium   Result Value Ref Range    Magnesium 2.1 1.6 - 2.6 mg/dL   CBC with Automated Differential   Result Value Ref Range    WBC 7.85 3.90 - 12.70 K/uL    RBC 3.88 (L) 4.00 - 5.40 M/uL    Hemoglobin 11.8 (L) 12.0 - 16.0 g/dL    Hematocrit 35.8 (L) 37.0 - 48.5 %     MCV 92 82 - 98 fL    MCH 30.4 27.0 - 31.0 pg    MCHC 33.0 32.0 - 36.0 g/dL    RDW 15.3 (H) 11.5 - 14.5 %    Platelets 253 150 - 450 K/uL    MPV 11.6 9.2 - 12.9 fL    Immature Granulocytes 0.3 0.0 - 0.5 %    Gran # (ANC) 4.7 1.8 - 7.7 K/uL    Immature Grans (Abs) 0.02 0.00 - 0.04 K/uL    Lymph # 2.2 1.0 - 4.8 K/uL    Mono # 0.8 0.3 - 1.0 K/uL    Eos # 0.1 0.0 - 0.5 K/uL    Baso # 0.06 0.00 - 0.20 K/uL    nRBC 0 0 /100 WBC    Gran % 60.0 38.0 - 73.0 %    Lymph % 27.5 18.0 - 48.0 %    Mono % 10.1 4.0 - 15.0 %    Eosinophil % 1.3 0.0 - 8.0 %    Basophil % 0.8 0.0 - 1.9 %    Differential Method Automated    Basic Metabolic Panel (BMP)   Result Value Ref Range    Sodium 139 136 - 145 mmol/L    Potassium 4.4 3.5 - 5.1 mmol/L    Chloride 104 95 - 110 mmol/L    CO2 24 23 - 29 mmol/L    Glucose 93 70 - 110 mg/dL    BUN 26 (H) 7 - 17 mg/dL    Creatinine 1.28 0.50 - 1.40 mg/dL    Calcium 9.7 8.7 - 10.5 mg/dL    Anion Gap 11 8 - 16 mmol/L    eGFR 41.1 (A) >60 mL/min/1.73 m^2   CBC with Automated Differential   Result Value Ref Range    WBC 7.45 3.90 - 12.70 K/uL    RBC 3.99 (L) 4.00 - 5.40 M/uL    Hemoglobin 11.9 (L) 12.0 - 16.0 g/dL    Hematocrit 37.2 37.0 - 48.5 %    MCV 93 82 - 98 fL    MCH 29.8 27.0 - 31.0 pg    MCHC 32.0 32.0 - 36.0 g/dL    RDW 15.4 (H) 11.5 - 14.5 %    Platelets 263 150 - 450 K/uL    MPV 11.4 9.2 - 12.9 fL    Immature Granulocytes 0.4 0.0 - 0.5 %    Gran # (ANC) 4.3 1.8 - 7.7 K/uL    Immature Grans (Abs) 0.03 0.00 - 0.04 K/uL    Lymph # 2.2 1.0 - 4.8 K/uL    Mono # 0.7 0.3 - 1.0 K/uL    Eos # 0.1 0.0 - 0.5 K/uL    Baso # 0.06 0.00 - 0.20 K/uL    nRBC 0 0 /100 WBC    Gran % 58.0 38.0 - 73.0 %    Lymph % 29.5 18.0 - 48.0 %    Mono % 9.4 4.0 - 15.0 %    Eosinophil % 1.9 0.0 - 8.0 %    Basophil % 0.8 0.0 - 1.9 %    Differential Method Automated    Echo Saline Bubble? No   Result Value Ref Range    BSA 1.77 m2    LA WIDTH 2.20 cm    LA volume (mod) 21.41 cm3    TDI LATERAL 0.08 m/s    Left Ventricular Outflow  Tract Mean Gradient 2.00 mmHg    Left Ventricular Outflow Tract Mean Velocity 0.65 cm/s    MV peak gradient 4 mmHg    MV mean gradient 1 mmHg    Mr max otto 4.92 m/s    MV VTI 20.9 cm    RA area 16.7 cm2    TR Max Otto 5.49 m/s    Ao root annulus 2.65 cm    Ao peak otto 1.84 m/s    Posterior Wall 1.11 (A) 0.6 - 1.1 cm    LVOT diameter 1.90 cm    LVOT peak otto 1.08 m/s    LVOT peak VTI 24.00 cm    E wave deceleration time 289.70 msec    MV Peak A Otto 0.69 m/s    MV Peak E Otto 0.24 m/s    RA Major Axis 4.36 cm    RA Width 3.79 cm    TAPSE 1.75 cm    IVS 1.07 0.6 - 1.1 cm    Ao VTI 38.5 cm    AV mean gradient 7 mmHg    LVIDd 3.20 (A) 3.5 - 6.0 cm    LVIDs 2.41 2.1 - 4.0 cm    Left Atrium Major Axis 4.39 cm    Left Atrium Minor Axis 2.44 cm    Right Atrium Volume Systolic 48.77 mL    RV mid diameter 1.71 cm    PV PEAK VELOCITY 0.71 cm/s    Pulmonary Valve Mean Velocity 0.49 m/s    LV Systolic Volume 11.20 mL    LV Diastolic Volume 26.94 mL    TDI SEPTAL 0.07 m/s    RVDD 2.43 cm    AORTIC VALVE CUSP SEPERATION 1.24 cm    LA size 2.98 cm    Right ventricular length in diastole (apical 4-chamber view) 51.4 cm    RV S' 11.94 cm/s    IVC diameter 1.96 cm    Leavitt's Biplane MOD Ejection Fraction 6 %    LV LATERAL E/E' RATIO 3.00 m/s    LV SEPTAL E/E' RATIO 3.43 m/s    FS 25 %    LA volume 17.48 cm3    LV mass 102.90 g    Left Ventricle Relative Wall Thickness 0.69 cm    AV valve area 1.77 cm2    AV Velocity Ratio 0.59     AV index (prosthetic) 0.62     MV valve area by continuity eq 3.25 cm2    E/A ratio 0.35     Mean e' 0.08 m/s    LVOT area 2.8 cm2    LVOT stroke volume 68.01 cm3    AV peak gradient 14 mmHg    E/E' ratio 3.20 m/s    LV Systolic Volume Index 6.4 mL/m2    LV Diastolic Volume Index 15.48 mL/m2    LA Volume Index 10.0 mL/m2    LV Mass Index 59 g/m2    Triscuspid Valve Regurgitation Peak Gradient 121 mmHg    LA Volume Index (Mod) 12.3 mL/m2    Right Atrial Pressure (from IVC) 8 mmHg    EF 60 %    TV resting  pulmonary artery pressure 129 mmHg         Current Outpatient Medications:     ALPRAZolam (XANAX) 0.5 MG tablet, TAKE 1 TABLET BY MOUTH AT BEDTIME AS NEEDED FOR INSOMNIA OR ANXIETY., Disp: 30 tablet, Rfl: 5    apixaban (ELIQUIS) 5 mg Tab, Take 2 tablets (10 mg total) by mouth 2 (two) times daily for 5 days, THEN 1 tablet (5 mg total) 2 (two) times daily., Disp: 140 tablet, Rfl: 0    memantine (NAMENDA) 5 MG Tab, Take 1 tablet (5 mg total) by mouth once daily., Disp: 90 tablet, Rfl: 3    metoprolol succinate (TOPROL-XL) 25 MG 24 hr tablet, Take 1 tablet (25 mg total) by mouth once daily., Disp: 90 tablet, Rfl: 3    omeprazole (PRILOSEC) 40 MG capsule, Take 1 capsule (40 mg total) by mouth once daily. (Patient not taking: Reported on 7/28/2023), Disp: 90 capsule, Rfl: 3    rosuvastatin (CRESTOR) 10 MG tablet, Take 1 tablet (10 mg total) by mouth once daily., Disp: 90 tablet, Rfl: 3    sertraline (ZOLOFT) 50 MG tablet, Take 1 tablet (50 mg total) by mouth once daily., Disp: 90 tablet, Rfl: 3     Lab Results   Component Value Date    WBC 7.45 07/18/2023    RBC 3.99 (L) 07/18/2023    HGB 11.9 (L) 07/18/2023    HCT 37.2 07/18/2023    MCV 93 07/18/2023    MCH 29.8 07/18/2023    MCHC 32.0 07/18/2023    RDW 15.4 (H) 07/18/2023     07/18/2023    MPV 11.4 07/18/2023    GRAN 4.3 07/18/2023    GRAN 58.0 07/18/2023    LYMPH 2.2 07/18/2023    LYMPH 29.5 07/18/2023    MONO 0.7 07/18/2023    MONO 9.4 07/18/2023    EOS 0.1 07/18/2023    BASO 0.06 07/18/2023    EOSINOPHIL 1.9 07/18/2023    BASOPHIL 0.8 07/18/2023    MG 2.1 07/17/2023        CMP  Lab Results   Component Value Date     07/18/2023    K 4.4 07/18/2023     07/18/2023    CO2 24 07/18/2023    GLU 93 07/18/2023    BUN 26 (H) 07/18/2023    CREATININE 1.28 07/18/2023    CALCIUM 9.7 07/18/2023    PROT 7.1 07/15/2023    ALBUMIN 4.1 07/15/2023    BILITOT 0.6 07/15/2023    ALKPHOS 83 07/15/2023    AST 30 07/15/2023    ALT 29 07/15/2023    ANIONGAP 11 07/18/2023     ESTGFRAFRICA 45.2 (A) 07/08/2022    EGFRNONAA 39.2 (A) 07/08/2022        Lab Results   Component Value Date    LABURIN ESCHERICHIA COLI  >100,000 cfu/ml   (A) 07/15/2023            Results for orders placed or performed in visit on 07/07/21   EKG 12-lead    Collection Time: 07/07/21 10:22 AM    Narrative    Test Reason : I48.0,    Vent. Rate : 059 BPM     Atrial Rate : 059 BPM     P-R Int : 128 ms          QRS Dur : 102 ms      QT Int : 452 ms       P-R-T Axes : 058 073 028 degrees     QTc Int : 447 ms    Sinus bradycardia  Incomplete right bundle branch block  Borderline Abnormal ECG  No previous ECGs available  Confirmed by Deejay DARNELL MD, Cain BRIGGS ([id number]) on 7/8/2021 8:48:00 AM    Referred By:             Confirmed By:Cain Villalba III, MD                  Plan:       Problem List Items Addressed This Visit          Cardiac/Vascular    Hypercholesterolemia (Chronic)     Rosuvastatin 10 mg to continue.  Current  mg% by labs July 2022.  It is well tolerated.         Paroxysmal atrial fibrillation (Chronic)     She is now on Eliquis.  There was no AFib by Electrocardiogram on recent hospitalization.  Her rhythm is regular today.  Condition stable.         Pulmonary hypertension     Acute pulmonary hypertension confirmed on July 2023 echo related to acute pulmonary embolus.  I suspect her PA pressures will improve.  The echo showed normal ejection fraction with LVH.            Renal/    Stage 3b chronic kidney disease (Chronic)     Condition stable.  GFR 41, serum creatinine 1.28 mg%.            Hematology    Acute saddle pulmonary embolism without acute cor pulmonale     July 2023 event.  Extensive thrombus noted.  She had hypoxemia and severe pulmonary hypertension.  Condition improving.  Eliquis to continue.      She is still oxygen dependent and exhibits oxygen desaturation while not using supplementary oxygen at the house.               Her lower extremity DVT assessment was negative.  Impressive  pulmonary hypertension confirmed on echo, I suspect it is getting better.  Her son and I discussed continuing anticoagulation long-term.  Eliquis is working out well.    I suspect she will be off home oxygen by the time she comes back to the clinic.             David Betancur MD  08/08/2023   10:40 AM

## 2023-08-08 NOTE — ASSESSMENT & PLAN NOTE
She is now on Eliquis.  There was no AFib by Electrocardiogram on recent hospitalization.  Her rhythm is regular today.  Condition stable.

## 2023-08-08 NOTE — ASSESSMENT & PLAN NOTE
July 2023 event.  Extensive thrombus noted.  She had hypoxemia and severe pulmonary hypertension.  Condition improving.  Eliquis to continue.      She is still oxygen dependent and exhibits oxygen desaturation while not using supplementary oxygen at the house.

## 2023-08-08 NOTE — ASSESSMENT & PLAN NOTE
Acute pulmonary hypertension confirmed on July 2023 echo related to acute pulmonary embolus.  I suspect her PA pressures will improve.  The echo showed normal ejection fraction with LVH.

## 2023-08-10 ENCOUNTER — EXTERNAL HOME HEALTH (OUTPATIENT)
Dept: HOME HEALTH SERVICES | Facility: HOSPITAL | Age: 88
End: 2023-08-10
Payer: MEDICARE

## 2023-09-15 ENCOUNTER — PATIENT MESSAGE (OUTPATIENT)
Dept: FAMILY MEDICINE | Facility: CLINIC | Age: 88
End: 2023-09-15
Payer: MEDICARE

## 2023-09-22 DIAGNOSIS — F33.42 RECURRENT MAJOR DEPRESSIVE DISORDER, IN FULL REMISSION: ICD-10-CM

## 2023-09-22 DIAGNOSIS — F41.1 GENERALIZED ANXIETY DISORDER: ICD-10-CM

## 2023-09-22 RX ORDER — SERTRALINE HYDROCHLORIDE 50 MG/1
TABLET, FILM COATED ORAL
Qty: 90 TABLET | Refills: 3 | Status: SHIPPED | OUTPATIENT
Start: 2023-09-22 | End: 2023-10-11 | Stop reason: SDUPTHER

## 2023-09-22 NOTE — TELEPHONE ENCOUNTER
Care Due:                  Date            Visit Type   Department     Provider  --------------------------------------------------------------------------------                                EP -                              PRIMARY      [facility]  Last Visit: 04-      [facility]   [facility]                               -                              PRIMARY      [facility]  Next Visit: 10-      CARE ([facility])   [facility]                                                            Last  Test          Frequency    Reason                     Performed    Due Date  --------------------------------------------------------------------------------    Lipid Panel.  12 months..  rosuvastatin.............  07- 07-    [facility] Embedded Care Due Messages. Reference number: [id number].   9/22/2023 7:04:14 AM CDT

## 2023-10-11 ENCOUNTER — OFFICE VISIT (OUTPATIENT)
Dept: FAMILY MEDICINE | Facility: CLINIC | Age: 88
End: 2023-10-11
Payer: MEDICARE

## 2023-10-11 VITALS
WEIGHT: 153.31 LBS | HEIGHT: 63 IN | SYSTOLIC BLOOD PRESSURE: 138 MMHG | DIASTOLIC BLOOD PRESSURE: 78 MMHG | HEART RATE: 68 BPM | OXYGEN SATURATION: 94 % | BODY MASS INDEX: 27.16 KG/M2

## 2023-10-11 DIAGNOSIS — E78.00 HYPERCHOLESTEROLEMIA: ICD-10-CM

## 2023-10-11 DIAGNOSIS — F13.20 BENZODIAZEPINE DEPENDENCE: Chronic | ICD-10-CM

## 2023-10-11 DIAGNOSIS — F33.42 RECURRENT MAJOR DEPRESSIVE DISORDER, IN FULL REMISSION: ICD-10-CM

## 2023-10-11 DIAGNOSIS — K21.9 GASTROESOPHAGEAL REFLUX DISEASE WITHOUT ESOPHAGITIS: Chronic | ICD-10-CM

## 2023-10-11 DIAGNOSIS — F41.1 GENERALIZED ANXIETY DISORDER: ICD-10-CM

## 2023-10-11 DIAGNOSIS — N18.32 STAGE 3B CHRONIC KIDNEY DISEASE: Chronic | ICD-10-CM
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
## 2023-10-11 DIAGNOSIS — Z99.81 ON HOME OXYGEN THERAPY: ICD-10-CM

## 2023-10-11 DIAGNOSIS — F01.50 VASCULAR DEMENTIA WITHOUT BEHAVIORAL DISTURBANCE: Primary | ICD-10-CM

## 2023-10-11 DIAGNOSIS — I48.0 PAROXYSMAL ATRIAL FIBRILLATION: Chronic | ICD-10-CM

## 2023-10-11 DIAGNOSIS — I26.92 ACUTE SADDLE PULMONARY EMBOLISM WITHOUT ACUTE COR PULMONALE: ICD-10-CM

## 2023-10-11 DIAGNOSIS — I10 ESSENTIAL HYPERTENSION: Chronic | ICD-10-CM

## 2023-10-11 PROCEDURE — 1159F MED LIST DOCD IN RCRD: CPT | Mod: HCNC,CPTII,S$GLB, | Performed by: FAMILY MEDICINE

## 2023-10-11 PROCEDURE — 3288F PR FALLS RISK ASSESSMENT DOCUMENTED: ICD-10-PCS | Mod: HCNC,CPTII,S$GLB, | Performed by: FAMILY MEDICINE

## 2023-10-11 PROCEDURE — G0008 ADMIN INFLUENZA VIRUS VAC: HCPCS | Mod: HCNC,S$GLB,, | Performed by: FAMILY MEDICINE

## 2023-10-11 PROCEDURE — 3078F PR MOST RECENT DIASTOLIC BLOOD PRESSURE < 80 MM HG: ICD-10-PCS | Mod: HCNC,CPTII,S$GLB, | Performed by: FAMILY MEDICINE

## 2023-10-11 PROCEDURE — 90694 FLU VACCINE - QUADRIVALENT - ADJUVANTED: ICD-10-PCS | Mod: HCNC,S$GLB,, | Performed by: FAMILY MEDICINE

## 2023-10-11 PROCEDURE — 99214 PR OFFICE/OUTPT VISIT, EST, LEVL IV, 30-39 MIN: ICD-10-PCS | Mod: HCNC,S$GLB,, | Performed by: FAMILY MEDICINE

## 2023-10-11 PROCEDURE — 90694 VACC AIIV4 NO PRSRV 0.5ML IM: CPT | Mod: HCNC,S$GLB,, | Performed by: FAMILY MEDICINE

## 2023-10-11 PROCEDURE — 99999 PR PBB SHADOW E&M-EST. PATIENT-LVL III: CPT | Mod: PBBFAC,HCNC,, | Performed by: FAMILY MEDICINE

## 2023-10-11 PROCEDURE — 1125F PR PAIN SEVERITY QUANTIFIED, PAIN PRESENT: ICD-10-PCS | Mod: HCNC,CPTII,S$GLB, | Performed by: FAMILY MEDICINE

## 2023-10-11 PROCEDURE — 1157F PR ADVANCE CARE PLAN OR EQUIV PRESENT IN MEDICAL RECORD: ICD-10-PCS | Mod: HCNC,CPTII,S$GLB, | Performed by: FAMILY MEDICINE

## 2023-10-11 PROCEDURE — 99214 OFFICE O/P EST MOD 30 MIN: CPT | Mod: HCNC,S$GLB,, | Performed by: FAMILY MEDICINE

## 2023-10-11 PROCEDURE — 1160F RVW MEDS BY RX/DR IN RCRD: CPT | Mod: HCNC,CPTII,S$GLB, | Performed by: FAMILY MEDICINE

## 2023-10-11 PROCEDURE — 1100F PR PT FALLS ASSESS DOC 2+ FALLS/FALL W/INJURY/YR: ICD-10-PCS | Mod: HCNC,CPTII,S$GLB, | Performed by: FAMILY MEDICINE

## 2023-10-11 PROCEDURE — 1160F PR REVIEW ALL MEDS BY PRESCRIBER/CLIN PHARMACIST DOCUMENTED: ICD-10-PCS | Mod: HCNC,CPTII,S$GLB, | Performed by: FAMILY MEDICINE

## 2023-10-11 PROCEDURE — 3078F DIAST BP <80 MM HG: CPT | Mod: HCNC,CPTII,S$GLB, | Performed by: FAMILY MEDICINE

## 2023-10-11 PROCEDURE — 3075F PR MOST RECENT SYSTOLIC BLOOD PRESS GE 130-139MM HG: ICD-10-PCS | Mod: HCNC,CPTII,S$GLB, | Performed by: FAMILY MEDICINE

## 2023-10-11 PROCEDURE — 1159F PR MEDICATION LIST DOCUMENTED IN MEDICAL RECORD: ICD-10-PCS | Mod: HCNC,CPTII,S$GLB, | Performed by: FAMILY MEDICINE

## 2023-10-11 PROCEDURE — 1157F ADVNC CARE PLAN IN RCRD: CPT | Mod: HCNC,CPTII,S$GLB, | Performed by: FAMILY MEDICINE

## 2023-10-11 PROCEDURE — 1125F AMNT PAIN NOTED PAIN PRSNT: CPT | Mod: HCNC,CPTII,S$GLB, | Performed by: FAMILY MEDICINE

## 2023-10-11 PROCEDURE — G0008 FLU VACCINE - QUADRIVALENT - ADJUVANTED: ICD-10-PCS | Mod: HCNC,S$GLB,, | Performed by: FAMILY MEDICINE

## 2023-10-11 PROCEDURE — 1100F PTFALLS ASSESS-DOCD GE2>/YR: CPT | Mod: HCNC,CPTII,S$GLB, | Performed by: FAMILY MEDICINE

## 2023-10-11 PROCEDURE — 99999 PR PBB SHADOW E&M-EST. PATIENT-LVL III: ICD-10-PCS | Mod: PBBFAC,HCNC,, | Performed by: FAMILY MEDICINE

## 2023-10-11 PROCEDURE — 3075F SYST BP GE 130 - 139MM HG: CPT | Mod: HCNC,CPTII,S$GLB, | Performed by: FAMILY MEDICINE

## 2023-10-11 PROCEDURE — 3288F FALL RISK ASSESSMENT DOCD: CPT | Mod: HCNC,CPTII,S$GLB, | Performed by: FAMILY MEDICINE

## 2023-10-11 RX ORDER — MEMANTINE HYDROCHLORIDE 5 MG/1
5 TABLET ORAL DAILY
Qty: 90 TABLET | Refills: 3 | Status: SHIPPED | OUTPATIENT
Start: 2023-10-11 | End: 2023-12-28 | Stop reason: SDUPTHER

## 2023-10-11 RX ORDER — METOPROLOL SUCCINATE 25 MG/1
25 TABLET, EXTENDED RELEASE ORAL DAILY
Qty: 90 TABLET | Refills: 3 | Status: SHIPPED | OUTPATIENT
Start: 2023-10-11 | End: 2023-12-28 | Stop reason: SDUPTHER

## 2023-10-11 RX ORDER — ROSUVASTATIN CALCIUM 10 MG/1
10 TABLET, COATED ORAL DAILY
Qty: 90 TABLET | Refills: 3 | Status: SHIPPED | OUTPATIENT
Start: 2023-10-11 | End: 2023-12-28 | Stop reason: SDUPTHER

## 2023-10-11 RX ORDER — SERTRALINE HYDROCHLORIDE 50 MG/1
TABLET, FILM COATED ORAL
Qty: 90 TABLET | Refills: 3 | Status: SHIPPED | OUTPATIENT
Start: 2023-10-11 | End: 2023-12-28 | Stop reason: SDUPTHER

## 2023-10-11 NOTE — PROGRESS NOTES
"PATIENT VISIT FAMILY MEDICINE    CC:   Chief Complaint   Patient presents with    Follow-up    Hypertension    Fall     Hurt her ribs Lt        HPI: Alessandra Hidalgo  is a 87 y.o. female:    Patient is known to me.  Patient presents with family member/caretaker for routine follow up on chronic conditions. Patient is a 88 YO F w/ PMH of chronic PE (last 7/23 hospitalized with saddle embolus; d/c with 2 L home O2 and remains 90% at rest w/o O2), vascular dementia (2022 MMSE 19/30), depression and anxiety, CKD, Afib accompanied by her son. She presents today reporting a fall from her bed 1 week ago which resulted in L-sided rib pain. Confirmed she hit her head but denies syncope, HA, dizziness. Denied environmental exacerbations in home to falling, i.e., rugs, carpet, chair legs. Denied having had a fall like this recently (notes dating to 2022 do not mention falls). Also denies CP and increased SOB from baseline today.     Pt denies urinary frequency, urine odor change, stool changes. Pt denies changes to memory since last visit. Son reports her memory changes are very "gradual" and mild.    Pt reports mood has slightly declined since last visit. Reports occasionally feeling depressed. Pt had difficulty in past weaning Xanax, takes prn for sleep and Zoloft 50 mg. No insomnia reported today.    Lives with her son, Omid. Other son (Kamlesh per previous note) accompanies her today who also checks in on her regularly. Pt previously had home health after 7/23 hospitalization but recently ceased a/b 3 weeks ago. Pt is interested in resuming OT/PT at home to improve her balance and environmental exacerbations.    PMHX:   Past Medical History:   Diagnosis Date    Anxiety and depression     Benzodiazepine dependence     CKD (chronic kidney disease) stage 3, GFR 30-59 ml/min     Baseline Cr 1-1.3    Paroxysmal atrial fibrillation     OAC warfarin discontinued by Cardiology 7/13/21 due to increase fall risk.  No longer on oral " anticoagulation    Vascular dementia without behavioral disturbance     Tried Aricept, but did not tolerate       PSHX:   Past Surgical History:   Procedure Laterality Date    APPENDECTOMY      CATARACT EXTRACTION W/ INTRAOCULAR LENS  IMPLANT, BILATERAL      HYSTERECTOMY      Partial     LEFT KNEE REPLACEMENT         FAMHX:   Family History   Problem Relation Age of Onset    Cancer Mother     Stroke Daughter     Heart disease Neg Hx        SOCHX:   Social History     Socioeconomic History    Marital status:    Tobacco Use    Smoking status: Former     Current packs/day: 0.00     Types: Cigarettes     Passive exposure: Past    Smokeless tobacco: Former   Substance and Sexual Activity    Alcohol use: Yes     Alcohol/week: 1.0 standard drink of alcohol     Types: 1 Glasses of wine per week    Drug use: No    Sexual activity: Not Currently   Social History Narrative    Lives with son Zuhair moved in 2019 to assist with care. 9/2021  passed away. They have 4 children. She is originally from [place]. She is sedentary. She is a practicing [employer] and she participates in [employer]. DME: rollator walker. Tub bench     Social Determinants of Health     Stress: Stress Concern Present (7/31/2020)    [facility] of Occupational Health - Occupational Stress Questionnaire     Feeling of Stress : Rather much       ALLERGIES:   Review of patient's allergies indicates:   Allergen Reactions    Atorvastatin     Prozac [fluoxetine]      dizziness       MEDS:   Current Outpatient Medications:     ALPRAZolam (XANAX) 0.5 MG tablet, TAKE 1 TABLET BY MOUTH AT BEDTIME AS NEEDED FOR INSOMNIA OR ANXIETY., Disp: 30 tablet, Rfl: 5    apixaban (ELIQUIS) 5 mg Tab, Take 1 tablet (5 mg total) by mouth 2 (two) times daily., Disp: 180 tablet, Rfl: 3    memantine (NAMENDA) 5 MG Tab, Take 1 tablet (5 mg total) by mouth once daily., Disp: 90 tablet, Rfl: 3    metoprolol succinate (TOPROL-XL) 25 MG 24 hr tablet, Take 1  "tablet (25 mg total) by mouth once daily., Disp: 90 tablet, Rfl: 3    omeprazole (PRILOSEC) 40 MG capsule, Take 1 capsule (40 mg total) by mouth once daily. (Patient not taking: Reported on 7/28/2023), Disp: 90 capsule, Rfl: 3    rosuvastatin (CRESTOR) 10 MG tablet, Take 1 tablet (10 mg total) by mouth once daily., Disp: 90 tablet, Rfl: 3    sertraline (ZOLOFT) 50 MG tablet, Take 1 tablet (50 mg total) by mouth once daily., Disp: 90 tablet, Rfl: 3    OBJECTIVE:   Vitals:    10/11/23 1306   BP: 138/78   BP Location: Left arm   Patient Position: Sitting   BP Method: Large (Manual)   Pulse: 68   SpO2: (!) 94%   Weight: 69.6 kg (153 lb 5.3 oz)   Height: 5' 3" (1.6 m)     Body mass index is 27.16 kg/m².      Physical Exam  Vitals and nursing note reviewed.   Constitutional:       Appearance: Normal appearance.   HENT:      Head: Normocephalic.      Right Ear: Decreased hearing noted.      Left Ear: Decreased hearing noted.      Mouth/Throat:      Mouth: Mucous membranes are moist.      Pharynx: Oropharynx is clear.   Eyes:      Extraocular Movements: Extraocular movements intact.   Cardiovascular:      Rate and Rhythm: Normal rate and regular rhythm.   Pulmonary:      Effort: Pulmonary effort is normal.      Breath sounds: Normal breath sounds.   Abdominal:      General: Abdomen is flat.      Palpations: Abdomen is soft.   Musculoskeletal:      Cervical back: Normal range of motion.      Comments: No deformity noted of left ribs. No ecchymosis   Skin:     General: Skin is warm.   Neurological:      Mental Status: She is alert.   Psychiatric:         Mood and Affect: Mood normal.         Behavior: Behavior normal.         LABS:   A1C:      CBC:  Recent Labs   Lab 10/04/23  0922   WBC 7.92   RBC 4.04   Hemoglobin 12.3   Hematocrit 38.5   Platelets 270   MCV 95   MCH 30.4   MCHC 31.9 L     CMP:  Recent Labs   Lab 10/04/23  0922   Glucose 75   Calcium 9.4   Albumin 4.2   Total Protein 7.6   Sodium 140   Potassium 4.4   CO2 " 25   Chloride 105   BUN 24 H   Creatinine 1.19   Alkaline Phosphatase 70   ALT 17   AST 29   Total Bilirubin 0.6     LIPIDS:  Recent Labs   Lab 07/16/23  0010 10/04/23  0922   TSH 1.960  --    HDL  --  42   Cholesterol  --  151   Triglycerides  --  122   LDL Cholesterol  --  84.6   HDL/Cholesterol Ratio  --  27.8   Non-HDL Cholesterol  --  109   Total Cholesterol/HDL Ratio  --  3.6     TSH:  Recent Labs   Lab 07/16/23  0010   TSH 1.960         ASSESSMENT & PLAN:    Chronic PE  - Continue home oxygen regimen and ensure O2 > 92%  - Consult with respiratory therapist regarding 6 minute walk test  - Consider hematology referral for hypercoagulability workup, if interested  - OT/PT home health referral will resume after today -- please f/u with them regarding home optimization and balance training    Vascular dementia  - Stable  - Consider referral to neurology for further management  - Continue Namenda 5 mg  - Repeat MMSE at next visit, 23/30 in 2020, 19/20 in 2022    Depression and anxiety  - Continue regimen  - If depression worsens, contact us to make adjustments or dial [phone] for immediate support    HTN  - Measure at home 1x/day using cuff and bring readings to next visit    Preventive  - Flu vax today  - Shingles and COVID-19 at pharmacy   - No routine labs today  - Continue balanced diet and activity with family and friends as able to complete    CKD  - Stable  - Continue regimen    Afib  - Stable  - Continue regimen      Ngozi Barahona  [employer]  MS4    Problem List Items Addressed This Visit          Neuro    Vascular dementia without behavioral disturbance - Primary (Chronic)    Overview     - 1/24/2020 MMSE 23  - 7/14/2021 MMSE 19/30  - triedl Aricept and pt did to not tolerate  - patient declined medication and family is supportive. Today she is willing to try medication and will add Namenda 5 mg daily renal dosing  - supportive care  - counseling with anticipatory guidance to rogelio Moffett  -  doing well and good support         Relevant Medications    memantine (NAMENDA) 5 MG Tab    Other Relevant Orders    Ambulatory referral/consult to Home Health       Psychiatric    Benzodiazepine dependence (Chronic)    Overview     - she was unable to tolerate off of alprazolam  - okay to continue alprazolam 0.25-0.5 mg nightly as needed  -  reviewed and as expected         Generalized anxiety disorder (Chronic)    Overview     - with benzodiazepine dependence   - continue Sertraline 50 mg daily         Relevant Medications    sertraline (ZOLOFT) 50 MG tablet    Recurrent major depressive disorder, in full remission (Chronic)    Overview     - well controlled  - continue current medication         Relevant Medications    sertraline (ZOLOFT) 50 MG tablet       Cardiac/Vascular    Hypercholesterolemia (Chronic)    Overview     - well controlled  - continue current medication         Relevant Medications    rosuvastatin (CRESTOR) 10 MG tablet    Paroxysmal atrial fibrillation (Chronic)    Overview     - followed by Cardiology  - OAC warfarin discontinued by Cardiology 7/13/21 due to increase fall risk.  Note reviewed  - RRR today  - no longer on oral anticoagulation         Essential hypertension    Overview     - well controlled  - continue current medication         Relevant Medications    metoprolol succinate (TOPROL-XL) 25 MG 24 hr tablet       Renal/    Stage 3b chronic kidney disease (Chronic)    Overview     - 11/15/21 Renal US:  Medical renal disease  - baseline creatinine 1.1-1.2  - baseline creatinine 40-45  - low-salt diet  - good water hydration  - avoid nephrotoxic medication  - worsening            Hematology    Acute saddle pulmonary embolism without acute cor pulmonale    Overview     Dx 7/2023. On eliquis            GI    Gastroesophageal reflux disease without esophagitis (Chronic)    Overview     - well controlled  - continue current management plan   - patient encouraged to notify me with any  changes  - has worsened when she attempts to stop omeprazole.          Other Visit Diagnoses       On home oxygen therapy    - 2/2 to b/l PE dx 7/2023. 6 min walk to reassess patient's O2 requirement     Relevant Orders    Six Minute Walk Test to qualify for Home Oxygen    Fall, initial encounter    - limited hx. Home health for PT/OT    Relevant Orders    Ambulatory referral/consult to Home Health              Follow up in about 6 months (around 4/11/2024) for check up.      RTC/ED precautions discussed where applicable.   Encouraged patient to let me know if there are any further questions/concerns.     Advise patient/caretaker to check with insurance regarding orders to avoid unexpected fees/costs.     The patient/caretaker indicates understanding of these issues and agrees with the plan.    Ngozi Barahona, MS4    I hereby acknowledge that I am relying upon documentation authored by a medical student working under my supervision and further I hereby attest that I have verified the student documentation or findings by personally re-performing the physical exam and medical decision making activities of the Evaluation and Management service to be billed.    Dr. Quoc Hudson MD  Family Medicine

## 2023-10-16 PROBLEM — J96.01 ACUTE RESPIRATORY FAILURE WITH HYPOXIA: Status: RESOLVED | Noted: 2023-07-15 | Resolved: 2023-10-16

## 2023-10-20 ENCOUNTER — TELEPHONE (OUTPATIENT)
Dept: FAMILY MEDICINE | Facility: CLINIC | Age: 88
End: 2023-10-20
Payer: MEDICARE

## 2023-10-20 NOTE — TELEPHONE ENCOUNTER
We have received the home health referral for this patient.     We can do an RN start of care on 10/23/2023.   with PT/OT evals week of 10/23.     Please let us know if you are ok with the above plan of care.

## 2023-10-23 PROBLEM — N39.0 URINARY TRACT INFECTION WITHOUT HEMATURIA: Status: RESOLVED | Noted: 2023-07-16 | Resolved: 2023-10-23

## 2023-10-23 PROCEDURE — G0180 MD CERTIFICATION HHA PATIENT: HCPCS | Mod: ,,, | Performed by: FAMILY MEDICINE

## 2023-10-23 PROCEDURE — G0180 PR HOME HEALTH MD CERTIFICATION: ICD-10-PCS | Mod: ,,, | Performed by: FAMILY MEDICINE

## 2023-11-16 DIAGNOSIS — Z74.09 IMPAIRED MOBILITY: ICD-10-CM

## 2023-11-16 DIAGNOSIS — F01.50 VASCULAR DEMENTIA WITHOUT BEHAVIORAL DISTURBANCE: Primary | ICD-10-CM

## 2023-11-30 ENCOUNTER — EXTERNAL HOME HEALTH (OUTPATIENT)
Dept: HOME HEALTH SERVICES | Facility: HOSPITAL | Age: 88
End: 2023-11-30
Payer: MEDICARE

## 2023-12-28 DIAGNOSIS — K21.9 GASTROESOPHAGEAL REFLUX DISEASE WITHOUT ESOPHAGITIS: Chronic | ICD-10-CM

## 2023-12-28 DIAGNOSIS — F41.1 GENERALIZED ANXIETY DISORDER: ICD-10-CM

## 2023-12-28 DIAGNOSIS — F33.42 RECURRENT MAJOR DEPRESSIVE DISORDER, IN FULL REMISSION: ICD-10-CM

## 2023-12-28 DIAGNOSIS — F01.50 VASCULAR DEMENTIA WITHOUT BEHAVIORAL DISTURBANCE: ICD-10-CM

## 2023-12-28 DIAGNOSIS — I10 ESSENTIAL HYPERTENSION: Chronic | ICD-10-CM

## 2023-12-28 DIAGNOSIS — E78.00 HYPERCHOLESTEROLEMIA: ICD-10-CM

## 2023-12-28 DIAGNOSIS — F13.20 BENZODIAZEPINE DEPENDENCE: ICD-10-CM

## 2023-12-28 RX ORDER — SERTRALINE HYDROCHLORIDE 50 MG/1
TABLET, FILM COATED ORAL
Qty: 90 TABLET | Refills: 3 | Status: SHIPPED | OUTPATIENT
Start: 2023-12-28

## 2023-12-28 RX ORDER — MEMANTINE HYDROCHLORIDE 5 MG/1
5 TABLET ORAL DAILY
Qty: 90 TABLET | Refills: 3 | Status: SHIPPED | OUTPATIENT
Start: 2023-12-28 | End: 2024-12-27

## 2023-12-28 RX ORDER — ROSUVASTATIN CALCIUM 10 MG/1
10 TABLET, COATED ORAL DAILY
Qty: 90 TABLET | Refills: 3 | Status: SHIPPED | OUTPATIENT
Start: 2023-12-28

## 2023-12-28 RX ORDER — OMEPRAZOLE 40 MG/1
40 CAPSULE, DELAYED RELEASE ORAL DAILY
Qty: 90 CAPSULE | Refills: 3 | Status: SHIPPED | OUTPATIENT
Start: 2023-12-28 | End: 2024-01-25

## 2023-12-28 RX ORDER — METOPROLOL SUCCINATE 25 MG/1
25 TABLET, EXTENDED RELEASE ORAL DAILY
Qty: 90 TABLET | Refills: 3 | Status: SHIPPED | OUTPATIENT
Start: 2023-12-28

## 2023-12-28 RX ORDER — ALPRAZOLAM 0.5 MG/1
TABLET ORAL
Qty: 30 TABLET | Refills: 5 | Status: ON HOLD | OUTPATIENT
Start: 2023-12-28 | End: 2024-01-31 | Stop reason: SDUPTHER

## 2023-12-28 NOTE — TELEPHONE ENCOUNTER
No care due was identified.  [facility] Embedded Care Due Messages. Reference number: [id number].   12/28/2023 1:44:33 PM CST

## 2024-01-15 PROBLEM — I26.92 ACUTE SADDLE PULMONARY EMBOLISM WITHOUT ACUTE COR PULMONALE: Status: RESOLVED | Noted: 2023-07-17 | Resolved: 2024-01-15

## 2024-01-23 ENCOUNTER — OFFICE VISIT (OUTPATIENT)
Dept: FAMILY MEDICINE | Facility: CLINIC | Age: 89
End: 2024-01-23
Payer: MEDICARE

## 2024-01-23 VITALS
SYSTOLIC BLOOD PRESSURE: 130 MMHG | DIASTOLIC BLOOD PRESSURE: 80 MMHG | WEIGHT: 150.69 LBS | HEIGHT: 63 IN | HEART RATE: 73 BPM | OXYGEN SATURATION: 94 % | BODY MASS INDEX: 26.7 KG/M2

## 2024-01-23 DIAGNOSIS — M51.36 L4-L5 DISC BULGE: ICD-10-CM

## 2024-01-23 DIAGNOSIS — M47.819 FACET ARTHROPATHY OF SPINE: ICD-10-CM

## 2024-01-23 DIAGNOSIS — I10 ESSENTIAL HYPERTENSION: ICD-10-CM

## 2024-01-23 DIAGNOSIS — N18.32 STAGE 3B CHRONIC KIDNEY DISEASE: Chronic | ICD-10-CM

## 2024-01-23 DIAGNOSIS — R26.81 GAIT INSTABILITY: ICD-10-CM

## 2024-01-23 DIAGNOSIS — R29.6 RECURRENT FALLS: ICD-10-CM

## 2024-01-23 DIAGNOSIS — M54.50 BILATERAL LOW BACK PAIN WITHOUT SCIATICA, UNSPECIFIED CHRONICITY: ICD-10-CM

## 2024-01-23 DIAGNOSIS — F01.50 VASCULAR DEMENTIA WITHOUT BEHAVIORAL DISTURBANCE: Primary | Chronic | ICD-10-CM

## 2024-01-23 PROCEDURE — 1157F ADVNC CARE PLAN IN RCRD: CPT | Mod: HCNC,CPTII,S$GLB,

## 2024-01-23 PROCEDURE — 1100F PTFALLS ASSESS-DOCD GE2>/YR: CPT | Mod: HCNC,CPTII,S$GLB,

## 2024-01-23 PROCEDURE — 99999 PR PBB SHADOW E&M-EST. PATIENT-LVL IV: CPT | Mod: PBBFAC,HCNC,,

## 2024-01-23 PROCEDURE — 3288F FALL RISK ASSESSMENT DOCD: CPT | Mod: HCNC,CPTII,S$GLB,

## 2024-01-23 PROCEDURE — 99214 OFFICE O/P EST MOD 30 MIN: CPT | Mod: HCNC,S$GLB,,

## 2024-01-23 PROCEDURE — 1125F AMNT PAIN NOTED PAIN PRSNT: CPT | Mod: HCNC,CPTII,S$GLB,

## 2024-01-23 NOTE — PROGRESS NOTES
Subjective:            Chief Complaint: Follow-up  HPI   Alessandra Hidalgo is a 88 y.o. female, patient of Quoc Hudson MD with vascular dementia, recurrent major depressive disorder, anxiety, pulmonary hypertension, chronic rhinitis, atrial fib, hypertension, CKD stage 3b, GERD, osteoarthritis, known to me, presents today with with her son for an ED Follow-up    Went to ED on 01/14 after a fall. Was dx with UTI, treated with cephalexin 4 times per day x 7 days. Currently completed. Went back on yesterday with c/o back pain.   Patient unsure if she fell forward or backwards. Reports pain to her lower back. Using lidocaine patches and taking ibuprofen. Uses walker at home.   Wears O2 1L at home   Here with son who Reports memory is worsening.  Son reports an issue with home health. Will reorder.         Past Medical History:   Diagnosis Date    Anxiety and depression     Benzodiazepine dependence     CKD (chronic kidney disease) stage 3, GFR 30-59 ml/min     Baseline Cr 1-1.3    Paroxysmal atrial fibrillation     OAC warfarin discontinued by Cardiology 7/13/21 due to increase fall risk.  No longer on oral anticoagulation    Vascular dementia without behavioral disturbance     Tried Aricept, but did not tolerate       Past Surgical History:   Procedure Laterality Date    APPENDECTOMY      CATARACT EXTRACTION W/ INTRAOCULAR LENS  IMPLANT, BILATERAL      HYSTERECTOMY      Partial     LEFT KNEE REPLACEMENT         Family History   Problem Relation Age of Onset    Cancer Mother     Stroke Daughter     Heart disease Neg Hx        Social History     Socioeconomic History    Marital status:    Tobacco Use    Smoking status: Former     Current packs/day: 0.00     Types: Cigarettes     Passive exposure: Past    Smokeless tobacco: Former   Substance and Sexual Activity    Alcohol use: Yes     Alcohol/week: 1.0 standard drink of alcohol     Types: 1 Glasses of wine per week    Drug use: No    Sexual activity: Not  "Currently   Social History Narrative    Lives with son Zuhair moved in 2019 to assist with care. 9/2021  passed away. They have 4 children. She is originally from [place]. She is sedentary. She is a practicing [employer] and she participates in [employer]. DME: rollator walker. Tub bench     Social Determinants of Health     Stress: Stress Concern Present (7/31/2020)    [facility] of Occupational Health - Occupational Stress Questionnaire     Feeling of Stress : Rather much       Review of Systems   Musculoskeletal:  Positive for back pain and gait problem.         Objective:     Vitals:    01/23/24 1001   BP: 130/80   BP Location: Left arm   Patient Position: Sitting   BP Method: Large (Manual)   Pulse: 73   SpO2: (!) 94%   Weight: 68.3 kg (150 lb 11 oz)   Height: 5' 3" (1.6 m)          Physical Exam  Vitals reviewed.   Constitutional:       Appearance: Normal appearance. She is well-developed and well-groomed.   HENT:      Head: Normocephalic and atraumatic.   Cardiovascular:      Rate and Rhythm: Normal rate and regular rhythm.      Heart sounds: Normal heart sounds.   Pulmonary:      Effort: Pulmonary effort is normal.      Breath sounds: Normal breath sounds.   Musculoskeletal:      Right lower leg: No edema.      Left lower leg: No edema.      Comments: Sitting in wheelchair   Pain is to lower back. Some TTP.    Skin:     General: Skin is warm and dry.      Capillary Refill: Capillary refill takes less than 2 seconds.   Neurological:      General: No focal deficit present.      Mental Status: She is alert and oriented to person, place, and time.   Psychiatric:         Speech: Speech normal.         Behavior: Behavior is cooperative.           Laboratory:  CBC:  Recent Labs   Lab Result Units 01/14/24  2134 01/22/24  0813   WBC K/uL 8.44 9.97   RBC M/uL 3.92* 4.10   Hemoglobin g/dL 12.4 13.3   Hematocrit % 38.1 38.9   Platelets K/uL 253 303   MCV fL 97 95   MCH pg 31.6* 32.4*   MCHC " "g/dL 32.5 34.2     CMP:  Recent Labs   Lab Result Units 01/14/24  2134 01/22/24  0855   Glucose mg/dL 118* 90   Calcium mg/dL 10.0 9.3   Albumin g/dL 4.5 3.9   Total Protein g/dL 7.3 6.8   Sodium mmol/L 141 142   Potassium mmol/L 4.9 4.5   CO2 mmol/L 21* 16*   Chloride mmol/L 108 113*   BUN mg/dL 42* 22*   Alkaline Phosphatase U/L 103 80   ALT U/L 86* 33   AST U/L 81* 32   Total Bilirubin mg/dL 0.5 0.6     URINALYSIS:  Recent Labs   Lab Result Units 01/14/24  2249 01/22/24  0923   Color, UA  Yellow Yellow   Specific Gravity, UA  1.025 1.020   pH, UA  6.0 6.0   Protein, UA  Trace* 1+*   Bacteria /hpf Moderate* None   Nitrite, UA  Negative Negative   Leukocytes, UA  1+* Negative   Urobilinogen, UA EU/dL Negative Negative   Hyaline Casts, UA /lpf 1 0      LIPIDS:  No results for input(s): "TSH", "HDL", "CHOL", "TRIG", "LDLCALC", "CHOLHDL", "NONHDLCHOL", "TOTALCHOLEST" in the last 2160 hours.  TSH:  No results for input(s): "TSH" in the last 2160 hours.  A1C:  No results for input(s): "HGBA1C" in the last 2160 hours.    Assessment:         ICD-10-CM ICD-9-CM   1. Vascular dementia without behavioral disturbance  F01.50 290.40   2. Essential hypertension  I10 401.9   3. Stage 3b chronic kidney disease  N18.32 585.3   4. Gait instability  R26.81 781.2   5. Recurrent falls  R29.6 V15.88   6. Bilateral low back pain without sciatica, unspecified chronicity  M54.50 724.2   7. Facet arthropathy of spine  M47.819 721.90   8. L4-L5 disc bulge  M51.36 722.52       Plan:       Vascular dementia without behavioral disturbance  -     Ambulatory referral/consult to Home Health; Future; Expected date: 01/24/2024  Son reports worsening. C/w memantine, follow up with Neurology if needed.     Essential hypertension  Controlled with current regimen.     Stage 3b chronic kidney disease  Stable. Avoid nephrotoxic drugs, renally dose all medications.     Gait instability  Recurrent falls  -     Ambulatory referral/consult to Home Health; " Future; Expected date: 01/24/2024  Encouraged to continue use of walker. Will order PT/OT/RN to evaluate and treat at home.     Bilateral low back pain without sciatica, unspecified chronicity  Facet arthropathy of spine  L4-L5 disc bulge  -     Ambulatory referral/consult to Orthopedics; Future; Expected date: 01/30/2024    Patient still with some back pain. C/w heat and lodicaine patches for now. Instructed to limit use of ibuprofen due to renal function. Will order home PT, if this does not help, may need to see pain management. Referral to ortho for further evaluation based on CT findings.     CT lumbar spine done in ED on 01/14/24 shows the following:  FINDINGS:  Alignment: There is levoconvex scoliosis centered at approximately L3.   Vertebrae: There is a high cortical step-off of the T12 vertebral body without significant height loss, compatible with a microtrabecular fracture.  No additional fractures are seen. Remaining vertebral body heights are preserved.  There is no aggressive osseous lesion.  Discs: There is moderate disc height loss at L4-L5 and mild disc height loss at L2-L3.  Degenerative changes: There are multilevel degenerative changes of the lumbar spine.  T12-L1: There is bilateral facet arthropathy.  L1-L2: There is a large posterior disc bulge, asymmetric to the left, resulting in at least moderate spinal canal stenosis and at least moderate left neural foraminal narrowing.  L2-L3: There is a posterior disc bulge and mild bilateral facet arthropathy resulting in at least mild spinal canal stenosis.  L3-L4: There is a posterior disc bulge and right greater than left facet arthropathy resulting in at least mild spinal canal stenosis.  L4-L5: There is a small posterior disc bulge and mild bilateral facet arthropathy.  L5-S1: There is moderate bilateral facet arthropathy  Miscellaneous: There is moderate atherosclerosis.     Impression:  Microtrabecular fracture of the T12 vertebral body,  correlate with point tenderness.  Multilevel degenerative changes as above.      If symptoms worsen, go to ER.  If symptoms do not improve, return to clinic.   Keep appointments with all specialists.     Patient and son verbalizes understanding and agrees with current treatment plan.    Follow up in about 1 month (around 2/23/2024).     Patient's Medications   New Prescriptions    No medications on file   Previous Medications    ALPRAZOLAM (XANAX) 0.5 MG TABLET    TAKE 1 TABLET BY MOUTH AT BEDTIME AS NEEDED FOR INSOMNIA OR ANXIETY.    APIXABAN (ELIQUIS) 5 MG TAB    Take 1 tablet (5 mg total) by mouth 2 (two) times daily.    LIDOCAINE (LIDODERM) 5 %    Place 1 patch onto the skin once daily. Remove & Discard patch within 12 hours or as directed by MD    MEMANTINE (NAMENDA) 5 MG TAB    Take 1 tablet (5 mg total) by mouth once daily.    METOPROLOL SUCCINATE (TOPROL-XL) 25 MG 24 HR TABLET    Take 1 tablet (25 mg total) by mouth once daily.    OMEPRAZOLE (PRILOSEC) 40 MG CAPSULE    Take 1 capsule (40 mg total) by mouth once daily.    ROSUVASTATIN (CRESTOR) 10 MG TABLET    Take 1 tablet (10 mg total) by mouth once daily.    SERTRALINE (ZOLOFT) 50 MG TABLET    Take 1 tablet (50 mg total) by mouth once daily.   Modified Medications    No medications on file   Discontinued Medications    No medications on file         Lauren Caballero NP

## 2024-01-23 NOTE — PATIENT INSTRUCTIONS
Try heat, continue with lidocaine patches. Limit ibuprofen use. Over the counter pain rubs.   Try PT and if this does not help, we can put referral for Ortho/Pain management.     Schedule appt in 1 month for follow up

## 2024-01-31 ENCOUNTER — TELEPHONE (OUTPATIENT)
Dept: FAMILY MEDICINE | Facility: CLINIC | Age: 89
End: 2024-01-31
Payer: MEDICARE

## 2024-01-31 DIAGNOSIS — F13.20 BENZODIAZEPINE DEPENDENCE: ICD-10-CM

## 2024-02-01 PROBLEM — I21.4 NSTEMI (NON-ST ELEVATED MYOCARDIAL INFARCTION): Status: RESOLVED | Noted: 2024-02-01 | Resolved: 2024-02-01

## 2024-02-01 PROBLEM — F13.20 BENZODIAZEPINE DEPENDENCE: Chronic | Status: RESOLVED | Noted: 2020-01-23 | Resolved: 2024-02-01

## 2024-02-01 PROBLEM — S22.080G COMPRESSION FRACTURE OF T12 VERTEBRA WITH DELAYED HEALING: Status: ACTIVE | Noted: 2024-02-01

## 2024-02-01 PROBLEM — I27.29 PULMONARY HYPERTENSION WITH CHRONIC COR PULMONALE: Status: ACTIVE | Noted: 2023-07-17

## 2024-02-01 PROBLEM — I26.99 RECURRENT PULMONARY EMBOLISM: Status: ACTIVE | Noted: 2023-07-15

## 2024-02-01 PROBLEM — I21.4 NSTEMI (NON-ST ELEVATED MYOCARDIAL INFARCTION): Status: ACTIVE | Noted: 2024-02-01

## 2024-02-01 RX ORDER — ALPRAZOLAM 0.5 MG/1
TABLET ORAL
Qty: 30 TABLET | Refills: 5 | Status: SHIPPED | OUTPATIENT
Start: 2024-02-01

## 2024-02-01 NOTE — TELEPHONE ENCOUNTER
No care due was identified.  [facility] Embedded Care Due Messages. Reference number: [id number].   1/31/2024 9:35:20 PM CST

## 2024-02-05 PROBLEM — R53.1 GENERALIZED WEAKNESS: Status: ACTIVE | Noted: 2024-02-05

## 2024-02-06 PROBLEM — E87.5 HYPERKALEMIA: Status: ACTIVE | Noted: 2024-02-06

## 2024-02-06 PROBLEM — R79.89 ELEVATED LFTS: Status: ACTIVE | Noted: 2024-02-06

## 2024-02-08 PROBLEM — E87.5 HYPERKALEMIA: Status: RESOLVED | Noted: 2024-02-06 | Resolved: 2024-02-08

## 2024-02-10 PROBLEM — R33.9 URINE RETENTION: Status: ACTIVE | Noted: 2024-02-10

## 2024-02-13 PROBLEM — Z51.5 HOSPICE CARE PATIENT: Status: ACTIVE | Noted: 2024-02-13

## 2024-02-16 ENCOUNTER — OUTPATIENT CASE MANAGEMENT (OUTPATIENT)
Dept: ADMINISTRATIVE | Facility: OTHER | Age: 89
End: 2024-02-16
Payer: MEDICARE

## 2024-02-16 NOTE — PROGRESS NOTES
Outpatient Care Management  Patient Not Qualified    Patient: Alessandra Hidalgo  MRN:  [id number]  Date of Service:  2/16/2024  Completed by:  Marsha Ponce RN    Chief Complaint   Patient presents with    OPCM Enrollment Call    Case Closure     2/16/24  Mrs. Hidalgo was discharged home with [facility] on 2/13/24.       Patient Summary           Reason Not Qualified:  Other (see comment)

## 2024-04-15 ENCOUNTER — TELEPHONE (OUTPATIENT)
Dept: FAMILY MEDICINE | Facility: CLINIC | Age: 89
End: 2024-04-15
Payer: MEDICARE

## 2024-04-15 NOTE — TELEPHONE ENCOUNTER
Called pharmacy ([facility]) to see what was needed. Marianna said she got a prescription refill request from HH and was wondering does she bill home health or her regular insurance. I informed her I am not sure and may need to call pt or HH. Marianna said she will bill under insurance and if that doesn't work she will do through HH. Everything was understood.

## 2024-04-15 NOTE — TELEPHONE ENCOUNTER
----- Message from Buzz Wade sent at 4/15/2024  1:32 PM CDT -----  Type:  Needs Medical Advice    Who Called: Marianna DILLARD  Pharmacy name and phone #:  [facility]/pharmacy #[id number] - [place] - [address] AT [facility] [facility]   Phone: [phone]  Fax: [phone]  Additional Information: would like a call regarding medication be filled/billed  [facility] medication that was sent over today 04/15/2024 please call a via fax

## 2024-05-06 PROBLEM — I26.99 RECURRENT PULMONARY EMBOLISM: Status: RESOLVED | Noted: 2023-07-15 | Resolved: 2024-05-06

## 2024-05-13 PROBLEM — N39.0 URINARY TRACT INFECTION WITHOUT HEMATURIA: Status: RESOLVED | Noted: 2023-07-16 | Resolved: 2024-05-13

## 2024-07-15 NOTE — PROGRESS NOTES
Patient called 01/27/20 to maude appt 01/28/20 to 01/30/20.   Detail Level: Detailed Size Of Lesion: 0.8 X Size Of Lesion In Cm (Optional): 0 Name Of The Referring Provider For Procedure: Mono Alonso MD Incorporate Mauc In Note: Yes Location Indication Override (Is Already Calculated Based On Selected Body Location): Area M

## 2024-07-16 NOTE — PROGRESS NOTES
"Subjective:      Patient ID: Alessandra Hidalgo is a 81 y.o. female.    Chief Complaint: Urinary Tract Infection (painful urination, also burning when urinate,for one week)    HPI: 81y/oWF, coughing significantly less.  Her breathing is much easier.  Now presents with several days of dysuria and suprapubic discomfort.  No fever/chills.    Review of Systems   Constitutional: Negative.    HENT: Negative.    Eyes: Negative.    Respiratory: Positive for cough. Negative for apnea, chest tightness, shortness of breath, wheezing and stridor.    Cardiovascular: Negative.  Negative for chest pain.   Gastrointestinal: Negative.    Endocrine: Negative.    Genitourinary: Positive for dysuria, frequency, pelvic pain and urgency. Negative for flank pain and hematuria.   Musculoskeletal: Negative.    Skin: Negative.    Allergic/Immunologic: Negative.    Neurological: Negative.    Hematological: Negative.    Psychiatric/Behavioral: Negative.        Objective:   /72 (BP Location: Left arm, Patient Position: Sitting, BP Method: Manual)  Pulse 78  Temp 98.5 °F (36.9 °C) (Oral)   Resp 16  Ht 5' 3" (1.6 m)  Wt 76.1 kg (167 lb 12.3 oz)  SpO2 97%  BMI 29.72 kg/m2    Physical Exam   Constitutional: She is oriented to person, place, and time. She appears well-developed and well-nourished. No distress.   HENT:   Head: Normocephalic and atraumatic.   Mouth/Throat: Oropharynx is clear and moist.   Eyes: Conjunctivae are normal. Pupils are equal, round, and reactive to light.   Neck: Normal range of motion.   Cardiovascular: Normal rate, regular rhythm and normal heart sounds.    Abdominal: Soft. Bowel sounds are normal. There is no hepatosplenomegaly. There is no tenderness. There is no rebound and no CVA tenderness.   Musculoskeletal: Normal range of motion.   Neurological: She is alert and oriented to person, place, and time.   Skin: Skin is warm and dry. She is not diaphoretic.   Psychiatric: She has a normal mood and affect. Her " Called pt, voiced understanding. Thanks     behavior is normal.   Nursing note and vitals reviewed.      Assessment:     1. Urinary tract infection without hematuria, site unspecified    2. Cough , better for now.     Plan:     Urinary tract infection without hematuria, site unspecified  -     Urinalysis; Future; Expected date: 3/7/17  -     Urine culture; Future; Expected date: 3/7/17  -     sulfamethoxazole-trimethoprim 800-160mg (BACTRIM DS) 800-160 mg Tab; Take 1 tablet by mouth 2 (two) times daily.  Dispense: 6 tablet; Refill: 0    Cough

## 2025-01-30 DIAGNOSIS — Z00.00 ENCOUNTER FOR MEDICARE ANNUAL WELLNESS EXAM: ICD-10-CM

## 2025-06-20 ENCOUNTER — PATIENT MESSAGE (OUTPATIENT)
Dept: PRIMARY CARE CLINIC | Facility: CLINIC | Age: 89
End: 2025-06-20
Payer: MEDICARE

## 2025-06-23 ENCOUNTER — OFFICE VISIT (OUTPATIENT)
Dept: PRIMARY CARE CLINIC | Facility: CLINIC | Age: 89
End: 2025-06-23
Attending: FAMILY MEDICINE
Payer: MEDICARE

## 2025-06-23 DIAGNOSIS — F01.50 VASCULAR DEMENTIA WITHOUT BEHAVIORAL DISTURBANCE: Chronic | ICD-10-CM

## 2025-06-23 DIAGNOSIS — Z86.711 HISTORY OF PULMONARY EMBOLISM: ICD-10-CM

## 2025-06-23 DIAGNOSIS — R79.89 ELEVATED LFTS: ICD-10-CM

## 2025-06-23 DIAGNOSIS — N18.32 STAGE 3B CHRONIC KIDNEY DISEASE: Chronic | ICD-10-CM

## 2025-06-23 DIAGNOSIS — R53.1 GENERALIZED WEAKNESS: Primary | ICD-10-CM

## 2025-06-23 DIAGNOSIS — Z97.8 FOLEY CATHETER IN PLACE: ICD-10-CM

## 2025-06-23 DIAGNOSIS — I48.0 PAROXYSMAL ATRIAL FIBRILLATION: Chronic | ICD-10-CM

## 2025-06-23 DIAGNOSIS — I27.29 PULMONARY HYPERTENSION WITH CHRONIC COR PULMONALE: ICD-10-CM

## 2025-06-23 PROBLEM — Z99.81 CHRONIC RESPIRATORY FAILURE WITH HYPOXIA, ON HOME O2 THERAPY: Status: ACTIVE | Noted: 2025-06-23

## 2025-06-23 PROBLEM — F32.A ANXIETY AND DEPRESSION: Status: RESOLVED | Noted: 2023-07-15 | Resolved: 2025-06-23

## 2025-06-23 PROBLEM — S22.080G COMPRESSION FRACTURE OF T12 VERTEBRA WITH DELAYED HEALING: Status: RESOLVED | Noted: 2024-02-01 | Resolved: 2025-06-23

## 2025-06-23 PROBLEM — F41.1 GENERALIZED ANXIETY DISORDER: Chronic | Status: RESOLVED | Noted: 2020-01-23 | Resolved: 2025-06-23

## 2025-06-23 PROBLEM — Z99.81 CHRONIC RESPIRATORY FAILURE WITH HYPOXIA, ON HOME O2 THERAPY: Status: RESOLVED | Noted: 2025-06-23 | Resolved: 2025-06-23

## 2025-06-23 PROBLEM — R33.9 URINE RETENTION: Status: RESOLVED | Noted: 2024-02-10 | Resolved: 2025-06-23

## 2025-06-23 PROBLEM — F33.42 RECURRENT MAJOR DEPRESSIVE DISORDER, IN FULL REMISSION: Chronic | Status: RESOLVED | Noted: 2020-01-23 | Resolved: 2025-06-23

## 2025-06-23 PROBLEM — F41.9 ANXIETY AND DEPRESSION: Status: RESOLVED | Noted: 2023-07-15 | Resolved: 2025-06-23

## 2025-06-23 PROBLEM — J96.11 CHRONIC RESPIRATORY FAILURE WITH HYPOXIA, ON HOME O2 THERAPY: Status: RESOLVED | Noted: 2025-06-23 | Resolved: 2025-06-23

## 2025-06-23 PROBLEM — J96.11 CHRONIC RESPIRATORY FAILURE WITH HYPOXIA, ON HOME O2 THERAPY: Status: ACTIVE | Noted: 2025-06-23

## 2025-06-23 PROBLEM — Z51.5 HOSPICE CARE PATIENT: Status: RESOLVED | Noted: 2024-02-13 | Resolved: 2025-06-23

## 2025-06-23 RX ORDER — CIPROFLOXACIN 250 MG/1
250 TABLET, FILM COATED ORAL 2 TIMES DAILY
COMMUNITY
Start: 2025-06-17

## 2025-06-23 NOTE — PROGRESS NOTES
VIRTUAL/TELEMEDICINE VISIT   FAMILY MEDICINE  [facility]    The patient location is: [place]  The chief complaint leading to consultation is:   Chief Complaint   Patient presents with    Discuss Palliative Care     Visit type: Virtual visit with synchronous audio and video   Total time spent: 30 minute  Each patient to whom he or she provides medical services by telemedicine is:  (1) informed of the relationship between the physician and patient and the respective role of any other health care provider with respect to management of the patient; and (2) notified that he or she may decline to receive medical services by telemedicine and may withdraw from such care at any time.    Reason for visit:   Chief Complaint   Patient presents with    Discuss Palliative Care       SUBJECTIVE: Alessandra Hidalgo is a 89 y.o. female  - with hypertension, hyperlipidemia, history of pulmonary embolism (2016, 2024), emphysema, depression, anxiety on chronic benzodiazepine for sleep, CKD stage 3, pAfib, and dementia presents to discuss palliative care.  PCP: Quoc Castanon MD    Cardiologist: Dr. [facility]  Ortho Dr. More     She lives with her son (Zuhair) who assisted with the visit. The patient's last visit with me was on 12/13/2022.    Alessandra Hidalgo last hospitalization was 02/05/2024 after increasing shortness a breath and found to have recurrent pulmonary emboli.  She is on Eliquis at the time.  You are unsure she is consistent with taking her Eliquis since she does have dementia.  She was admitted for placement.  She is discharge to [facility]    Alessandra Hidalgo presents with her son who assists with the visit. She lives with her son and family assists with care. There is someone at the house at all time. Zuhair reports that she is completely bed-bound.  She will sit up to eat.  She has a Durham catheter in place.  He reports that she is unable to get up for bowel movements.   He does her dressing changes.  She reports overall her appetite seems good.  She has normal bowel movements.  She had diarrhea initially but it seems to have resolved.  She is no longer taking any medications including her anticoagulation.  She is only on ciprofloxacin 250 mg daily for UTI prevention.  He reports recently that she has been having more pain with the catheter.  She is also having leaking around the catheter.  He reports the hospice nurse will be coming by this week and likely will change it.    She has been on hospice since her discharge 02/2024.  He reports since then she has been bed-bound but overall doing better.  She is no longer on supplemental oxygen.  He reports hospice recommended palliative care.                Review of Systems   HENT:  Positive for hearing loss.    Eyes:  Negative for discharge.   Respiratory:  Negative for wheezing.    Cardiovascular:  Negative for chest pain and palpitations.   Gastrointestinal:  Positive for vomiting. Negative for blood in stool, constipation and diarrhea.   Genitourinary:  Positive for dysuria and hematuria.   Musculoskeletal:  Positive for neck pain.   Neurological:  Positive for weakness and headaches.   Endo/Heme/Allergies:  Negative for polydipsia.   All other systems reviewed and are negative.        HISTORY:   Past Medical History:   Diagnosis Date    Anxiety and depression     Benzodiazepine dependence     Chronic respiratory failure with hypoxia, on home O2 therapy 06/23/2025    Chronic rhinitis 05/04/2016    - well controlled  - continue current medication      CKD (chronic kidney disease) stage 3, GFR 30-59 ml/min     Baseline Cr 1-1.3    Compression fracture of T12 vertebra with delayed healing 02/01/2024    Essential hypertension 10/29/2013    - well controlled  - continue current medication      Generalized anxiety disorder 01/23/2020    - with benzodiazepine dependence   - continue Sertraline 50 mg daily      Hypercholesterolemia  10/29/2013    - well controlled  - continue current medication      Paroxysmal atrial fibrillation     OAC warfarin discontinued by Cardiology 7/13/21 due to increase fall risk.  No longer on oral anticoagulation    Recurrent major depressive disorder, in full remission 01/23/2020    - well controlled  - continue current medication      Vascular dementia without behavioral disturbance     Tried Aricept, but did not tolerate       Past Surgical History:   Procedure Laterality Date    APPENDECTOMY      CATARACT EXTRACTION W/ INTRAOCULAR LENS  IMPLANT, BILATERAL      HYSTERECTOMY      Partial     LEFT KNEE REPLACEMENT         Family History   Problem Relation Name Age of Onset    Cancer Mother      Stroke Daughter      Heart disease Neg Hx         Social History[1]    Social History     Social History Narrative    Lives with son Zuhair moved in 2019 to assist with care. 9/2021  passed away. They have 4 children. She is originally from [place]. She is sedentary. 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 DME: rollator walker. Tub bench       ALLERGIES:   Review of patient's allergies indicates:   Allergen Reactions    Atorvastatin     Prozac [fluoxetine]      dizziness       MEDS:   Current Outpatient Medications on File Prior to Visit   Medication Sig Dispense Refill Last Dose/Taking    ciprofloxacin HCl (CIPRO) 250 MG tablet Take 250 mg by mouth 2 (two) times daily.   Taking    acetaminophen (TYLENOL) 500 MG tablet Take 500 mg by mouth every 6 (six) hours as needed for Pain.       LIDOcaine (LIDODERM) 5 % Place 1 patch onto the skin once daily. Remove & Discard patch within 12 hours or as directed by MD (Patient taking differently: Place 1 patch onto the skin daily as needed. Remove & Discard patch within 12 hours or as directed by MD) 15 patch 0     [DISCONTINUED] ALPRAZolam (XANAX) 0.5 MG tablet TAKE 1 TABLET BY MOUTH AT BEDTIME AS NEEDED FOR INSOMNIA OR ANXIETY. 30 tablet  5     [DISCONTINUED] apixaban (ELIQUIS) 5 mg Tab Take 1 tablet (5 mg total) by mouth 2 (two) times daily. 180 tablet 3     [DISCONTINUED] furosemide (LASIX) 20 MG tablet Take 1 tablet (20 mg total) by mouth every other day. 15 tablet 2     [DISCONTINUED] memantine (NAMENDA) 5 MG Tab Take 1 tablet (5 mg total) by mouth once daily. 90 tablet 3     [DISCONTINUED] metoprolol succinate (TOPROL-XL) 25 MG 24 hr tablet Take 1 tablet (25 mg total) by mouth once daily. 90 tablet 3     [DISCONTINUED] rosuvastatin (CRESTOR) 10 MG tablet Take 1 tablet (10 mg total) by mouth once daily. 90 tablet 3     [DISCONTINUED] sertraline (ZOLOFT) 50 MG tablet Take 1 tablet (50 mg total) by mouth once daily. 90 tablet 3        Vital signs:   There were no vitals filed for this visit.  There is no height or weight on file to calculate BMI.    PHYSICAL EXAM:     Physical Exam  Constitutional:       General: She is not in acute distress.  Pulmonary:      Effort: Pulmonary effort is normal. No respiratory distress.   Neurological:      Mental Status: She is alert.   Psychiatric:         Speech: Speech normal.           PERTINENT RESULTS:   No visits with results within 1 Week(s) from this visit.   Latest known visit with results is:   No results displayed because visit has over 200 results.        Reading Physician Reading Date Result Priority   Jayy Garcia MD  [phone]  [phone] 2/1/2024 STAT     Narrative & Impression  EXAMINATION:  CTA CHEST NON CORONARY (PE STUDIES)     CLINICAL HISTORY:  Pulmonary embolism (PE) suspected, high prob;     TECHNIQUE:  Low dose axial images, sagittal and coronal reformations were obtained from the thoracic inlet to the lung bases following the IV administration of 75 mL of Omnipaque 350.  Contrast timing was optimized to evaluate the pulmonary arteries.  MIP images were performed.     COMPARISON:  Chest radiograph 02/01/2024, CTA chest 07/16/2023     FINDINGS:  The visualized soft tissue  structures at the base of the neck appear within normal limits allowing for streak artifact from dense contrast bolus in the right subclavian vein.     The thoracic aorta maintains normal caliber, contour, and course with moderate atherosclerotic calcification within its course.  There is no evidence of aneurysmal dilation.  There is enlargement of the right atrium and ventricle.  There is no significant pericardial effusion.  There are scattered coronary artery calcifications.  Esophagus maintains a normal course and caliber.  There is a moderate-sized hiatal hernia present.There is no bulky axillary lymph node enlargement.  There are several scattered small mediastinal lymph nodes, not significantly enlarged by CT criteria.     The trachea is patent..  Detailed evaluation of the lung parenchyma is limited by respiratory motion.  There is no pneumothorax.  There are ground-glass opacities identified throughout the lungs.  There are small layering bilateral pleural effusions with adjacent atelectasis or consolidation.  There is a small volume of partially loculated perifissural pleural fluid on the right side.     Evaluation of the pulmonary arteries is limited by respiratory motion.  There is mild heterogeneous opacification of the main pulmonary outflow tract as well as the right and left main pulmonary arteries presumably relating to contrast mixing.  There is an apparent occlusive filling defect identified within a segmental branch of the right upper lobe suspicious for acute pulmonary thromboembolism (for example axial series 2, is 145).  This appears new compared to prior examination.  There are web-like filling defects identified within multiple segmental and subsegmental branches of the bilateral lower lobes suggesting chronic thrombus.  Additionally, there is stable abnormal configuration of the right middle lobar artery which is markedly narrowed with diminished distal opacification which may relate to  sequelae of chronic pulmonary embolus.     Limited images of the upper abdomen obtained during the course of this dedicated thoracic CT demonstrate reflux of contrast into the hepatic veins suggesting elevated right-sided heart pressures.  There are multiple well-circumscribed hepatic hypodensities again demonstrated.  These appear similar to prior examination and may potentially represent hepatic cyst.  There is no free intraperitoneal air within the visualized upper abdomen.     The osseous structures demonstrate a recent superior endplate compression fracture of the T12 vertebral body.  There is mild interval increased height loss and sclerosis compared to prior lumbar spine examination of 01/14/2024.  There is a relatively stable appearing previously demonstrated T10 compression fracture.  There is a healed deformity of the sternum.  There are several remote appearing bilateral rib deformities.     There is mild body wall anasarca.     Impression:     In this patient with a history of previously demonstrated pulmonary thromboembolism, there is an apparent filling defect identified within a segmental branch of the right upper lobe.  This appears new compared to prior examination of 07/16/2023 and is concerning for acute/recent pulmonary thromboembolism.     Additionally, there are multiple web like filling defects identified throughout the bilateral lower lobes suggesting chronic pulmonary thromboembolism.  Similar appearing abnormal configuration of the right middle lobe pulmonary artery which demonstrates marked tapering and string like configuration with diminutive distal opacification which may relate to sequelae of chronic thromboembolism.     Right-sided heart enlargement as well as reflux of contrast into the hepatic veins suggesting elevated right-sided heart pressures.  This may relate to acute right-sided heart strain versus chronic right-sided cardiac dysfunction.  Clinical correlation with patient  history is advised as well as consideration for echocardiography for more definitive assessment.     Ground-glass opacities throughout the lungs as well as small layering bilateral pleural effusions.  This may relate to edema in the appropriate clinical setting.     Scattered coronary artery calcifications.     Recent compression fracture of the T12 vertebral body noting mild interval increased height loss and sclerosis compared to prior lumbar spine CT of 01/14/2024.  Relatively stable appearing chronic T10 compression fracture.     Additional findings as above.     This report was flagged in Epic as abnormal.        Electronically signed by:Jayy Garcia MD  Date:                                            02/01/2024  Time:                                           02:27    ASSESSMENT/PLAN:    1. Generalized weakness  Overview:  - no longer on supplemental oxygen   -bed-bound  -Durham catheter in place   -I do think she remains to be a hospice candidate however it looks like hospice needs to transitioned her to palliative care.  Referral placed to palliative care  - comfort care   -DNR      2. Vascular dementia without behavioral disturbance  Overview:  - comfort care   -good family support    Orders:  -     Ambulatory referral/consult to Palliative Care; Future; Expected date: 06/30/2025    3. Pulmonary hypertension with chronic cor pulmonale  Overview:  - 02/01/2024 echocardiogram:  EF 66%.  Normal diastolic function. The estimated pulmonary artery systolic pressure is 144 mmHg.   - 2/2 PE    Orders:  -     Ambulatory referral/consult to Palliative Care; Future; Expected date: 06/30/2025    4. History of pulmonary embolism  Overview:  - 2016, 2024  - comfort care no anticoagulation      5. Paroxysmal atrial fibrillation  Overview:  - no anticoagulation  -comfort care      6. Stage 3b chronic kidney disease  Overview:  - 11/15/21 Renal US:  Medical renal disease  - baseline creatinine 1.1-1.2  - baseline  creatinine 40-45  Lab Results   Component Value Date    CREATININE 0.72 02/11/2024           7. Durham catheter in place  Overview:  - recommend change monthly       8. Elevated LFTs  Overview:  Lab Results   Component Value Date    ALT 63 (H) 02/11/2024    AST 88 (H) 02/11/2024    ALKPHOS 118 02/11/2024    BILITOT 0.6 02/11/2024     - noted during her 02/2024 hospitalization  -do not recommend further labs   -comfort care              ORDERS:   Orders Placed This Encounter    Ambulatory referral/consult to Palliative Care         Follow up in about 3 months (around 9/23/2025). or sooner with any concerns      This note is dictated using the [employer] Fluency Direct word recognition program. There are word recognition mistakes that are occasionally missed on review.    Dr. Milli Rey D.O.   Family Medicine         [1]   Social History  Tobacco Use    Smoking status: Former     Current packs/day: 0.00     Types: Cigarettes     Passive exposure: Past    Smokeless tobacco: Former   Substance Use Topics    Alcohol use: Yes     Alcohol/week: 1.0 standard drink of alcohol     Types: 1 Glasses of wine per week    Drug use: No

## 2025-06-26 ENCOUNTER — PATIENT MESSAGE (OUTPATIENT)
Dept: PRIMARY CARE CLINIC | Facility: CLINIC | Age: 89
End: 2025-06-26
Payer: MEDICARE

## 2025-07-01 ENCOUNTER — PATIENT MESSAGE (OUTPATIENT)
Dept: PRIMARY CARE CLINIC | Facility: CLINIC | Age: 89
End: 2025-07-01
Payer: MEDICARE

## 2025-07-01 DIAGNOSIS — I27.29 PULMONARY HYPERTENSION WITH CHRONIC COR PULMONALE: ICD-10-CM

## 2025-07-01 DIAGNOSIS — F01.50 VASCULAR DEMENTIA WITHOUT BEHAVIORAL DISTURBANCE: Primary | Chronic | ICD-10-CM

## 2025-07-08 NOTE — TELEPHONE ENCOUNTER
Called spoke with son.  Called and spoke with hospice.  Spoke with director of nursing.  They just need a Epic order for palliative care as well as hospital bed.  Orders placed.    Orders Placed This Encounter    HOSPITAL BED FOR HOME USE    Ambulatory referral/consult to Palliative Care     Dr. Milli Rey D.O.   Family Medicine

## 2025-07-08 NOTE — TELEPHONE ENCOUNTER
I reached out to [facility] & spoke with Iris/Palliative Care Coordinator (Phone: [phone]) to follow up on referral. He advised that they have been trying to reach out to patient regarding setting up care, but cannot get anyone on the phone.     Patient's son, Zuhair's contact # provided. Advised that I will also pass along contact information & let son know that they have been trying to reach him.

## 2025-07-10 ENCOUNTER — TELEPHONE (OUTPATIENT)
Dept: PRIMARY CARE CLINIC | Facility: CLINIC | Age: 89
End: 2025-07-10
Payer: MEDICARE

## 2025-07-10 NOTE — TELEPHONE ENCOUNTER
Spoke with patient son Zuhair, he reports mother's health is declining and he would like e referral for hospice, states patient had an appointment for palliative care but it was cancelled due provider being unavailable    Son requesting portal reply from pcp, states he's having spotty cell phone service, virtual appointment scheduled with NP in am for referral

## 2025-07-10 NOTE — TELEPHONE ENCOUNTER
Please call [facility] [phone]     They had recommend transitioning Alessandra Hidalgo off of hospice to palliative and I placed the referral. But family today notes worsening status. No urination and black stools. They would like hospice to resume and contact them. Referral placed for Hospice.    Please ask if there is anything else they need from me    Dr. Milli Rey D.O.   Family Medicine

## 2025-07-10 NOTE — TELEPHONE ENCOUNTER
Spoke with Nayana at [facility] at [phone] asked to fax referral fax number [phone]

## 2025-07-10 NOTE — TELEPHONE ENCOUNTER
Please see my chart message    Please call [facility] [phone]      They had recommend transitioning Alessandra Hidalgo off of hospice to palliative and I placed the referral. But family today notes worsening status. No urination and black stools. They would like hospice to resume and contact them. Referral placed for Hospice.     Please ask if there is anything else they need from me     Dr. Milli Rey D.O.   Family Medicine

## 2025-07-17 ENCOUNTER — PATIENT MESSAGE (OUTPATIENT)
Dept: PRIMARY CARE CLINIC | Facility: CLINIC | Age: 89
End: 2025-07-17
Payer: MEDICARE

## 2025-07-21 ENCOUNTER — TELEPHONE (OUTPATIENT)
Dept: PALLIATIVE MEDICINE | Facility: CLINIC | Age: 89
End: 2025-07-21
Payer: MEDICARE